# Patient Record
Sex: FEMALE | Race: WHITE
[De-identification: names, ages, dates, MRNs, and addresses within clinical notes are randomized per-mention and may not be internally consistent; named-entity substitution may affect disease eponyms.]

---

## 2017-02-17 ENCOUNTER — APPOINTMENT (OUTPATIENT)
Dept: RHEUMATOLOGY | Facility: CLINIC | Age: 62
End: 2017-02-17

## 2017-02-17 VITALS
HEIGHT: 63 IN | HEART RATE: 61 BPM | DIASTOLIC BLOOD PRESSURE: 98 MMHG | SYSTOLIC BLOOD PRESSURE: 138 MMHG | OXYGEN SATURATION: 98 %

## 2017-02-17 VITALS — WEIGHT: 254 LBS | BODY MASS INDEX: 44.99 KG/M2

## 2017-02-17 DIAGNOSIS — Z86.19 PERSONAL HISTORY OF OTHER INFECTIOUS AND PARASITIC DISEASES: ICD-10-CM

## 2017-02-17 DIAGNOSIS — F17.210 NICOTINE DEPENDENCE, CIGARETTES, UNCOMPLICATED: ICD-10-CM

## 2017-02-17 DIAGNOSIS — I10 ESSENTIAL (PRIMARY) HYPERTENSION: ICD-10-CM

## 2017-02-17 DIAGNOSIS — Z86.59 PERSONAL HISTORY OF OTHER MENTAL AND BEHAVIORAL DISORDERS: ICD-10-CM

## 2017-02-17 DIAGNOSIS — Z86.39 PERSONAL HISTORY OF OTHER ENDOCRINE, NUTRITIONAL AND METABOLIC DISEASE: ICD-10-CM

## 2017-02-17 RX ORDER — ATORVASTATIN CALCIUM 10 MG/1
10 TABLET, FILM COATED ORAL
Refills: 0 | Status: ACTIVE | COMMUNITY

## 2017-02-17 RX ORDER — METHYLPRED ACET/NACL,ISO-OS/PF 80 MG/ML
80 VIAL (ML) INJECTION
Qty: 1 | Refills: 0 | Status: COMPLETED | OUTPATIENT
Start: 2017-02-17

## 2017-02-17 RX ORDER — ETODOLAC 400 MG/1
400 TABLET, FILM COATED ORAL TWICE DAILY
Qty: 60 | Refills: 0 | Status: ACTIVE | COMMUNITY
Start: 2017-02-17 | End: 1900-01-01

## 2017-02-17 RX ORDER — TRAZODONE HYDROCHLORIDE 100 MG/1
100 TABLET ORAL
Refills: 0 | Status: ACTIVE | COMMUNITY

## 2017-02-17 RX ORDER — SERTRALINE HYDROCHLORIDE 50 MG/1
50 TABLET, FILM COATED ORAL
Refills: 0 | Status: ACTIVE | COMMUNITY

## 2017-02-17 RX ORDER — METOPROLOL TARTRATE 75 MG/1
TABLET, FILM COATED ORAL
Refills: 0 | Status: ACTIVE | COMMUNITY

## 2017-02-17 RX ORDER — ENALAPRIL MALEATE 5 MG/1
TABLET ORAL
Refills: 0 | Status: ACTIVE | COMMUNITY

## 2017-02-17 RX ORDER — METFORMIN HYDROCHLORIDE 500 MG/1
500 TABLET, COATED ORAL
Refills: 0 | Status: ACTIVE | COMMUNITY

## 2017-02-17 RX ORDER — GLIMEPIRIDE 1 MG/1
1 TABLET ORAL
Refills: 0 | Status: ACTIVE | COMMUNITY

## 2017-02-17 RX ADMIN — METHYLPREDNISOLONE ACETATE MG/ML: 80 INJECTION, SUSPENSION INTRA-ARTICULAR; INTRALESIONAL; INTRAMUSCULAR; SOFT TISSUE at 00:00

## 2017-03-03 ENCOUNTER — LABORATORY RESULT (OUTPATIENT)
Age: 62
End: 2017-03-03

## 2017-03-04 LAB
ALBUMIN SERPL ELPH-MCNC: 4 G/DL
ALP BLD-CCNC: 63 U/L
ALT SERPL-CCNC: 12 U/L
ANION GAP SERPL CALC-SCNC: 17 MMOL/L
AST SERPL-CCNC: 15 U/L
BILIRUB SERPL-MCNC: 0.2 MG/DL
BUN SERPL-MCNC: 13 MG/DL
CALCIUM SERPL-MCNC: 10.1 MG/DL
CHLORIDE SERPL-SCNC: 98 MMOL/L
CO2 SERPL-SCNC: 25 MMOL/L
CREAT SERPL-MCNC: 0.82 MG/DL
ENA SS-A AB SER IA-ACNC: <0.2 AL
ENA SS-B AB SER IA-ACNC: <0.2 AL
GLUCOSE SERPL-MCNC: 202 MG/DL
HAV IGM SER QL: NONREACTIVE
HBV CORE IGM SER QL: NONREACTIVE
HBV SURFACE AG SER QL: NONREACTIVE
HCV AB SER QL: REACTIVE
HCV S/CO RATIO: 12.72 S/CO
POTASSIUM SERPL-SCNC: 4.4 MMOL/L
PROT SERPL-MCNC: 7.8 G/DL
RHEUMATOID FACT SER QL: 19.4 IU/ML
SODIUM SERPL-SCNC: 140 MMOL/L
URATE SERPL-MCNC: 5.9 MG/DL

## 2017-03-05 LAB
B BURGDOR AB SER-IMP: NEGATIVE
B BURGDOR IGM PATRN SER IB-IMP: NEGATIVE
B BURGDOR18/20KD IGM SER QL IB: NORMAL
B BURGDOR18KD IGG SER QL IB: NORMAL
B BURGDOR23KD IGG SER QL IB: NORMAL
B BURGDOR23KD IGM SER QL IB: NORMAL
B BURGDOR28KD AB SER QL IB: NORMAL
B BURGDOR28KD IGG SER QL IB: NORMAL
B BURGDOR30KD AB SER QL IB: NORMAL
B BURGDOR30KD IGG SER QL IB: NORMAL
B BURGDOR31KD IGG SER QL IB: NORMAL
B BURGDOR31KD IGM SER QL IB: NORMAL
B BURGDOR39KD IGG SER QL IB: NORMAL
B BURGDOR39KD IGM SER QL IB: NORMAL
B BURGDOR41KD IGG SER QL IB: NORMAL
B BURGDOR41KD IGM SER QL IB: NORMAL
B BURGDOR45KD AB SER QL IB: NORMAL
B BURGDOR45KD IGG SER QL IB: NORMAL
B BURGDOR58KD AB SER QL IB: NORMAL
B BURGDOR58KD IGG SER QL IB: NORMAL
B BURGDOR66KD IGG SER QL IB: NORMAL
B BURGDOR66KD IGM SER QL IB: NORMAL
B BURGDOR93KD IGG SER QL IB: NORMAL
B BURGDOR93KD IGM SER QL IB: NORMAL
CCP AB SER IA-ACNC: <8 UNITS
DSDNA AB SER-ACNC: <12 IU/ML
RF+CCP IGG SER-IMP: NEGATIVE

## 2017-03-06 LAB
ANA SER IF-ACNC: NEGATIVE
CRYOGLOB SERPL-MCNC: NEGATIVE

## 2017-03-07 LAB
B19V IGG SER QL IA: 0.8 INDEX
B19V IGG+IGM SER-IMP: NEGATIVE
B19V IGG+IGM SER-IMP: NORMAL
B19V IGM FLD-ACNC: 0.1 INDEX
B19V IGM SER-ACNC: NEGATIVE

## 2017-03-10 ENCOUNTER — APPOINTMENT (OUTPATIENT)
Dept: RHEUMATOLOGY | Facility: CLINIC | Age: 62
End: 2017-03-10

## 2017-03-10 VITALS
HEIGHT: 63 IN | SYSTOLIC BLOOD PRESSURE: 132 MMHG | BODY MASS INDEX: 45 KG/M2 | DIASTOLIC BLOOD PRESSURE: 76 MMHG | WEIGHT: 254 LBS

## 2017-03-10 DIAGNOSIS — M79.641 PAIN IN RIGHT HAND: ICD-10-CM

## 2017-03-10 DIAGNOSIS — R76.8 OTHER SPECIFIED ABNORMAL IMMUNOLOGICAL FINDINGS IN SERUM: ICD-10-CM

## 2017-03-10 DIAGNOSIS — M79.642 PAIN IN RIGHT HAND: ICD-10-CM

## 2017-05-12 ENCOUNTER — APPOINTMENT (OUTPATIENT)
Dept: RHEUMATOLOGY | Facility: CLINIC | Age: 62
End: 2017-05-12

## 2017-05-12 VITALS
SYSTOLIC BLOOD PRESSURE: 140 MMHG | HEIGHT: 63 IN | WEIGHT: 254 LBS | BODY MASS INDEX: 45 KG/M2 | DIASTOLIC BLOOD PRESSURE: 90 MMHG

## 2017-05-12 RX ORDER — METHYLPRED ACET/NACL,ISO-OS/PF 80 MG/ML
80 VIAL (ML) INJECTION
Qty: 1 | Refills: 0 | Status: COMPLETED | OUTPATIENT
Start: 2017-05-12

## 2017-05-12 RX ADMIN — METHYLPREDNISOLONE ACETATE MG/ML: 80 INJECTION, SUSPENSION INTRA-ARTICULAR; INTRALESIONAL; INTRAMUSCULAR; SOFT TISSUE at 00:00

## 2017-05-15 ENCOUNTER — RX RENEWAL (OUTPATIENT)
Age: 62
End: 2017-05-15

## 2017-05-25 RX ORDER — HYALURONATE SODIUM 10 MG/ML
25 SYRINGE (ML) INTRAARTICULAR
Qty: 3 | Refills: 0 | Status: ACTIVE | COMMUNITY
Start: 2017-05-12

## 2017-08-31 ENCOUNTER — RX RENEWAL (OUTPATIENT)
Age: 62
End: 2017-08-31

## 2017-08-31 RX ORDER — IBUPROFEN 600 MG/1
600 TABLET, FILM COATED ORAL TWICE DAILY
Qty: 60 | Refills: 2 | Status: ACTIVE | COMMUNITY
Start: 2017-05-12 | End: 1900-01-01

## 2017-11-03 ENCOUNTER — EMERGENCY (EMERGENCY)
Facility: HOSPITAL | Age: 62
LOS: 0 days | Discharge: HOME | End: 2017-11-03
Attending: EMERGENCY MEDICINE | Admitting: EMERGENCY MEDICINE
Payer: MEDICAID

## 2017-11-03 VITALS
RESPIRATION RATE: 18 BRPM | OXYGEN SATURATION: 100 % | HEIGHT: 63 IN | HEART RATE: 68 BPM | WEIGHT: 240.08 LBS | TEMPERATURE: 98 F | SYSTOLIC BLOOD PRESSURE: 142 MMHG | DIASTOLIC BLOOD PRESSURE: 80 MMHG

## 2017-11-03 DIAGNOSIS — N13.30 UNSPECIFIED HYDRONEPHROSIS: ICD-10-CM

## 2017-11-03 DIAGNOSIS — N39.0 URINARY TRACT INFECTION, SITE NOT SPECIFIED: ICD-10-CM

## 2017-11-03 LAB
ALBUMIN SERPL ELPH-MCNC: 3.9 G/DL — SIGNIFICANT CHANGE UP (ref 3.3–5)
ALP SERPL-CCNC: 69 U/L — SIGNIFICANT CHANGE UP (ref 40–120)
ALT FLD-CCNC: 21 U/L — SIGNIFICANT CHANGE UP (ref 12–78)
ANION GAP SERPL CALC-SCNC: 7 MMOL/L — SIGNIFICANT CHANGE UP (ref 5–17)
APPEARANCE UR: (no result)
APTT BLD: 30.7 SEC — SIGNIFICANT CHANGE UP (ref 27.5–37.4)
AST SERPL-CCNC: 18 U/L — SIGNIFICANT CHANGE UP (ref 15–37)
BACTERIA # UR AUTO: (no result)
BASOPHILS # BLD AUTO: 0.1 K/UL — SIGNIFICANT CHANGE UP (ref 0–0.2)
BASOPHILS NFR BLD AUTO: 0.8 % — SIGNIFICANT CHANGE UP (ref 0–2)
BILIRUB SERPL-MCNC: 0.3 MG/DL — SIGNIFICANT CHANGE UP (ref 0.2–1.2)
BILIRUB UR-MCNC: (no result)
BUN SERPL-MCNC: 16 MG/DL — SIGNIFICANT CHANGE UP (ref 7–23)
CALCIUM SERPL-MCNC: 9.7 MG/DL — SIGNIFICANT CHANGE UP (ref 8.5–10.1)
CHLORIDE SERPL-SCNC: 104 MMOL/L — SIGNIFICANT CHANGE UP (ref 96–108)
CO2 SERPL-SCNC: 29 MMOL/L — SIGNIFICANT CHANGE UP (ref 22–31)
COLOR SPEC: (no result)
CREAT SERPL-MCNC: 1.11 MG/DL — SIGNIFICANT CHANGE UP (ref 0.5–1.3)
DIFF PNL FLD: (no result)
EOSINOPHIL # BLD AUTO: 0.2 K/UL — SIGNIFICANT CHANGE UP (ref 0–0.5)
EOSINOPHIL NFR BLD AUTO: 1.2 % — SIGNIFICANT CHANGE UP (ref 0–6)
EPI CELLS # UR: SIGNIFICANT CHANGE UP
GLUCOSE SERPL-MCNC: 194 MG/DL — HIGH (ref 70–99)
GLUCOSE UR QL: NEGATIVE MG/DL — SIGNIFICANT CHANGE UP
HCT VFR BLD CALC: 42.6 % — SIGNIFICANT CHANGE UP (ref 34.5–45)
HGB BLD-MCNC: 13.8 G/DL — SIGNIFICANT CHANGE UP (ref 11.5–15.5)
INR BLD: 0.93 RATIO — SIGNIFICANT CHANGE UP (ref 0.88–1.16)
KETONES UR-MCNC: NEGATIVE — SIGNIFICANT CHANGE UP
LEUKOCYTE ESTERASE UR-ACNC: NEGATIVE — SIGNIFICANT CHANGE UP
LIDOCAIN IGE QN: 187 U/L — SIGNIFICANT CHANGE UP (ref 73–393)
LYMPHOCYTES # BLD AUTO: 15.9 % — SIGNIFICANT CHANGE UP (ref 13–44)
LYMPHOCYTES # BLD AUTO: 2.4 K/UL — SIGNIFICANT CHANGE UP (ref 1–3.3)
MCHC RBC-ENTMCNC: 28 PG — SIGNIFICANT CHANGE UP (ref 27–34)
MCHC RBC-ENTMCNC: 32.4 GM/DL — SIGNIFICANT CHANGE UP (ref 32–36)
MCV RBC AUTO: 86.6 FL — SIGNIFICANT CHANGE UP (ref 80–100)
MONOCYTES # BLD AUTO: 0.6 K/UL — SIGNIFICANT CHANGE UP (ref 0–0.9)
MONOCYTES NFR BLD AUTO: 3.9 % — SIGNIFICANT CHANGE UP (ref 2–14)
NEUTROPHILS # BLD AUTO: 11.8 K/UL — HIGH (ref 1.8–7.4)
NEUTROPHILS NFR BLD AUTO: 78.2 % — HIGH (ref 43–77)
NITRITE UR-MCNC: POSITIVE
PH UR: 5 — SIGNIFICANT CHANGE UP (ref 5–8)
PLATELET # BLD AUTO: 245 K/UL — SIGNIFICANT CHANGE UP (ref 150–400)
POTASSIUM SERPL-MCNC: 4.5 MMOL/L — SIGNIFICANT CHANGE UP (ref 3.5–5.3)
POTASSIUM SERPL-SCNC: 4.5 MMOL/L — SIGNIFICANT CHANGE UP (ref 3.5–5.3)
PROT SERPL-MCNC: 8.5 GM/DL — HIGH (ref 6–8.3)
PROT UR-MCNC: 100 MG/DL
PROTHROM AB SERPL-ACNC: 10 SEC — SIGNIFICANT CHANGE UP (ref 9.8–12.7)
RBC # BLD: 4.92 M/UL — SIGNIFICANT CHANGE UP (ref 3.8–5.2)
RBC # FLD: 14.4 % — SIGNIFICANT CHANGE UP (ref 10.3–14.5)
RBC CASTS # UR COMP ASSIST: >50 /HPF (ref 0–4)
SODIUM SERPL-SCNC: 140 MMOL/L — SIGNIFICANT CHANGE UP (ref 135–145)
SP GR SPEC: 1.02 — SIGNIFICANT CHANGE UP (ref 1.01–1.02)
UROBILINOGEN FLD QL: 8 MG/DL
WBC # BLD: 15 K/UL — HIGH (ref 3.8–10.5)
WBC # FLD AUTO: 15 K/UL — HIGH (ref 3.8–10.5)
WBC UR QL: SIGNIFICANT CHANGE UP

## 2017-11-03 PROCEDURE — 99285 EMERGENCY DEPT VISIT HI MDM: CPT

## 2017-11-03 PROCEDURE — 74176 CT ABD & PELVIS W/O CONTRAST: CPT | Mod: 26

## 2017-11-03 RX ORDER — CEFTRIAXONE 500 MG/1
1 INJECTION, POWDER, FOR SOLUTION INTRAMUSCULAR; INTRAVENOUS EVERY 24 HOURS
Qty: 0 | Refills: 0 | Status: DISCONTINUED | OUTPATIENT
Start: 2017-11-03 | End: 2017-11-03

## 2017-11-03 RX ORDER — SODIUM CHLORIDE 9 MG/ML
1000 INJECTION INTRAMUSCULAR; INTRAVENOUS; SUBCUTANEOUS ONCE
Qty: 0 | Refills: 0 | Status: COMPLETED | OUTPATIENT
Start: 2017-11-03 | End: 2017-11-03

## 2017-11-03 RX ORDER — HYDROMORPHONE HYDROCHLORIDE 2 MG/ML
1 INJECTION INTRAMUSCULAR; INTRAVENOUS; SUBCUTANEOUS ONCE
Qty: 0 | Refills: 0 | Status: DISCONTINUED | OUTPATIENT
Start: 2017-11-03 | End: 2017-11-03

## 2017-11-03 RX ORDER — ONDANSETRON 8 MG/1
4 TABLET, FILM COATED ORAL ONCE
Qty: 0 | Refills: 0 | Status: COMPLETED | OUTPATIENT
Start: 2017-11-03 | End: 2017-11-03

## 2017-11-03 RX ORDER — CEPHALEXIN 500 MG
1 CAPSULE ORAL
Qty: 21 | Refills: 0
Start: 2017-11-03 | End: 2017-11-10

## 2017-11-03 RX ADMIN — HYDROMORPHONE HYDROCHLORIDE 1 MILLIGRAM(S): 2 INJECTION INTRAMUSCULAR; INTRAVENOUS; SUBCUTANEOUS at 19:10

## 2017-11-03 RX ADMIN — SODIUM CHLORIDE 1000 MILLILITER(S): 9 INJECTION INTRAMUSCULAR; INTRAVENOUS; SUBCUTANEOUS at 18:15

## 2017-11-03 RX ADMIN — CEFTRIAXONE 100 GRAM(S): 500 INJECTION, POWDER, FOR SOLUTION INTRAMUSCULAR; INTRAVENOUS at 18:58

## 2017-11-03 RX ADMIN — ONDANSETRON 4 MILLIGRAM(S): 8 TABLET, FILM COATED ORAL at 18:17

## 2017-11-03 RX ADMIN — HYDROMORPHONE HYDROCHLORIDE 1 MILLIGRAM(S): 2 INJECTION INTRAMUSCULAR; INTRAVENOUS; SUBCUTANEOUS at 18:17

## 2017-11-03 NOTE — ED ADULT NURSE NOTE - OBJECTIVE STATEMENT
Pt alert and oriented x3. Pt presents with Lt sided flank pain and pain upon urination. Pt states she has had a kidney stone in the past.

## 2017-11-03 NOTE — ED STATDOCS - PROGRESS NOTE DETAILS
PAPA Rivera:   Patient has been seen, evaluated and orders have been written by the attending in intake. Patient is stable.  I will follow up the results of orders written and I will continue to evaluate/observe the patient.  Lee Ann Rivera PA-C Reevaluated patient at bedside.  Patient feeling much improved.  Discussed the results of all diagnostic testing in ED and copies of all reports given.   An opportunity to ask questions was given.  Discussed the importance of prompt, close medical follow-up.  Patient will return with any changes, concerns or persistent / worsening symptoms.  Understanding of all instructions verbalized.  Lee Ann Rivera PA-C

## 2017-11-03 NOTE — ED STATDOCS - OBJECTIVE STATEMENT
61 y/o F w/ pmhx of renal calculi about 1 year ago. presents to ED c/o left flank pain with n/v. Pt c/o painful and burning urination. PMD dr. Vega. Pt has not seen a urologist. Also c/o abd pain and constipation. Describes pain as a sharp pain with sudden onset with associated sweats. Also states she has abd pain and is constipated. No other acute complaints.

## 2017-11-03 NOTE — ED STATDOCS - ATTENDING CONTRIBUTION TO CARE
I, Rafa Maria MD,  performed the initial face to face bedside interview with this patient regarding history of present illness, review of symptoms and relevant past medical, social and family history.  I completed an independent physical examination.  I was the initial provider who evaluated this patient. I have signed out the follow up of any pending tests (i.e. labs, radiological studies) to the ACP.  I have communicated the patient’s plan of care and disposition with the ACP.  The history, relevant review of systems, past medical and surgical history, medical decision making, and physical examination was documented by the scribe in my presence and I attest to the accuracy of the documentation.

## 2017-11-03 NOTE — ED STATDOCS - CARE PLAN
Principal Discharge DX:	Renal colic on left side  Secondary Diagnosis:	UTI (urinary tract infection)

## 2017-11-03 NOTE — ED STATDOCS - NS_ ATTENDINGSCRIBEDETAILS _ED_A_ED_FT
I, Rafa Maria MD,  performed the initial face to face bedside interview with this patient regarding history of present illness, review of symptoms and relevant past medical, social and family history.  I completed an independent physical examination.    The history, relevant review of systems, past medical and surgical history, medical decision making, and physical examination was documented by the scribe in my presence and I attest to the accuracy of the documentation.

## 2017-11-19 ENCOUNTER — LABORATORY RESULT (OUTPATIENT)
Age: 62
End: 2017-11-19

## 2017-11-20 ENCOUNTER — APPOINTMENT (OUTPATIENT)
Dept: RHEUMATOLOGY | Facility: CLINIC | Age: 62
End: 2017-11-20
Payer: MEDICAID

## 2017-11-20 VITALS
BODY MASS INDEX: 42.52 KG/M2 | HEIGHT: 63 IN | DIASTOLIC BLOOD PRESSURE: 82 MMHG | WEIGHT: 240 LBS | HEART RATE: 74 BPM | SYSTOLIC BLOOD PRESSURE: 118 MMHG | OXYGEN SATURATION: 97 %

## 2017-11-20 DIAGNOSIS — R70.0 ELEVATED ERYTHROCYTE SEDIMENTATION RATE: ICD-10-CM

## 2017-11-20 DIAGNOSIS — M15.9 POLYOSTEOARTHRITIS, UNSPECIFIED: ICD-10-CM

## 2017-11-20 DIAGNOSIS — Z79.1 LONG TERM (CURRENT) USE OF NON-STEROIDAL ANTI-INFLAMMATORIES (NSAID): ICD-10-CM

## 2017-11-20 PROCEDURE — 20610 DRAIN/INJ JOINT/BURSA W/O US: CPT

## 2017-11-20 PROCEDURE — 99213 OFFICE O/P EST LOW 20 MIN: CPT | Mod: 25

## 2017-11-20 RX ORDER — NITROGLYCERIN 0.4 MG/1
0.4 TABLET SUBLINGUAL
Qty: 100 | Refills: 0 | Status: ACTIVE | COMMUNITY
Start: 2017-06-12

## 2017-11-20 RX ORDER — METHYLPRED ACET/NACL,ISO-OS/PF 80 MG/ML
80 VIAL (ML) INJECTION
Qty: 1 | Refills: 0 | Status: COMPLETED | OUTPATIENT
Start: 2017-11-20

## 2017-11-20 RX ORDER — SERTRALINE HYDROCHLORIDE 100 MG/1
100 TABLET, FILM COATED ORAL
Qty: 60 | Refills: 0 | Status: ACTIVE | COMMUNITY
Start: 2017-06-12

## 2017-11-20 RX ORDER — METFORMIN HYDROCHLORIDE 1000 MG/1
1000 TABLET, COATED ORAL
Qty: 30 | Refills: 0 | Status: ACTIVE | COMMUNITY
Start: 2017-05-04

## 2017-11-20 RX ORDER — METOPROLOL SUCCINATE 100 MG/1
100 TABLET, EXTENDED RELEASE ORAL
Qty: 30 | Refills: 0 | Status: ACTIVE | COMMUNITY
Start: 2017-03-10

## 2017-11-20 RX ORDER — ZOLPIDEM TARTRATE 10 MG/1
10 TABLET ORAL
Qty: 30 | Refills: 0 | Status: ACTIVE | COMMUNITY
Start: 2017-06-12

## 2017-11-20 RX ORDER — OXYCODONE AND ACETAMINOPHEN 5; 325 MG/1; MG/1
5-325 TABLET ORAL
Qty: 4 | Refills: 0 | Status: ACTIVE | COMMUNITY
Start: 2017-11-03

## 2017-11-20 RX ORDER — OMEPRAZOLE 20 MG/1
20 CAPSULE, DELAYED RELEASE ORAL
Qty: 30 | Refills: 0 | Status: ACTIVE | COMMUNITY
Start: 2016-12-27

## 2017-11-20 RX ORDER — ALBUTEROL SULFATE 90 UG/1
108 (90 BASE) AEROSOL, METERED RESPIRATORY (INHALATION)
Qty: 18 | Refills: 0 | Status: ACTIVE | COMMUNITY
Start: 2017-05-30

## 2017-11-20 RX ORDER — GLIMEPIRIDE 2 MG/1
2 TABLET ORAL
Qty: 30 | Refills: 0 | Status: ACTIVE | COMMUNITY
Start: 2017-06-12

## 2017-11-20 RX ADMIN — METHYLPREDNISOLONE ACETATE MG/ML: 80 INJECTION, SUSPENSION INTRA-ARTICULAR; INTRALESIONAL; INTRAMUSCULAR; SOFT TISSUE at 00:00

## 2017-12-14 ENCOUNTER — MOBILE ON CALL (OUTPATIENT)
Age: 62
End: 2017-12-14

## 2017-12-15 ENCOUNTER — OTHER (OUTPATIENT)
Age: 62
End: 2017-12-15

## 2018-01-26 ENCOUNTER — APPOINTMENT (OUTPATIENT)
Dept: RHEUMATOLOGY | Facility: CLINIC | Age: 63
End: 2018-01-26
Payer: MEDICAID

## 2018-01-26 VITALS
BODY MASS INDEX: 44.3 KG/M2 | SYSTOLIC BLOOD PRESSURE: 122 MMHG | WEIGHT: 250 LBS | HEIGHT: 63 IN | OXYGEN SATURATION: 97 % | HEART RATE: 70 BPM | DIASTOLIC BLOOD PRESSURE: 76 MMHG

## 2018-01-26 PROCEDURE — J3490D: CUSTOM

## 2018-01-26 PROCEDURE — 20610 DRAIN/INJ JOINT/BURSA W/O US: CPT | Mod: LT

## 2018-01-26 RX ADMIN — TRIAMCINOLONE ACETONIDE EXTENDED-RELEASE INJECTABLE SUSPENSION 0 MG: KIT INTRA-ARTICULAR at 00:00

## 2018-04-03 NOTE — ED ADULT NURSE NOTE - CAS DISCH ACCOMP BY
1) LEFT FINGER FOREIGN BODY   START ANTIBIOTIC   WARM EPSOM SALT SOAKS TWICE DAILY   TYLENOL OR MOTRIN FOR PAIN     CALL THE ORTHO TOMORROW   
Self/Caregiver

## 2018-04-13 ENCOUNTER — APPOINTMENT (OUTPATIENT)
Dept: RHEUMATOLOGY | Facility: CLINIC | Age: 63
End: 2018-04-13

## 2018-11-13 PROBLEM — N20.0 CALCULUS OF KIDNEY: Chronic | Status: ACTIVE | Noted: 2017-11-04

## 2018-11-16 ENCOUNTER — APPOINTMENT (OUTPATIENT)
Dept: RHEUMATOLOGY | Facility: CLINIC | Age: 63
End: 2018-11-16
Payer: MEDICAID

## 2018-11-16 VITALS
HEIGHT: 63 IN | HEART RATE: 66 BPM | WEIGHT: 240 LBS | BODY MASS INDEX: 42.52 KG/M2 | SYSTOLIC BLOOD PRESSURE: 127 MMHG | DIASTOLIC BLOOD PRESSURE: 84 MMHG | OXYGEN SATURATION: 96 %

## 2018-11-16 PROCEDURE — J3490D: CUSTOM

## 2018-11-16 PROCEDURE — 20610 DRAIN/INJ JOINT/BURSA W/O US: CPT | Mod: LT

## 2018-11-16 RX ADMIN — TRIAMCINOLONE ACETONIDE EXTENDED-RELEASE INJECTABLE SUSPENSION 0 MG: KIT INTRA-ARTICULAR at 00:00

## 2018-12-01 ENCOUNTER — EMERGENCY (EMERGENCY)
Facility: HOSPITAL | Age: 63
LOS: 0 days | Discharge: HOME | End: 2018-12-01
Attending: EMERGENCY MEDICINE | Admitting: EMERGENCY MEDICINE
Payer: MEDICAID

## 2018-12-01 VITALS
DIASTOLIC BLOOD PRESSURE: 88 MMHG | RESPIRATION RATE: 18 BRPM | HEIGHT: 63 IN | SYSTOLIC BLOOD PRESSURE: 164 MMHG | TEMPERATURE: 100 F | HEART RATE: 80 BPM | WEIGHT: 293 LBS | OXYGEN SATURATION: 94 %

## 2018-12-01 DIAGNOSIS — Z91.018 ALLERGY TO OTHER FOODS: ICD-10-CM

## 2018-12-01 DIAGNOSIS — N39.0 URINARY TRACT INFECTION, SITE NOT SPECIFIED: ICD-10-CM

## 2018-12-01 DIAGNOSIS — R10.9 UNSPECIFIED ABDOMINAL PAIN: ICD-10-CM

## 2018-12-01 DIAGNOSIS — Z87.442 PERSONAL HISTORY OF URINARY CALCULI: ICD-10-CM

## 2018-12-01 LAB
ABO RH CONFIRMATION: SIGNIFICANT CHANGE UP
ALBUMIN SERPL ELPH-MCNC: 3.3 G/DL — SIGNIFICANT CHANGE UP (ref 3.3–5)
ALP SERPL-CCNC: 69 U/L — SIGNIFICANT CHANGE UP (ref 40–120)
ALT FLD-CCNC: 17 U/L — SIGNIFICANT CHANGE UP (ref 12–78)
ANION GAP SERPL CALC-SCNC: 7 MMOL/L — SIGNIFICANT CHANGE UP (ref 5–17)
APPEARANCE UR: CLEAR — SIGNIFICANT CHANGE UP
APTT BLD: 31.8 SEC — SIGNIFICANT CHANGE UP (ref 27.5–36.3)
AST SERPL-CCNC: 13 U/L — LOW (ref 15–37)
BACTERIA # UR AUTO: ABNORMAL
BASOPHILS # BLD AUTO: 0.04 K/UL — SIGNIFICANT CHANGE UP (ref 0–0.2)
BASOPHILS NFR BLD AUTO: 0.3 % — SIGNIFICANT CHANGE UP (ref 0–2)
BILIRUB SERPL-MCNC: 0.2 MG/DL — SIGNIFICANT CHANGE UP (ref 0.2–1.2)
BILIRUB UR-MCNC: NEGATIVE — SIGNIFICANT CHANGE UP
BLD GP AB SCN SERPL QL: SIGNIFICANT CHANGE UP
BUN SERPL-MCNC: 19 MG/DL — SIGNIFICANT CHANGE UP (ref 7–23)
CALCIUM SERPL-MCNC: 9.5 MG/DL — SIGNIFICANT CHANGE UP (ref 8.5–10.1)
CHLORIDE SERPL-SCNC: 102 MMOL/L — SIGNIFICANT CHANGE UP (ref 96–108)
CO2 SERPL-SCNC: 27 MMOL/L — SIGNIFICANT CHANGE UP (ref 22–31)
COLOR SPEC: YELLOW — SIGNIFICANT CHANGE UP
COMMENT - URINE: SIGNIFICANT CHANGE UP
CREAT SERPL-MCNC: 1.02 MG/DL — SIGNIFICANT CHANGE UP (ref 0.5–1.3)
DIFF PNL FLD: ABNORMAL
EOSINOPHIL # BLD AUTO: 0.12 K/UL — SIGNIFICANT CHANGE UP (ref 0–0.5)
EOSINOPHIL NFR BLD AUTO: 0.8 % — SIGNIFICANT CHANGE UP (ref 0–6)
EPI CELLS # UR: ABNORMAL
GLUCOSE SERPL-MCNC: 199 MG/DL — HIGH (ref 70–99)
GLUCOSE UR QL: NEGATIVE MG/DL — SIGNIFICANT CHANGE UP
HCT VFR BLD CALC: 40.9 % — SIGNIFICANT CHANGE UP (ref 34.5–45)
HGB BLD-MCNC: 13.2 G/DL — SIGNIFICANT CHANGE UP (ref 11.5–15.5)
IMM GRANULOCYTES NFR BLD AUTO: 0.3 % — SIGNIFICANT CHANGE UP (ref 0–1.5)
INR BLD: 1 RATIO — SIGNIFICANT CHANGE UP (ref 0.88–1.16)
KETONES UR-MCNC: NEGATIVE — SIGNIFICANT CHANGE UP
LEUKOCYTE ESTERASE UR-ACNC: ABNORMAL
LIDOCAIN IGE QN: 181 U/L — SIGNIFICANT CHANGE UP (ref 73–393)
LYMPHOCYTES # BLD AUTO: 19.7 % — SIGNIFICANT CHANGE UP (ref 13–44)
LYMPHOCYTES # BLD AUTO: 2.78 K/UL — SIGNIFICANT CHANGE UP (ref 1–3.3)
MAGNESIUM SERPL-MCNC: 1.7 MG/DL — SIGNIFICANT CHANGE UP (ref 1.6–2.6)
MCHC RBC-ENTMCNC: 28.1 PG — SIGNIFICANT CHANGE UP (ref 27–34)
MCHC RBC-ENTMCNC: 32.3 GM/DL — SIGNIFICANT CHANGE UP (ref 32–36)
MCV RBC AUTO: 87.2 FL — SIGNIFICANT CHANGE UP (ref 80–100)
MONOCYTES # BLD AUTO: 0.95 K/UL — HIGH (ref 0–0.9)
MONOCYTES NFR BLD AUTO: 6.7 % — SIGNIFICANT CHANGE UP (ref 2–14)
NEUTROPHILS # BLD AUTO: 10.21 K/UL — HIGH (ref 1.8–7.4)
NEUTROPHILS NFR BLD AUTO: 72.2 % — SIGNIFICANT CHANGE UP (ref 43–77)
NITRITE UR-MCNC: NEGATIVE — SIGNIFICANT CHANGE UP
NRBC # BLD: 0 /100 WBCS — SIGNIFICANT CHANGE UP (ref 0–0)
PH UR: 5 — SIGNIFICANT CHANGE UP (ref 5–8)
PLATELET # BLD AUTO: 234 K/UL — SIGNIFICANT CHANGE UP (ref 150–400)
POTASSIUM SERPL-MCNC: 4.5 MMOL/L — SIGNIFICANT CHANGE UP (ref 3.5–5.3)
POTASSIUM SERPL-SCNC: 4.5 MMOL/L — SIGNIFICANT CHANGE UP (ref 3.5–5.3)
PROT SERPL-MCNC: 8 GM/DL — SIGNIFICANT CHANGE UP (ref 6–8.3)
PROT UR-MCNC: 100 MG/DL
PROTHROM AB SERPL-ACNC: 11.1 SEC — SIGNIFICANT CHANGE UP (ref 10–12.9)
RBC # BLD: 4.69 M/UL — SIGNIFICANT CHANGE UP (ref 3.8–5.2)
RBC # FLD: 14.6 % — HIGH (ref 10.3–14.5)
RBC CASTS # UR COMP ASSIST: ABNORMAL /HPF (ref 0–4)
SODIUM SERPL-SCNC: 136 MMOL/L — SIGNIFICANT CHANGE UP (ref 135–145)
SP GR SPEC: 1.02 — SIGNIFICANT CHANGE UP (ref 1.01–1.02)
TROPONIN I SERPL-MCNC: <0.015 NG/ML — SIGNIFICANT CHANGE UP (ref 0.01–0.04)
TYPE + AB SCN PNL BLD: SIGNIFICANT CHANGE UP
UROBILINOGEN FLD QL: NEGATIVE MG/DL — SIGNIFICANT CHANGE UP
WBC # BLD: 14.14 K/UL — HIGH (ref 3.8–10.5)
WBC # FLD AUTO: 14.14 K/UL — HIGH (ref 3.8–10.5)
WBC UR QL: ABNORMAL

## 2018-12-01 PROCEDURE — 99284 EMERGENCY DEPT VISIT MOD MDM: CPT

## 2018-12-01 PROCEDURE — 93010 ELECTROCARDIOGRAM REPORT: CPT

## 2018-12-01 PROCEDURE — 71045 X-RAY EXAM CHEST 1 VIEW: CPT | Mod: 26

## 2018-12-01 PROCEDURE — 74177 CT ABD & PELVIS W/CONTRAST: CPT | Mod: 26

## 2018-12-01 RX ORDER — CEPHALEXIN 500 MG
500 CAPSULE ORAL ONCE
Qty: 0 | Refills: 0 | Status: DISCONTINUED | OUTPATIENT
Start: 2018-12-01 | End: 2018-12-01

## 2018-12-01 RX ORDER — CEPHALEXIN 500 MG
1 CAPSULE ORAL
Qty: 20 | Refills: 0
Start: 2018-12-01 | End: 2018-12-05

## 2018-12-01 NOTE — ED PROVIDER NOTE - CONSTITUTIONAL, MLM
normal... increased bmi, awake, alert, oriented to person, place, time/situation and in no apparent distress.

## 2018-12-01 NOTE — ED PROVIDER NOTE - OBJECTIVE STATEMENT
pt presents c/o genralzied achy non radiating abd pain x 1 days and high blood sugar. on metformin. denies fever. denies HA or neck pain. no chest pain or sob. + abd pain. no n/v/d. no urinary f/u/d. no back pain. no motor or sensory deficits. denies illicit drug use. no recent travel. no rash. no other acute issues symptoms or concerns . pt is passing gas notes decreased bowel movement

## 2018-12-01 NOTE — ED PROVIDER NOTE - PROGRESS NOTE DETAILS
painc ontrol ct rule out acute intrcranial pathology feeling better no urianry symtopms will trerat only if cx postive return to ed for intractable abd pain fever any overall worsening feeling better  return to ed for intractable abd pain fever any overall worsening

## 2018-12-01 NOTE — ED ADULT TRIAGE NOTE - CHIEF COMPLAINT QUOTE
Pt presents to ED complaining of elevated blood glucose level at home in the 300's. In triage BG is 246. Pt noticeably shaky and dizzy upon assessment. Alert and oriented, PO metformin taken as prescribed PTA.

## 2018-12-01 NOTE — ED ADULT NURSE NOTE - NSIMPLEMENTINTERV_GEN_ALL_ED
Implemented All Fall Risk Interventions:  [place] to call system. Call bell, personal items and telephone within reach. Instruct patient to call for assistance. Room bathroom lighting operational. Non-slip footwear when patient is off stretcher. Physically safe environment: no spills, clutter or unnecessary equipment. Stretcher in lowest position, wheels locked, appropriate side rails in place. Provide visual cue, wrist band, yellow gown, etc. Monitor gait and stability. Monitor for mental status changes and reorient to person, place, and time. Review medications for side effects contributing to fall risk. Reinforce activity limits and safety measures with patient and family.

## 2019-03-01 ENCOUNTER — OUTPATIENT (OUTPATIENT)
Dept: OUTPATIENT SERVICES | Facility: HOSPITAL | Age: 64
LOS: 1 days | End: 2019-03-01
Payer: MEDICARE

## 2019-03-01 PROCEDURE — G9001: CPT

## 2019-03-06 ENCOUNTER — APPOINTMENT (OUTPATIENT)
Dept: RHEUMATOLOGY | Facility: CLINIC | Age: 64
End: 2019-03-06
Payer: MEDICARE

## 2019-03-06 VITALS
OXYGEN SATURATION: 98 % | WEIGHT: 240 LBS | DIASTOLIC BLOOD PRESSURE: 78 MMHG | SYSTOLIC BLOOD PRESSURE: 138 MMHG | HEIGHT: 63 IN | BODY MASS INDEX: 42.52 KG/M2 | HEART RATE: 68 BPM

## 2019-03-06 PROCEDURE — J3490F: CUSTOM

## 2019-03-06 PROCEDURE — 99213 OFFICE O/P EST LOW 20 MIN: CPT | Mod: 25

## 2019-03-06 PROCEDURE — 20610 DRAIN/INJ JOINT/BURSA W/O US: CPT | Mod: LT

## 2019-03-06 RX ORDER — HYALURONATE SODIUM, STABILIZED 60 MG/3 ML
60 SYRINGE (ML) INTRAARTICULAR
Refills: 0 | Status: COMPLETED | OUTPATIENT
Start: 2019-03-06

## 2019-03-06 RX ADMIN — Medication 0 MG/3ML: at 00:00

## 2019-03-06 NOTE — HISTORY OF PRESENT ILLNESS
[FreeTextEntry1] : 61 year old female with a history of diabetes, HTN, hep C s/p harvoni with remission,  and depression with OA affecting her hands and knee. \par her knee pain is now returning, she wants to know what she should do next. She is here for gel injection. She denies any trauma or recent injuries. She rates her knee pain at a 7/10 \par Otherwise her other joints feel okay. Her sugars have been high and she states that she has been told that she has proteinuria. She has a good appetite and denies weight loss although she is trying and thinks his lost a few pounds since her last visit.

## 2019-03-06 NOTE — ASSESSMENT
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 she tolerated the procedure well. \par to ice the knee today. \par \par f/u 6 months. She is aware to call if her symptoms worsen.

## 2019-03-06 NOTE — REVIEW OF SYSTEMS
[Fever] : no fever [Chills] : no chills [Feeling Poorly] : feeling poorly [Feeling Tired] : feeling tired [Eye Pain] : no eye pain [Dry Eyes] : no dryness of the eyes [Loss Of Hearing] : no hearing loss [Chest Pain] : no chest pain [Palpitations] : no palpitations [Leg Claudication] : no intermittent leg claudication [Lower Ext Edema] : no lower extremity edema [Shortness Of Breath] : no shortness of breath [Cough] : no cough [SOB on Exertion] : no shortness of breath during exertion [Abdominal Pain] : no abdominal pain [Dysuria] : no dysuria [Arthralgias] : arthralgias [Joint Swelling] : joint swelling [Joint Stiffness] : joint stiffness [Skin Lesions] : no skin lesions [Dizziness] : no dizziness [Fainting] : no fainting [Anxiety] : no anxiety [Depression] : no depression [Muscle Weakness] : no muscle weakness [Easy Bruising] : no tendency for easy bruising

## 2019-03-06 NOTE — PHYSICAL EXAM
[General Appearance - Alert] : alert [General Appearance - Well Nourished] : well nourished [General Appearance - Well Developed] : well developed [Sclera] : the sclera and conjunctiva were normal [Outer Ear] : the ears and nose were normal in appearance [Oropharynx] : the oropharynx was normal [Neck Appearance] : the appearance of the neck was normal [Thyroid Diffuse Enlargement] : the thyroid was not enlarged [Respiration, Rhythm And Depth] : normal respiratory rhythm and effort [Auscultation Breath Sounds / Voice Sounds] : lungs were clear to auscultation bilaterally [Heart Sounds] : normal S1 and S2 [Murmurs] : no murmurs [Full Pulse] : the pedal pulses are present [Edema] : there was no peripheral edema [Abdomen Tenderness] : non-tender [Cervical Lymph Nodes Enlarged Anterior Bilaterally] : anterior cervical [Supraclavicular Lymph Nodes Enlarged Bilaterally] : supraclavicular [No CVA Tenderness] : no ~M costovertebral angle tenderness [No Spinal Tenderness] : no spinal tenderness [Abnormal Walk] : normal gait [Motor Tone] : muscle strength and tone were normal [FreeTextEntry1] : hands with fullness over MCP and PIP, left knee with effusion and POM [Skin Turgor] : normal skin turgor [] : no rash [Deep Tendon Reflexes (DTR)] : deep tendon reflexes were 2+ and symmetric [Sensation] : the sensory exam was normal to light touch and pinprick [Motor Exam] : the motor exam was normal [Oriented To Time, Place, And Person] : oriented to person, place, and time [Impaired Insight] : insight and judgment were intact [Affect] : the affect was normal

## 2019-03-06 NOTE — PROCEDURE
[Today's Date:] : Date: [unfilled] [Patient] : the patient [Risks] : risks [Benefits] : benefits [Alternatives] : alternatives [Therapeutic] : therapeutic [#1 Site: ______] : #1 site identified in the [unfilled] [Ethyl Chloride] : ethyl chloride [___ ml Inj] : [unfilled] ~Uml [Betadine] : betadine solution [Alcohol] : alcohol [Chlorhexidine] : chlorhexidine [25 gauge 1.5 inch] : A 25 gauge 1.5 inch needle was used [___ml of fluid] : [unfilled] ml of fluid was aspirated [Depomedrol ___ mg] : Depomedrol [unfilled] mg [Tolerated Well] : the patient tolerated the procedure well [No Complications] : there were no complications [Instructions Given] : handouts/patient instructions were given to patient [Patient Instructed to Call] : patient was instructed to call if redness at site, a decrease in range of motion or an increase in pain is noted after procedure. [de-identified] : sally

## 2019-03-08 DIAGNOSIS — Z71.89 OTHER SPECIFIED COUNSELING: ICD-10-CM

## 2019-09-11 ENCOUNTER — APPOINTMENT (OUTPATIENT)
Dept: RHEUMATOLOGY | Facility: CLINIC | Age: 64
End: 2019-09-11
Payer: MEDICARE

## 2019-09-11 VITALS
HEIGHT: 63 IN | SYSTOLIC BLOOD PRESSURE: 117 MMHG | BODY MASS INDEX: 42.88 KG/M2 | DIASTOLIC BLOOD PRESSURE: 74 MMHG | HEART RATE: 68 BPM | OXYGEN SATURATION: 98 % | WEIGHT: 242 LBS

## 2019-09-11 PROCEDURE — 20610 DRAIN/INJ JOINT/BURSA W/O US: CPT | Mod: LT

## 2019-09-11 RX ORDER — METHYLPRED ACET/NACL,ISO-OS/PF 80 MG/ML
80 VIAL (ML) INJECTION
Qty: 1 | Refills: 0 | Status: COMPLETED | OUTPATIENT
Start: 2019-09-11

## 2019-09-11 RX ADMIN — METHYLPREDNISOLONE ACETATE MG/ML: 80 INJECTION, SUSPENSION INTRA-ARTICULAR; INTRALESIONAL; INTRAMUSCULAR; SOFT TISSUE at 00:00

## 2019-09-11 NOTE — ASSESSMENT
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 she tolerated the procedure well. \par to ice the knee today. \par \par f/u 3 months. She is aware to call if her symptoms worsen.

## 2019-09-11 NOTE — PROCEDURE
[Today's Date:] : Date: [unfilled] [Patient] : the patient [Risks] : risks [Benefits] : benefits [Alternatives] : alternatives [Therapeutic] : therapeutic [#1 Site: ______] : #1 site identified in the [unfilled] [Ethyl Chloride] : ethyl chloride [___ ml Inj] : [unfilled] ~Uml [Betadine] : betadine solution [Alcohol] : alcohol [Chlorhexidine] : chlorhexidine [___ml of fluid] : [unfilled] ml of fluid was aspirated [25 gauge 1.5 inch] : A 25 gauge 1.5 inch needle was used [Depomedrol ___ mg] : Depomedrol [unfilled] mg [Tolerated Well] : the patient tolerated the procedure well [No Complications] : there were no complications [Instructions Given] : handouts/patient instructions were given to patient [Patient Instructed to Call] : patient was instructed to call if redness at site, a decrease in range of motion or an increase in pain is noted after procedure.

## 2019-12-13 ENCOUNTER — APPOINTMENT (OUTPATIENT)
Dept: RHEUMATOLOGY | Facility: CLINIC | Age: 64
End: 2019-12-13
Payer: MEDICARE

## 2019-12-13 VITALS
SYSTOLIC BLOOD PRESSURE: 152 MMHG | HEART RATE: 61 BPM | BODY MASS INDEX: 42.88 KG/M2 | WEIGHT: 242 LBS | DIASTOLIC BLOOD PRESSURE: 71 MMHG | OXYGEN SATURATION: 97 % | HEIGHT: 63 IN

## 2019-12-13 PROCEDURE — 99213 OFFICE O/P EST LOW 20 MIN: CPT | Mod: 25

## 2019-12-13 PROCEDURE — 20610 DRAIN/INJ JOINT/BURSA W/O US: CPT | Mod: LT

## 2019-12-13 RX ORDER — METHYLPRED ACET/NACL,ISO-OS/PF 80 MG/ML
80 VIAL (ML) INJECTION
Qty: 1 | Refills: 0 | Status: COMPLETED | OUTPATIENT
Start: 2019-12-13

## 2019-12-13 RX ORDER — AZITHROMYCIN 250 MG/1
250 TABLET, FILM COATED ORAL
Qty: 6 | Refills: 0 | Status: DISCONTINUED | COMMUNITY
Start: 2017-10-16 | End: 2019-12-13

## 2019-12-13 RX ORDER — ATORVASTATIN CALCIUM 40 MG/1
40 TABLET, FILM COATED ORAL
Qty: 30 | Refills: 0 | Status: DISCONTINUED | COMMUNITY
Start: 2017-05-04 | End: 2019-12-13

## 2019-12-13 RX ORDER — CEPHALEXIN 500 MG/1
500 CAPSULE ORAL
Qty: 21 | Refills: 0 | Status: DISCONTINUED | COMMUNITY
Start: 2017-11-03 | End: 2019-12-13

## 2019-12-13 RX ADMIN — METHYLPREDNISOLONE ACETATE MG/ML: 80 INJECTION, SUSPENSION INTRA-ARTICULAR; INTRALESIONAL; INTRAMUSCULAR; SOFT TISSUE at 00:00

## 2019-12-13 NOTE — REVIEW OF SYSTEMS
[Fever] : no fever [Chills] : no chills [Feeling Poorly] : feeling poorly [Feeling Tired] : feeling tired [Eye Pain] : no eye pain [Dry Eyes] : no dryness of the eyes [Loss Of Hearing] : no hearing loss [Palpitations] : no palpitations [Leg Claudication] : no intermittent leg claudication [Chest Pain] : no chest pain [Shortness Of Breath] : no shortness of breath [Lower Ext Edema] : no lower extremity edema [Abdominal Pain] : no abdominal pain [SOB on Exertion] : no shortness of breath during exertion [Cough] : no cough [Dysuria] : no dysuria [Arthralgias] : arthralgias [Joint Stiffness] : joint stiffness [Joint Swelling] : joint swelling [Fainting] : no fainting [Dizziness] : no dizziness [Skin Lesions] : no skin lesions [Anxiety] : no anxiety [Depression] : no depression [Easy Bruising] : no tendency for easy bruising [Muscle Weakness] : no muscle weakness

## 2019-12-13 NOTE — HISTORY OF PRESENT ILLNESS
[FreeTextEntry1] : 64 year old female with a history of diabetes, HTN, hep C s/p harvoni with remission,  and depression with OA affecting her hands and knee. \par She is here for steroid injection today\par  She denies any trauma or recent injuries. She rates her knee pain at a 7/10 \par Otherwise her other joints feel okay. She has a good appetite and denies weight loss

## 2019-12-13 NOTE — PHYSICAL EXAM
[General Appearance - Well Nourished] : well nourished [General Appearance - Alert] : alert [Sclera] : the sclera and conjunctiva were normal [General Appearance - Well Developed] : well developed [Oropharynx] : the oropharynx was normal [Outer Ear] : the ears and nose were normal in appearance [Thyroid Diffuse Enlargement] : the thyroid was not enlarged [Respiration, Rhythm And Depth] : normal respiratory rhythm and effort [Neck Appearance] : the appearance of the neck was normal [Murmurs] : no murmurs [Auscultation Breath Sounds / Voice Sounds] : lungs were clear to auscultation bilaterally [Heart Sounds] : normal S1 and S2 [Full Pulse] : the pedal pulses are present [Edema] : there was no peripheral edema [Cervical Lymph Nodes Enlarged Anterior Bilaterally] : anterior cervical [Supraclavicular Lymph Nodes Enlarged Bilaterally] : supraclavicular [Abdomen Tenderness] : non-tender [Abnormal Walk] : normal gait [No Spinal Tenderness] : no spinal tenderness [No CVA Tenderness] : no ~M costovertebral angle tenderness [Motor Tone] : muscle strength and tone were normal [Skin Turgor] : normal skin turgor [FreeTextEntry1] : hands with fullness over MCP and PIP, left knee with POM [Sensation] : the sensory exam was normal to light touch and pinprick [] : no rash [Deep Tendon Reflexes (DTR)] : deep tendon reflexes were 2+ and symmetric [Oriented To Time, Place, And Person] : oriented to person, place, and time [Motor Exam] : the motor exam was normal [Impaired Insight] : insight and judgment were intact [Affect] : the affect was normal

## 2019-12-13 NOTE — PROCEDURE
[Today's Date:] : Date: [unfilled] [Patient] : the patient [Risks] : risks [Benefits] : benefits [Alternatives] : alternatives [Therapeutic] : therapeutic [Ethyl Chloride] : ethyl chloride [#1 Site: ______] : #1 site identified in the [unfilled] [Betadine] : betadine solution [___ ml Inj] : [unfilled] ~Uml [Alcohol] : alcohol [Chlorhexidine] : chlorhexidine [25 gauge 1.5 inch] : A 25 gauge 1.5 inch needle was used [___ml of fluid] : [unfilled] ml of fluid was aspirated [Depomedrol ___ mg] : Depomedrol [unfilled] mg [No Complications] : there were no complications [Instructions Given] : handouts/patient instructions were given to patient [Tolerated Well] : the patient tolerated the procedure well [Patient Instructed to Call] : patient was instructed to call if redness at site, a decrease in range of motion or an increase in pain is noted after procedure.

## 2019-12-13 NOTE — ASSESSMENT
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 she tolerated the procedure well. \par to ice the knee today. \par \par f/u 6 months. She is aware to call if her symptoms worsen.

## 2020-03-13 ENCOUNTER — APPOINTMENT (OUTPATIENT)
Dept: RHEUMATOLOGY | Facility: CLINIC | Age: 65
End: 2020-03-13

## 2022-10-03 ENCOUNTER — OUTPATIENT (OUTPATIENT)
Dept: OUTPATIENT SERVICES | Facility: HOSPITAL | Age: 67
LOS: 1 days | End: 2022-10-03
Payer: MEDICARE

## 2022-10-03 VITALS
HEART RATE: 63 BPM | OXYGEN SATURATION: 100 % | WEIGHT: 225.97 LBS | DIASTOLIC BLOOD PRESSURE: 57 MMHG | TEMPERATURE: 98 F | SYSTOLIC BLOOD PRESSURE: 124 MMHG | RESPIRATION RATE: 16 BRPM | HEIGHT: 62 IN

## 2022-10-03 DIAGNOSIS — M53.2X6 SPINAL INSTABILITIES, LUMBAR REGION: ICD-10-CM

## 2022-10-03 DIAGNOSIS — E11.9 TYPE 2 DIABETES MELLITUS WITHOUT COMPLICATIONS: ICD-10-CM

## 2022-10-03 DIAGNOSIS — M54.50 LOW BACK PAIN, UNSPECIFIED: ICD-10-CM

## 2022-10-03 DIAGNOSIS — Z91.89 OTHER SPECIFIED PERSONAL RISK FACTORS, NOT ELSEWHERE CLASSIFIED: ICD-10-CM

## 2022-10-03 DIAGNOSIS — I10 ESSENTIAL (PRIMARY) HYPERTENSION: ICD-10-CM

## 2022-10-03 DIAGNOSIS — Z98.891 HISTORY OF UTERINE SCAR FROM PREVIOUS SURGERY: Chronic | ICD-10-CM

## 2022-10-03 DIAGNOSIS — Z01.818 ENCOUNTER FOR OTHER PREPROCEDURAL EXAMINATION: ICD-10-CM

## 2022-10-03 LAB
A1C WITH ESTIMATED AVERAGE GLUCOSE RESULT: 7.5 % — HIGH (ref 4–5.6)
ALBUMIN SERPL ELPH-MCNC: 4.2 G/DL — SIGNIFICANT CHANGE UP (ref 3.3–5)
ANION GAP SERPL CALC-SCNC: 8 MMOL/L — SIGNIFICANT CHANGE UP (ref 5–17)
APPEARANCE UR: CLEAR — SIGNIFICANT CHANGE UP
APTT BLD: 33.7 SEC — SIGNIFICANT CHANGE UP (ref 27.5–35.5)
BASOPHILS # BLD AUTO: 0.06 K/UL — SIGNIFICANT CHANGE UP (ref 0–0.2)
BASOPHILS NFR BLD AUTO: 0.5 % — SIGNIFICANT CHANGE UP (ref 0–2)
BILIRUB UR-MCNC: NEGATIVE — SIGNIFICANT CHANGE UP
BUN SERPL-MCNC: 30 MG/DL — HIGH (ref 7–23)
CALCIUM SERPL-MCNC: 10.2 MG/DL — HIGH (ref 8.5–10.1)
CHLORIDE SERPL-SCNC: 104 MMOL/L — SIGNIFICANT CHANGE UP (ref 96–108)
CO2 SERPL-SCNC: 27 MMOL/L — SIGNIFICANT CHANGE UP (ref 22–31)
COLOR SPEC: YELLOW — SIGNIFICANT CHANGE UP
CREAT SERPL-MCNC: 1.32 MG/DL — HIGH (ref 0.5–1.3)
DIFF PNL FLD: ABNORMAL
EGFR: 44 ML/MIN/1.73M2 — LOW
EOSINOPHIL # BLD AUTO: 0.26 K/UL — SIGNIFICANT CHANGE UP (ref 0–0.5)
EOSINOPHIL NFR BLD AUTO: 2.2 % — SIGNIFICANT CHANGE UP (ref 0–6)
ESTIMATED AVERAGE GLUCOSE: 169 MG/DL — HIGH (ref 68–114)
GLUCOSE SERPL-MCNC: 151 MG/DL — HIGH (ref 70–99)
GLUCOSE UR QL: 1000 MG/DL
HCT VFR BLD CALC: 46.2 % — HIGH (ref 34.5–45)
HGB BLD-MCNC: 14.6 G/DL — SIGNIFICANT CHANGE UP (ref 11.5–15.5)
IMM GRANULOCYTES NFR BLD AUTO: 0.3 % — SIGNIFICANT CHANGE UP (ref 0–0.9)
INR BLD: 0.97 RATIO — SIGNIFICANT CHANGE UP (ref 0.88–1.16)
KETONES UR-MCNC: NEGATIVE — SIGNIFICANT CHANGE UP
LEUKOCYTE ESTERASE UR-ACNC: ABNORMAL
LYMPHOCYTES # BLD AUTO: 2.93 K/UL — SIGNIFICANT CHANGE UP (ref 1–3.3)
LYMPHOCYTES # BLD AUTO: 25.3 % — SIGNIFICANT CHANGE UP (ref 13–44)
MCHC RBC-ENTMCNC: 28.2 PG — SIGNIFICANT CHANGE UP (ref 27–34)
MCHC RBC-ENTMCNC: 31.6 GM/DL — LOW (ref 32–36)
MCV RBC AUTO: 89.4 FL — SIGNIFICANT CHANGE UP (ref 80–100)
MONOCYTES # BLD AUTO: 0.8 K/UL — SIGNIFICANT CHANGE UP (ref 0–0.9)
MONOCYTES NFR BLD AUTO: 6.9 % — SIGNIFICANT CHANGE UP (ref 2–14)
MRSA PCR RESULT.: SIGNIFICANT CHANGE UP
NEUTROPHILS # BLD AUTO: 7.5 K/UL — HIGH (ref 1.8–7.4)
NEUTROPHILS NFR BLD AUTO: 64.8 % — SIGNIFICANT CHANGE UP (ref 43–77)
NITRITE UR-MCNC: NEGATIVE — SIGNIFICANT CHANGE UP
PH UR: 5 — SIGNIFICANT CHANGE UP (ref 5–8)
PLATELET # BLD AUTO: 321 K/UL — SIGNIFICANT CHANGE UP (ref 150–400)
POTASSIUM SERPL-MCNC: 4.1 MMOL/L — SIGNIFICANT CHANGE UP (ref 3.5–5.3)
POTASSIUM SERPL-SCNC: 4.1 MMOL/L — SIGNIFICANT CHANGE UP (ref 3.5–5.3)
PROT UR-MCNC: SIGNIFICANT CHANGE UP MG/DL
PROTHROM AB SERPL-ACNC: 11.3 SEC — SIGNIFICANT CHANGE UP (ref 10.5–13.4)
RBC # BLD: 5.17 M/UL — SIGNIFICANT CHANGE UP (ref 3.8–5.2)
RBC # FLD: 15.3 % — HIGH (ref 10.3–14.5)
S AUREUS DNA NOSE QL NAA+PROBE: SIGNIFICANT CHANGE UP
SODIUM SERPL-SCNC: 139 MMOL/L — SIGNIFICANT CHANGE UP (ref 135–145)
SP GR SPEC: 1.01 — SIGNIFICANT CHANGE UP (ref 1.01–1.02)
UROBILINOGEN FLD QL: NEGATIVE — SIGNIFICANT CHANGE UP
WBC # BLD: 11.59 K/UL — HIGH (ref 3.8–10.5)
WBC # FLD AUTO: 11.59 K/UL — HIGH (ref 3.8–10.5)

## 2022-10-03 PROCEDURE — 85730 THROMBOPLASTIN TIME PARTIAL: CPT

## 2022-10-03 PROCEDURE — 93005 ELECTROCARDIOGRAM TRACING: CPT

## 2022-10-03 PROCEDURE — 81001 URINALYSIS AUTO W/SCOPE: CPT

## 2022-10-03 PROCEDURE — 83036 HEMOGLOBIN GLYCOSYLATED A1C: CPT

## 2022-10-03 PROCEDURE — 87640 STAPH A DNA AMP PROBE: CPT

## 2022-10-03 PROCEDURE — 86850 RBC ANTIBODY SCREEN: CPT

## 2022-10-03 PROCEDURE — 87641 MR-STAPH DNA AMP PROBE: CPT

## 2022-10-03 PROCEDURE — 36415 COLL VENOUS BLD VENIPUNCTURE: CPT

## 2022-10-03 PROCEDURE — 82040 ASSAY OF SERUM ALBUMIN: CPT

## 2022-10-03 PROCEDURE — 71046 X-RAY EXAM CHEST 2 VIEWS: CPT

## 2022-10-03 PROCEDURE — 80048 BASIC METABOLIC PNL TOTAL CA: CPT

## 2022-10-03 PROCEDURE — 85610 PROTHROMBIN TIME: CPT

## 2022-10-03 PROCEDURE — 93010 ELECTROCARDIOGRAM REPORT: CPT

## 2022-10-03 PROCEDURE — 86901 BLOOD TYPING SEROLOGIC RH(D): CPT

## 2022-10-03 PROCEDURE — 85025 COMPLETE CBC W/AUTO DIFF WBC: CPT

## 2022-10-03 PROCEDURE — 86900 BLOOD TYPING SEROLOGIC ABO: CPT

## 2022-10-03 PROCEDURE — 99214 OFFICE O/P EST MOD 30 MIN: CPT | Mod: 25

## 2022-10-03 PROCEDURE — 71046 X-RAY EXAM CHEST 2 VIEWS: CPT | Mod: 26

## 2022-10-03 NOTE — H&P PST ADULT - PROBLEM SELECTOR PLAN 2
Cardiac consult scheduled pending reports  On  the DOS  with sip of water take cardiac meds - metoprolol   Patient verbalized understanding. Cardiac consult scheduled pending reports  On  the DOS  with sip of water take cardiac meds - metoprolol, enalapril   Patient verbalized understanding.

## 2022-10-03 NOTE — H&P PST ADULT - MUSCULOSKELETAL
normal/decreased ROM due to pain/strength 5/5 bilateral upper extremities/strength 5/5 bilateral lower extremities/abnormal gait details…

## 2022-10-03 NOTE — H&P PST ADULT - HISTORY OF PRESENT ILLNESS
66 y/o female smoker with hx of diabetes type 2, HTn , HLD presents to [facility] for scheduled laminectomy l4/5 lumbar fusion on 10/11/22. Patient reports lower back pain radiating to hips and and lower extremities over the last few months. She was seen by orthopedic and surgical intervention recommend.

## 2022-10-03 NOTE — H&P PST ADULT - ASSESSMENT
66 y/o female smoker with hx of diabetes type 2, HTn , HLD presents to [facility] for scheduled laminectomy l4/5 lumbar fusion on 10/11/22.     CAPRINI SCORE    AGE RELATED RISK FACTORS                                                       MOBILITY RELATED FACTORS  [ ] Age 41-60 years                                            (1 Point)                  [ ] Bed rest                                                        (1 Point)  [x ] Age: 61-74 years                                           (2 Points)                [ ] Plaster cast                                                   (2 Points)  [ ] Age= 75 years                                              (3 Points)                 [ ] Bed bound for more than 72 hours                   (2 Points)    DISEASE RELATED RISK FACTORS                                               GENDER SPECIFIC FACTORS  [ ] Edema in the lower extremities                       (1 Point)                  [ ] Pregnancy                                                     (1 Point)  [ ] Varicose veins                                               (1 Point)                  [ ] Post-partum < 6 weeks                                   (1 Point)             [x ] BMI > 25 Kg/m2                                            (1 Point)                  [ ] Hormonal therapy  or oral contraception            (1 Point)                 [ ] Sepsis (in the previous month)                        (1 Point)                  [ ] History of pregnancy complications  [ ] Pneumonia or serious lung disease                                               [ ] Unexplained or recurrent                       (1 Point)           (in the previous month)                               (1 Point)  [ ] Abnormal pulmonary function test                     (1 Point)                 SURGERY RELATED RISK FACTORS  [ ] Acute myocardial infarction                              (1 Point)                 [ ]  Section                                            (1 Point)  [ ] Congestive heart failure (in the previous month)  (1 Point)                 [ ] Minor surgery                                                 (1 Point)   [ ] Inflammatory bowel disease                             (1 Point)                 [ ] Arthroscopic surgery                                        (2 Points)  [ ] Central venous access                                    (2 Points)                [x ] General surgery lasting more than 45 minutes   (2 Points)       [ ] Stroke (in the previous month)                          (5 Points)               [ ] Elective arthroplasty                                        (5 Points)            [ ] malignancy                                                             (2 points)                                                                                                                                 HEMATOLOGY RELATED FACTORS                                                 TRAUMA RELATED RISK FACTORS  [ ] Prior episodes of VTE                                     (3 Points)                 [ ] Fracture of the hip, pelvis, or leg                       (5 Points)  [ ] Positive family history for VTE                         (3 Points)                 [ ] Acute spinal cord injury (in the previous month)  (5 Points)  [ ] Prothrombin 47855 A                                      (3 Points)                 [ ] Paralysis  (less than 1 month)                          (5 Points)  [ ] Factor V Leiden                                             (3 Points)                 [ ] Multiple Trauma within 1 month                         (5 Points)  [ ] Lupus anticoagulants                                     (3 Points)                                                           [ ] Anticardiolipin antibodies                                (3 Points)                                                       [ ] High homocysteine in the blood                      (3 Points)                                             [ ] Other congenital or acquired thrombophilia       (3 Points)                                                [ ] Heparin induced thrombocytopenia                  (3 Points)                                          Total Score [  4        ]  The Caprini score indicates this patient is at risk for a VTE event (score 3-5).  Most surgical patients in this group would benefit from pharmacologic prophylaxis.  The surgical team will determine the balance between VTE risk and bleeding risk

## 2022-10-03 NOTE — H&P PST ADULT - NSICDXPASTMEDICALHX_GEN_ALL_CORE_FT
PAST MEDICAL HISTORY:  Anxiety and depression     DM type 2 (diabetes mellitus, type 2)     HLD (hyperlipidemia)     HTN (hypertension)     Lower back pain     Renal calculi      PAST MEDICAL HISTORY:  Anxiety and depression     At risk for obstructive sleep apnea DHRUV precautions. Pt >3 criteria on STOP-Bang questionnaire.    DM type 2 (diabetes mellitus, type 2)     HLD (hyperlipidemia)     HTN (hypertension)     Lower back pain     Renal calculi

## 2022-10-03 NOTE — H&P PST ADULT - PROBLEM SELECTOR PLAN 1
Pre op and chlorhexidine instructions given and explained.  Avoid NSAIDs and OTC supplements.   Patient verbalized understanding  medical consult requested by surgeon 10/4/22  covid 19 swab scheduled  10/8/22, advised to quarantine after test

## 2022-10-03 NOTE — H&P PST ADULT - NSANTHOSAYNRD_GEN_A_CORE
No. DHRUV screening performed.  STOP BANG Legend: 0-2 = LOW Risk; 3-4 = INTERMEDIATE Risk; 5-8 = HIGH Risk

## 2022-10-04 DIAGNOSIS — M53.2X6 SPINAL INSTABILITIES, LUMBAR REGION: ICD-10-CM

## 2022-10-04 DIAGNOSIS — Z01.818 ENCOUNTER FOR OTHER PREPROCEDURAL EXAMINATION: ICD-10-CM

## 2022-10-10 RX ORDER — TRANEXAMIC ACID 100 MG/ML
1000 INJECTION, SOLUTION INTRAVENOUS ONCE
Refills: 0 | Status: DISCONTINUED | OUTPATIENT
Start: 2022-10-11 | End: 2022-10-13

## 2022-10-10 RX ORDER — TRANEXAMIC ACID 100 MG/ML
1 INJECTION, SOLUTION INTRAVENOUS
Qty: 1000 | Refills: 0 | Status: DISCONTINUED | OUTPATIENT
Start: 2022-10-11 | End: 2022-10-13

## 2022-10-11 ENCOUNTER — TRANSCRIPTION ENCOUNTER (OUTPATIENT)
Age: 67
End: 2022-10-11

## 2022-10-11 ENCOUNTER — INPATIENT (INPATIENT)
Facility: HOSPITAL | Age: 67
LOS: 5 days | Discharge: HOME HEALTH | DRG: 453 | End: 2022-10-17
Attending: ORTHOPAEDIC SURGERY | Admitting: ORTHOPAEDIC SURGERY
Payer: MEDICARE

## 2022-10-11 ENCOUNTER — RESULT REVIEW (OUTPATIENT)
Age: 67
End: 2022-10-11

## 2022-10-11 VITALS
TEMPERATURE: 98 F | SYSTOLIC BLOOD PRESSURE: 122 MMHG | RESPIRATION RATE: 16 BRPM | WEIGHT: 220.02 LBS | DIASTOLIC BLOOD PRESSURE: 74 MMHG | OXYGEN SATURATION: 97 % | HEIGHT: 62 IN | HEART RATE: 62 BPM

## 2022-10-11 DIAGNOSIS — M53.2X6 SPINAL INSTABILITIES, LUMBAR REGION: ICD-10-CM

## 2022-10-11 DIAGNOSIS — Z98.1 ARTHRODESIS STATUS: Chronic | ICD-10-CM

## 2022-10-11 DIAGNOSIS — Z98.891 HISTORY OF UTERINE SCAR FROM PREVIOUS SURGERY: Chronic | ICD-10-CM

## 2022-10-11 LAB
ANION GAP SERPL CALC-SCNC: 5 MMOL/L — SIGNIFICANT CHANGE UP (ref 5–17)
BASE EXCESS BLDA CALC-SCNC: -6.5 MMOL/L — LOW (ref -2–3)
BASE EXCESS BLDA CALC-SCNC: -8.2 MMOL/L — LOW (ref -2–3)
BASOPHILS # BLD AUTO: 0.06 K/UL — SIGNIFICANT CHANGE UP (ref 0–0.2)
BASOPHILS NFR BLD AUTO: 0.3 % — SIGNIFICANT CHANGE UP (ref 0–2)
BLOOD GAS COMMENTS ARTERIAL: SIGNIFICANT CHANGE UP
BLOOD GAS COMMENTS ARTERIAL: SIGNIFICANT CHANGE UP
BUN SERPL-MCNC: 26 MG/DL — HIGH (ref 7–23)
CALCIUM SERPL-MCNC: 8.9 MG/DL — SIGNIFICANT CHANGE UP (ref 8.5–10.1)
CHLORIDE SERPL-SCNC: 110 MMOL/L — HIGH (ref 96–108)
CO2 BLDA-SCNC: 22 MMOL/L — SIGNIFICANT CHANGE UP (ref 19–24)
CO2 BLDA-SCNC: 30 MMOL/L — HIGH (ref 19–24)
CO2 SERPL-SCNC: 23 MMOL/L — SIGNIFICANT CHANGE UP (ref 22–31)
CREAT SERPL-MCNC: 1.26 MG/DL — SIGNIFICANT CHANGE UP (ref 0.5–1.3)
EGFR: 46 ML/MIN/1.73M2 — LOW
EOSINOPHIL # BLD AUTO: 0.04 K/UL — SIGNIFICANT CHANGE UP (ref 0–0.5)
EOSINOPHIL NFR BLD AUTO: 0.2 % — SIGNIFICANT CHANGE UP (ref 0–6)
GAS PNL BLDA: SIGNIFICANT CHANGE UP
GAS PNL BLDA: SIGNIFICANT CHANGE UP
GLUCOSE SERPL-MCNC: 227 MG/DL — HIGH (ref 70–99)
HCO3 BLDA-SCNC: 21 MMOL/L — SIGNIFICANT CHANGE UP (ref 21–28)
HCO3 BLDA-SCNC: 26 MMOL/L — SIGNIFICANT CHANGE UP (ref 21–28)
HCT VFR BLD CALC: 41.3 % — SIGNIFICANT CHANGE UP (ref 34.5–45)
HCT VFR BLD CALC: 44.6 % — SIGNIFICANT CHANGE UP (ref 34.5–45)
HGB BLD-MCNC: 13 G/DL — SIGNIFICANT CHANGE UP (ref 11.5–15.5)
HGB BLD-MCNC: 13.7 G/DL — SIGNIFICANT CHANGE UP (ref 11.5–15.5)
IMM GRANULOCYTES NFR BLD AUTO: 1 % — HIGH (ref 0–0.9)
LYMPHOCYTES # BLD AUTO: 1.72 K/UL — SIGNIFICANT CHANGE UP (ref 1–3.3)
LYMPHOCYTES # BLD AUTO: 9.6 % — LOW (ref 13–44)
MCHC RBC-ENTMCNC: 28.2 PG — SIGNIFICANT CHANGE UP (ref 27–34)
MCHC RBC-ENTMCNC: 31.5 GM/DL — LOW (ref 32–36)
MCV RBC AUTO: 89.6 FL — SIGNIFICANT CHANGE UP (ref 80–100)
MONOCYTES # BLD AUTO: 0.31 K/UL — SIGNIFICANT CHANGE UP (ref 0–0.9)
MONOCYTES NFR BLD AUTO: 1.7 % — LOW (ref 2–14)
NEUTROPHILS # BLD AUTO: 15.63 K/UL — HIGH (ref 1.8–7.4)
NEUTROPHILS NFR BLD AUTO: 87.2 % — HIGH (ref 43–77)
PCO2 BLDA: 113 MMHG — CRITICAL HIGH (ref 32–35)
PCO2 BLDA: 46 MMHG — HIGH (ref 32–35)
PH BLDA: 6.97 — CRITICAL LOW (ref 7.35–7.45)
PH BLDA: 7.26 — LOW (ref 7.35–7.45)
PLATELET # BLD AUTO: 287 K/UL — SIGNIFICANT CHANGE UP (ref 150–400)
PO2 BLDA: 100 MMHG — SIGNIFICANT CHANGE UP (ref 83–108)
PO2 BLDA: 134 MMHG — HIGH (ref 83–108)
POTASSIUM SERPL-MCNC: 5 MMOL/L — SIGNIFICANT CHANGE UP (ref 3.5–5.3)
POTASSIUM SERPL-SCNC: 5 MMOL/L — SIGNIFICANT CHANGE UP (ref 3.5–5.3)
RBC # BLD: 4.61 M/UL — SIGNIFICANT CHANGE UP (ref 3.8–5.2)
RBC # FLD: 15.1 % — HIGH (ref 10.3–14.5)
SAO2 % BLDA: 98 % — SIGNIFICANT CHANGE UP
SAO2 % BLDA: 99 % — SIGNIFICANT CHANGE UP
SODIUM SERPL-SCNC: 138 MMOL/L — SIGNIFICANT CHANGE UP (ref 135–145)
WBC # BLD: 17.94 K/UL — HIGH (ref 3.8–10.5)
WBC # FLD AUTO: 17.94 K/UL — HIGH (ref 3.8–10.5)

## 2022-10-11 PROCEDURE — 87521 HEPATITIS C PROBE&RVRS TRNSC: CPT

## 2022-10-11 PROCEDURE — 85018 HEMOGLOBIN: CPT

## 2022-10-11 PROCEDURE — 72100 X-RAY EXAM L-S SPINE 2/3 VWS: CPT

## 2022-10-11 PROCEDURE — C9113: CPT

## 2022-10-11 PROCEDURE — C1713: CPT

## 2022-10-11 PROCEDURE — 82803 BLOOD GASES ANY COMBINATION: CPT

## 2022-10-11 PROCEDURE — 86891 AUTOLOGOUS BLOOD OP SALVAGE: CPT

## 2022-10-11 PROCEDURE — 71045 X-RAY EXAM CHEST 1 VIEW: CPT

## 2022-10-11 PROCEDURE — 36415 COLL VENOUS BLD VENIPUNCTURE: CPT

## 2022-10-11 PROCEDURE — 88304 TISSUE EXAM BY PATHOLOGIST: CPT | Mod: 26

## 2022-10-11 PROCEDURE — 85025 COMPLETE CBC W/AUTO DIFF WBC: CPT

## 2022-10-11 PROCEDURE — 82962 GLUCOSE BLOOD TEST: CPT

## 2022-10-11 PROCEDURE — 76000 FLUOROSCOPY <1 HR PHYS/QHP: CPT

## 2022-10-11 PROCEDURE — 94002 VENT MGMT INPAT INIT DAY: CPT

## 2022-10-11 PROCEDURE — 88304 TISSUE EXAM BY PATHOLOGIST: CPT

## 2022-10-11 PROCEDURE — C1889: CPT

## 2022-10-11 PROCEDURE — 97530 THERAPEUTIC ACTIVITIES: CPT | Mod: GP

## 2022-10-11 PROCEDURE — 85027 COMPLETE CBC AUTOMATED: CPT

## 2022-10-11 PROCEDURE — 72100 X-RAY EXAM L-S SPINE 2/3 VWS: CPT | Mod: 26

## 2022-10-11 PROCEDURE — 71045 X-RAY EXAM CHEST 1 VIEW: CPT | Mod: 26

## 2022-10-11 PROCEDURE — 36600 WITHDRAWAL OF ARTERIAL BLOOD: CPT

## 2022-10-11 PROCEDURE — 80048 BASIC METABOLIC PNL TOTAL CA: CPT

## 2022-10-11 PROCEDURE — 86803 HEPATITIS C AB TEST: CPT

## 2022-10-11 PROCEDURE — 97116 GAIT TRAINING THERAPY: CPT | Mod: GP

## 2022-10-11 PROCEDURE — 84100 ASSAY OF PHOSPHORUS: CPT

## 2022-10-11 PROCEDURE — 85014 HEMATOCRIT: CPT

## 2022-10-11 PROCEDURE — 83735 ASSAY OF MAGNESIUM: CPT

## 2022-10-11 PROCEDURE — 80053 COMPREHEN METABOLIC PANEL: CPT

## 2022-10-11 RX ORDER — INSULIN LISPRO 100/ML
5 VIAL (ML) SUBCUTANEOUS ONCE
Refills: 0 | Status: COMPLETED | OUTPATIENT
Start: 2022-10-11 | End: 2022-10-11

## 2022-10-11 RX ORDER — OXYCODONE HYDROCHLORIDE 5 MG/1
10 TABLET ORAL
Refills: 0 | Status: DISCONTINUED | OUTPATIENT
Start: 2022-10-11 | End: 2022-10-17

## 2022-10-11 RX ORDER — CEFAZOLIN SODIUM 1 G
2000 VIAL (EA) INJECTION EVERY 8 HOURS
Refills: 0 | Status: COMPLETED | OUTPATIENT
Start: 2022-10-11 | End: 2022-10-12

## 2022-10-11 RX ORDER — CELECOXIB 200 MG/1
200 CAPSULE ORAL DAILY
Refills: 0 | Status: DISCONTINUED | OUTPATIENT
Start: 2022-10-12 | End: 2022-10-13

## 2022-10-11 RX ORDER — LABETALOL HCL 100 MG
10 TABLET ORAL ONCE
Refills: 0 | Status: COMPLETED | OUTPATIENT
Start: 2022-10-11 | End: 2022-10-11

## 2022-10-11 RX ORDER — OXYCODONE HYDROCHLORIDE 5 MG/1
5 TABLET ORAL
Refills: 0 | Status: DISCONTINUED | OUTPATIENT
Start: 2022-10-11 | End: 2022-10-17

## 2022-10-11 RX ORDER — HYDROMORPHONE HYDROCHLORIDE 2 MG/ML
0.5 INJECTION INTRAMUSCULAR; INTRAVENOUS; SUBCUTANEOUS
Refills: 0 | Status: DISCONTINUED | OUTPATIENT
Start: 2022-10-11 | End: 2022-10-11

## 2022-10-11 RX ORDER — ATORVASTATIN CALCIUM 80 MG/1
40 TABLET, FILM COATED ORAL DAILY
Refills: 0 | Status: DISCONTINUED | OUTPATIENT
Start: 2022-10-11 | End: 2022-10-11

## 2022-10-11 RX ORDER — FENOFIBRATE,MICRONIZED 130 MG
145 CAPSULE ORAL DAILY
Refills: 0 | Status: DISCONTINUED | OUTPATIENT
Start: 2022-10-11 | End: 2022-10-17

## 2022-10-11 RX ORDER — HYDROMORPHONE HYDROCHLORIDE 2 MG/ML
30 INJECTION INTRAMUSCULAR; INTRAVENOUS; SUBCUTANEOUS
Refills: 0 | Status: DISCONTINUED | OUTPATIENT
Start: 2022-10-11 | End: 2022-10-11

## 2022-10-11 RX ORDER — OXYCODONE HYDROCHLORIDE 5 MG/1
5 TABLET ORAL ONCE
Refills: 0 | Status: DISCONTINUED | OUTPATIENT
Start: 2022-10-11 | End: 2022-10-11

## 2022-10-11 RX ORDER — FENTANYL CITRATE 50 UG/ML
50 INJECTION INTRAVENOUS
Refills: 0 | Status: DISCONTINUED | OUTPATIENT
Start: 2022-10-11 | End: 2022-10-11

## 2022-10-11 RX ORDER — CHLORHEXIDINE GLUCONATE 213 G/1000ML
1 SOLUTION TOPICAL
Refills: 0 | Status: DISCONTINUED | OUTPATIENT
Start: 2022-10-11 | End: 2022-10-13

## 2022-10-11 RX ORDER — CELECOXIB 200 MG/1
400 CAPSULE ORAL ONCE
Refills: 0 | Status: COMPLETED | OUTPATIENT
Start: 2022-10-11 | End: 2022-10-11

## 2022-10-11 RX ORDER — SODIUM CHLORIDE 9 MG/ML
500 INJECTION, SOLUTION INTRAVENOUS ONCE
Refills: 0 | Status: COMPLETED | OUTPATIENT
Start: 2022-10-11 | End: 2022-10-11

## 2022-10-11 RX ORDER — ONDANSETRON 8 MG/1
4 TABLET, FILM COATED ORAL EVERY 6 HOURS
Refills: 0 | Status: DISCONTINUED | OUTPATIENT
Start: 2022-10-11 | End: 2022-10-11

## 2022-10-11 RX ORDER — SERTRALINE 25 MG/1
250 TABLET, FILM COATED ORAL DAILY
Refills: 0 | Status: DISCONTINUED | OUTPATIENT
Start: 2022-10-11 | End: 2022-10-17

## 2022-10-11 RX ORDER — NALOXONE HYDROCHLORIDE 4 MG/.1ML
0.1 SPRAY NASAL
Refills: 0 | Status: DISCONTINUED | OUTPATIENT
Start: 2022-10-11 | End: 2022-10-11

## 2022-10-11 RX ORDER — ONDANSETRON 8 MG/1
4 TABLET, FILM COATED ORAL ONCE
Refills: 0 | Status: DISCONTINUED | OUTPATIENT
Start: 2022-10-11 | End: 2022-10-11

## 2022-10-11 RX ORDER — ATORVASTATIN CALCIUM 80 MG/1
40 TABLET, FILM COATED ORAL AT BEDTIME
Refills: 0 | Status: DISCONTINUED | OUTPATIENT
Start: 2022-10-11 | End: 2022-10-17

## 2022-10-11 RX ORDER — PROPOFOL 10 MG/ML
20 INJECTION, EMULSION INTRAVENOUS
Qty: 1000 | Refills: 0 | Status: DISCONTINUED | OUTPATIENT
Start: 2022-10-11 | End: 2022-10-12

## 2022-10-11 RX ORDER — GABAPENTIN 400 MG/1
300 CAPSULE ORAL
Refills: 0 | Status: DISCONTINUED | OUTPATIENT
Start: 2022-10-11 | End: 2022-10-17

## 2022-10-11 RX ORDER — ACETAMINOPHEN 500 MG
650 TABLET ORAL EVERY 6 HOURS
Refills: 0 | Status: DISCONTINUED | OUTPATIENT
Start: 2022-10-11 | End: 2022-10-17

## 2022-10-11 RX ORDER — HYDROMORPHONE HYDROCHLORIDE 2 MG/ML
1 INJECTION INTRAMUSCULAR; INTRAVENOUS; SUBCUTANEOUS
Refills: 0 | Status: DISCONTINUED | OUTPATIENT
Start: 2022-10-11 | End: 2022-10-17

## 2022-10-11 RX ORDER — METOPROLOL TARTRATE 50 MG
100 TABLET ORAL DAILY
Refills: 0 | Status: DISCONTINUED | OUTPATIENT
Start: 2022-10-11 | End: 2022-10-17

## 2022-10-11 RX ORDER — METFORMIN HYDROCHLORIDE 850 MG/1
1000 TABLET ORAL DAILY
Refills: 0 | Status: DISCONTINUED | OUTPATIENT
Start: 2022-10-11 | End: 2022-10-12

## 2022-10-11 RX ORDER — CYCLOBENZAPRINE HYDROCHLORIDE 10 MG/1
10 TABLET, FILM COATED ORAL EVERY 8 HOURS
Refills: 0 | Status: DISCONTINUED | OUTPATIENT
Start: 2022-10-11 | End: 2022-10-17

## 2022-10-11 RX ORDER — LABETALOL HCL 100 MG
10 TABLET ORAL ONCE
Refills: 0 | Status: COMPLETED | OUTPATIENT
Start: 2022-10-11 | End: 2023-09-09

## 2022-10-11 RX ORDER — MIDAZOLAM HYDROCHLORIDE 1 MG/ML
2 INJECTION, SOLUTION INTRAMUSCULAR; INTRAVENOUS
Refills: 0 | Status: DISCONTINUED | OUTPATIENT
Start: 2022-10-11 | End: 2022-10-12

## 2022-10-11 RX ORDER — SODIUM CHLORIDE 9 MG/ML
1000 INJECTION INTRAMUSCULAR; INTRAVENOUS; SUBCUTANEOUS
Refills: 0 | Status: DISCONTINUED | OUTPATIENT
Start: 2022-10-11 | End: 2022-10-13

## 2022-10-11 RX ADMIN — HYDROMORPHONE HYDROCHLORIDE 0.5 MILLIGRAM(S): 2 INJECTION INTRAMUSCULAR; INTRAVENOUS; SUBCUTANEOUS at 17:44

## 2022-10-11 RX ADMIN — PROPOFOL 12 MICROGRAM(S)/KG/MIN: 10 INJECTION, EMULSION INTRAVENOUS at 22:20

## 2022-10-11 RX ADMIN — NALOXONE HYDROCHLORIDE 0.1 MILLIGRAM(S): 4 SPRAY NASAL at 18:56

## 2022-10-11 RX ADMIN — SODIUM CHLORIDE 2000 MILLILITER(S): 9 INJECTION, SOLUTION INTRAVENOUS at 21:15

## 2022-10-11 RX ADMIN — Medication 100 MILLIGRAM(S): at 22:43

## 2022-10-11 RX ADMIN — Medication 5 UNIT(S): at 20:47

## 2022-10-11 RX ADMIN — SODIUM CHLORIDE 125 MILLILITER(S): 9 INJECTION INTRAMUSCULAR; INTRAVENOUS; SUBCUTANEOUS at 17:27

## 2022-10-11 RX ADMIN — Medication 10 MILLIGRAM(S): at 20:15

## 2022-10-11 RX ADMIN — HYDROMORPHONE HYDROCHLORIDE 30 MILLILITER(S): 2 INJECTION INTRAMUSCULAR; INTRAVENOUS; SUBCUTANEOUS at 17:25

## 2022-10-11 RX ADMIN — FENTANYL CITRATE 50 MICROGRAM(S): 50 INJECTION INTRAVENOUS at 23:45

## 2022-10-11 RX ADMIN — SODIUM CHLORIDE 1000 MILLILITER(S): 9 INJECTION, SOLUTION INTRAVENOUS at 21:30

## 2022-10-11 NOTE — PATIENT PROFILE ADULT - FALL HARM RISK - UNIVERSAL INTERVENTIONS
Bed in lowest position, wheels locked, appropriate side rails in place/Call bell, personal items and telephone in reach/Instruct patient to call for assistance before getting out of bed or chair/Non-slip footwear when patient is out of bed/[place] to call system/Physically safe environment - no spills, clutter or unnecessary equipment/Purposeful Proactive Rounding/Room/bathroom lighting operational, light cord in reach

## 2022-10-11 NOTE — H&P ADULT - ASSESSMENT
66 y/o F with PMHx type 2 DM, HTN, and HLD admitted with:    1. Acute hypercapnic respiratory failure  2.     PLAN: 66 y/o F with PMHx type 2 DM, HTN, and HLD admitted with:    1. Acute hypercapnic respiratory failure, drug induced  2. Distributive shock  3. Spinal stenosis s/p L4/L5 laminectomy - POD 0    PLAN:  - Sedated with propofol infusion to facilitate ventilator synchrony.  - Transition to precedex in preparation for extubation.  - Versed 2mg q2 hrs PRN for agitation.  - Actively titrating levophed to maintain MAP > 65. Hypotension 2/2 sedatives.  - Hold antihypertensive regimen.  - Post intubation ABG improved (7.26/46/134/21). Appropriate ventilator adjustments were made.  - NPO. If unable to be extubated, start tube feeds.  - GI prophylaxis.  - Post op abx.  - Diabetes management.  - Mechanical DVT prophylaxis with SCDs.  - Monitor JAMSHID drain output.    Case discussed with eICU attending, Dr. Shelton.    CRITICAL CARE TIME SPENT: 43 minutes

## 2022-10-11 NOTE — H&P ADULT - HISTORY OF PRESENT ILLNESS
68 y/o F with PMHx type 2 DM, HTN, and HLD who presented to  for elective laminectomy L4/L5 lumbar fusion. Post procedure, pt developed worsening lethargy. An ABG was obtained which revealed severe respiratory acidosis (6.97/113/100/26). Pt was urgently intubated by anesthesia. Admitted to ICU for continuation of care.

## 2022-10-11 NOTE — BRIEF OPERATIVE NOTE - NSICDXBRIEFPREOP_GEN_ALL_CORE_FT
PRE-OP DIAGNOSIS:  Herniated intervertebral disc of lumbar spine 11-Oct-2022 16:23:44  Francis Yang  Spinal stenosis of lumbar region with neurogenic claudication 11-Oct-2022 16:23:58  Francis Yang

## 2022-10-11 NOTE — H&P ADULT - NSHPPHYSICALEXAM_GEN_ALL_CORE
General: Intubated.  Eyes: PERRL.  Neck: Supple.  Heart: Regular rate and rhythm, +s1/s2.  Lungs: Clear to auscultation b/l.  Abdomen: Soft, nontender, nondistended.  Skin: Warm, dry, intact.  Extremities: No edema.  Neuro: Sedated.

## 2022-10-11 NOTE — BRIEF OPERATIVE NOTE - NSICDXBRIEFPOSTOP_GEN_ALL_CORE_FT
POST-OP DIAGNOSIS:  Spinal stenosis of lumbar region with neurogenic claudication 11-Oct-2022 16:24:52  Francis Yang   POST-OP DIAGNOSIS:  Spinal stenosis of lumbar region with neurogenic claudication 11-Oct-2022 16:24:52  Francis Yang  Obesity, morbid 11-Oct-2022 17:21:06  Francis Yang

## 2022-10-11 NOTE — H&P ADULT - NSICDXPASTMEDICALHX_GEN_ALL_CORE_FT
PAST MEDICAL HISTORY:  Anxiety and depression     At risk for obstructive sleep apnea DHRUV precautions. Pt >3 criteria on STOP-Bang questionnaire.    DM type 2 (diabetes mellitus, type 2)     HLD (hyperlipidemia)     HTN (hypertension)     Lower back pain     Renal calculi

## 2022-10-11 NOTE — BRIEF OPERATIVE NOTE - NSICDXBRIEFPROCEDURE_GEN_ALL_CORE_FT
PROCEDURES:  Laminectomy with osteotomy and fusion of lumbar spine with instrumentation 11-Oct-2022 16:22:44  Francis Yang

## 2022-10-11 NOTE — PATIENT PROFILE ADULT - CENTRAL VENOUS CATHETER/PICC LINE
no

## 2022-10-12 DIAGNOSIS — M96.1 POSTLAMINECTOMY SYNDROME, NOT ELSEWHERE CLASSIFIED: ICD-10-CM

## 2022-10-12 LAB
ALBUMIN SERPL ELPH-MCNC: 2.9 G/DL — LOW (ref 3.3–5)
ALP SERPL-CCNC: 32 U/L — LOW (ref 40–120)
ALT FLD-CCNC: 90 U/L — HIGH (ref 12–78)
ANION GAP SERPL CALC-SCNC: 8 MMOL/L — SIGNIFICANT CHANGE UP (ref 5–17)
AST SERPL-CCNC: 141 U/L — HIGH (ref 15–37)
BASE EXCESS BLDA CALC-SCNC: -5.7 MMOL/L — LOW (ref -2–3)
BASOPHILS # BLD AUTO: 0.06 K/UL — SIGNIFICANT CHANGE UP (ref 0–0.2)
BASOPHILS NFR BLD AUTO: 0.3 % — SIGNIFICANT CHANGE UP (ref 0–2)
BILIRUB SERPL-MCNC: 0.3 MG/DL — SIGNIFICANT CHANGE UP (ref 0.2–1.2)
BLOOD GAS COMMENTS ARTERIAL: SIGNIFICANT CHANGE UP
BUN SERPL-MCNC: 23 MG/DL — SIGNIFICANT CHANGE UP (ref 7–23)
CALCIUM SERPL-MCNC: 8.5 MG/DL — SIGNIFICANT CHANGE UP (ref 8.5–10.1)
CHLORIDE SERPL-SCNC: 113 MMOL/L — HIGH (ref 96–108)
CO2 BLDA-SCNC: 20 MMOL/L — SIGNIFICANT CHANGE UP (ref 19–24)
CO2 SERPL-SCNC: 21 MMOL/L — LOW (ref 22–31)
CREAT SERPL-MCNC: 1.04 MG/DL — SIGNIFICANT CHANGE UP (ref 0.5–1.3)
EGFR: 59 ML/MIN/1.73M2 — LOW
EOSINOPHIL # BLD AUTO: 0.01 K/UL — SIGNIFICANT CHANGE UP (ref 0–0.5)
EOSINOPHIL NFR BLD AUTO: 0 % — SIGNIFICANT CHANGE UP (ref 0–6)
GAS PNL BLDA: SIGNIFICANT CHANGE UP
GLUCOSE SERPL-MCNC: 149 MG/DL — HIGH (ref 70–99)
HCO3 BLDA-SCNC: 19 MMOL/L — LOW (ref 21–28)
HCT VFR BLD CALC: 34.8 % — SIGNIFICANT CHANGE UP (ref 34.5–45)
HCV AB S/CO SERPL IA: 14.33 S/CO — HIGH (ref 0–0.99)
HCV AB SERPL-IMP: REACTIVE
HGB BLD-MCNC: 11.3 G/DL — LOW (ref 11.5–15.5)
IMM GRANULOCYTES NFR BLD AUTO: 0.6 % — SIGNIFICANT CHANGE UP (ref 0–0.9)
LYMPHOCYTES # BLD AUTO: 13.5 % — SIGNIFICANT CHANGE UP (ref 13–44)
LYMPHOCYTES # BLD AUTO: 3.1 K/UL — SIGNIFICANT CHANGE UP (ref 1–3.3)
MAGNESIUM SERPL-MCNC: 1.6 MG/DL — SIGNIFICANT CHANGE UP (ref 1.6–2.6)
MCHC RBC-ENTMCNC: 28.6 PG — SIGNIFICANT CHANGE UP (ref 27–34)
MCHC RBC-ENTMCNC: 32.5 GM/DL — SIGNIFICANT CHANGE UP (ref 32–36)
MCV RBC AUTO: 88.1 FL — SIGNIFICANT CHANGE UP (ref 80–100)
MONOCYTES # BLD AUTO: 1.46 K/UL — HIGH (ref 0–0.9)
MONOCYTES NFR BLD AUTO: 6.4 % — SIGNIFICANT CHANGE UP (ref 2–14)
NEUTROPHILS # BLD AUTO: 18.19 K/UL — HIGH (ref 1.8–7.4)
NEUTROPHILS NFR BLD AUTO: 79.2 % — HIGH (ref 43–77)
PCO2 BLDA: 36 MMHG — HIGH (ref 32–35)
PH BLDA: 7.34 — LOW (ref 7.35–7.45)
PHOSPHATE SERPL-MCNC: 2.9 MG/DL — SIGNIFICANT CHANGE UP (ref 2.5–4.5)
PLATELET # BLD AUTO: 283 K/UL — SIGNIFICANT CHANGE UP (ref 150–400)
PO2 BLDA: 140 MMHG — HIGH (ref 83–108)
POTASSIUM SERPL-MCNC: 4.5 MMOL/L — SIGNIFICANT CHANGE UP (ref 3.5–5.3)
POTASSIUM SERPL-SCNC: 4.5 MMOL/L — SIGNIFICANT CHANGE UP (ref 3.5–5.3)
PROT SERPL-MCNC: 6.2 GM/DL — SIGNIFICANT CHANGE UP (ref 6–8.3)
RBC # BLD: 3.95 M/UL — SIGNIFICANT CHANGE UP (ref 3.8–5.2)
RBC # FLD: 15 % — HIGH (ref 10.3–14.5)
SAO2 % BLDA: 100 % — SIGNIFICANT CHANGE UP
SODIUM SERPL-SCNC: 142 MMOL/L — SIGNIFICANT CHANGE UP (ref 135–145)
WBC # BLD: 22.96 K/UL — HIGH (ref 3.8–10.5)
WBC # FLD AUTO: 22.96 K/UL — HIGH (ref 3.8–10.5)

## 2022-10-12 PROCEDURE — 99291 CRITICAL CARE FIRST HOUR: CPT

## 2022-10-12 PROCEDURE — G0508: CPT | Mod: GC,95

## 2022-10-12 RX ORDER — GLUCAGON INJECTION, SOLUTION 0.5 MG/.1ML
1 INJECTION, SOLUTION SUBCUTANEOUS ONCE
Refills: 0 | Status: DISCONTINUED | OUTPATIENT
Start: 2022-10-12 | End: 2022-10-12

## 2022-10-12 RX ORDER — SODIUM CHLORIDE 9 MG/ML
1000 INJECTION, SOLUTION INTRAVENOUS ONCE
Refills: 0 | Status: COMPLETED | OUTPATIENT
Start: 2022-10-12 | End: 2022-10-12

## 2022-10-12 RX ORDER — FENTANYL CITRATE 50 UG/ML
0.5 INJECTION INTRAVENOUS
Qty: 2500 | Refills: 0 | Status: DISCONTINUED | OUTPATIENT
Start: 2022-10-12 | End: 2022-10-12

## 2022-10-12 RX ORDER — NOREPINEPHRINE BITARTRATE/D5W 8 MG/250ML
0.05 PLASTIC BAG, INJECTION (ML) INTRAVENOUS
Qty: 8 | Refills: 0 | Status: DISCONTINUED | OUTPATIENT
Start: 2022-10-12 | End: 2022-10-12

## 2022-10-12 RX ORDER — GLUCAGON INJECTION, SOLUTION 0.5 MG/.1ML
1 INJECTION, SOLUTION SUBCUTANEOUS ONCE
Refills: 0 | Status: DISCONTINUED | OUTPATIENT
Start: 2022-10-12 | End: 2022-10-17

## 2022-10-12 RX ORDER — INSULIN LISPRO 100/ML
VIAL (ML) SUBCUTANEOUS EVERY 6 HOURS
Refills: 0 | Status: DISCONTINUED | OUTPATIENT
Start: 2022-10-12 | End: 2022-10-12

## 2022-10-12 RX ORDER — INSULIN LISPRO 100/ML
VIAL (ML) SUBCUTANEOUS
Refills: 0 | Status: DISCONTINUED | OUTPATIENT
Start: 2022-10-12 | End: 2022-10-17

## 2022-10-12 RX ORDER — SODIUM CHLORIDE 9 MG/ML
1000 INJECTION, SOLUTION INTRAVENOUS
Refills: 0 | Status: DISCONTINUED | OUTPATIENT
Start: 2022-10-12 | End: 2022-10-17

## 2022-10-12 RX ORDER — DEXTROSE 50 % IN WATER 50 %
12.5 SYRINGE (ML) INTRAVENOUS ONCE
Refills: 0 | Status: DISCONTINUED | OUTPATIENT
Start: 2022-10-12 | End: 2022-10-12

## 2022-10-12 RX ORDER — DEXTROSE 50 % IN WATER 50 %
25 SYRINGE (ML) INTRAVENOUS ONCE
Refills: 0 | Status: DISCONTINUED | OUTPATIENT
Start: 2022-10-12 | End: 2022-10-17

## 2022-10-12 RX ORDER — SODIUM CHLORIDE 9 MG/ML
1000 INJECTION, SOLUTION INTRAVENOUS
Refills: 0 | Status: DISCONTINUED | OUTPATIENT
Start: 2022-10-12 | End: 2022-10-12

## 2022-10-12 RX ORDER — NYSTATIN CREAM 100000 [USP'U]/G
1 CREAM TOPICAL
Refills: 0 | Status: DISCONTINUED | OUTPATIENT
Start: 2022-10-12 | End: 2022-10-17

## 2022-10-12 RX ORDER — MIRTAZAPINE 45 MG/1
15 TABLET, ORALLY DISINTEGRATING ORAL AT BEDTIME
Refills: 0 | Status: DISCONTINUED | OUTPATIENT
Start: 2022-10-12 | End: 2022-10-17

## 2022-10-12 RX ORDER — DIPHENHYDRAMINE HCL 50 MG
25 CAPSULE ORAL ONCE
Refills: 0 | Status: COMPLETED | OUTPATIENT
Start: 2022-10-12 | End: 2022-10-12

## 2022-10-12 RX ORDER — DEXTROSE 50 % IN WATER 50 %
25 SYRINGE (ML) INTRAVENOUS ONCE
Refills: 0 | Status: DISCONTINUED | OUTPATIENT
Start: 2022-10-12 | End: 2022-10-12

## 2022-10-12 RX ORDER — DEXTROSE 50 % IN WATER 50 %
15 SYRINGE (ML) INTRAVENOUS ONCE
Refills: 0 | Status: DISCONTINUED | OUTPATIENT
Start: 2022-10-12 | End: 2022-10-17

## 2022-10-12 RX ORDER — ALPRAZOLAM 0.25 MG
0.5 TABLET ORAL EVERY 8 HOURS
Refills: 0 | Status: DISCONTINUED | OUTPATIENT
Start: 2022-10-12 | End: 2022-10-13

## 2022-10-12 RX ORDER — DEXTROSE 50 % IN WATER 50 %
15 SYRINGE (ML) INTRAVENOUS ONCE
Refills: 0 | Status: DISCONTINUED | OUTPATIENT
Start: 2022-10-12 | End: 2022-10-12

## 2022-10-12 RX ORDER — PANTOPRAZOLE SODIUM 20 MG/1
40 TABLET, DELAYED RELEASE ORAL DAILY
Refills: 0 | Status: DISCONTINUED | OUTPATIENT
Start: 2022-10-12 | End: 2022-10-14

## 2022-10-12 RX ORDER — CHLORHEXIDINE GLUCONATE 213 G/1000ML
15 SOLUTION TOPICAL EVERY 12 HOURS
Refills: 0 | Status: DISCONTINUED | OUTPATIENT
Start: 2022-10-12 | End: 2022-10-12

## 2022-10-12 RX ORDER — DEXAMETHASONE 0.5 MG/5ML
4 ELIXIR ORAL ONCE
Refills: 0 | Status: COMPLETED | OUTPATIENT
Start: 2022-10-12 | End: 2022-10-12

## 2022-10-12 RX ORDER — DEXTROSE 50 % IN WATER 50 %
12.5 SYRINGE (ML) INTRAVENOUS ONCE
Refills: 0 | Status: DISCONTINUED | OUTPATIENT
Start: 2022-10-12 | End: 2022-10-17

## 2022-10-12 RX ORDER — DIPHENHYDRAMINE HCL 50 MG
25 CAPSULE ORAL EVERY 4 HOURS
Refills: 0 | Status: DISCONTINUED | OUTPATIENT
Start: 2022-10-12 | End: 2022-10-13

## 2022-10-12 RX ADMIN — CYCLOBENZAPRINE HYDROCHLORIDE 10 MILLIGRAM(S): 10 TABLET, FILM COATED ORAL at 13:06

## 2022-10-12 RX ADMIN — Medication 650 MILLIGRAM(S): at 18:11

## 2022-10-12 RX ADMIN — HYDROMORPHONE HYDROCHLORIDE 1 MILLIGRAM(S): 2 INJECTION INTRAMUSCULAR; INTRAVENOUS; SUBCUTANEOUS at 10:55

## 2022-10-12 RX ADMIN — HYDROMORPHONE HYDROCHLORIDE 1 MILLIGRAM(S): 2 INJECTION INTRAMUSCULAR; INTRAVENOUS; SUBCUTANEOUS at 04:33

## 2022-10-12 RX ADMIN — SODIUM CHLORIDE 1000 MILLILITER(S): 9 INJECTION, SOLUTION INTRAVENOUS at 00:18

## 2022-10-12 RX ADMIN — GABAPENTIN 300 MILLIGRAM(S): 400 CAPSULE ORAL at 10:55

## 2022-10-12 RX ADMIN — SODIUM CHLORIDE 125 MILLILITER(S): 9 INJECTION INTRAMUSCULAR; INTRAVENOUS; SUBCUTANEOUS at 05:53

## 2022-10-12 RX ADMIN — Medication 650 MILLIGRAM(S): at 13:06

## 2022-10-12 RX ADMIN — HYDROMORPHONE HYDROCHLORIDE 1 MILLIGRAM(S): 2 INJECTION INTRAMUSCULAR; INTRAVENOUS; SUBCUTANEOUS at 01:51

## 2022-10-12 RX ADMIN — HYDROMORPHONE HYDROCHLORIDE 1 MILLIGRAM(S): 2 INJECTION INTRAMUSCULAR; INTRAVENOUS; SUBCUTANEOUS at 03:59

## 2022-10-12 RX ADMIN — MIDAZOLAM HYDROCHLORIDE 2 MILLIGRAM(S): 1 INJECTION, SOLUTION INTRAMUSCULAR; INTRAVENOUS at 02:37

## 2022-10-12 RX ADMIN — Medication 25 MILLIGRAM(S): at 18:11

## 2022-10-12 RX ADMIN — Medication 25 MILLIGRAM(S): at 11:43

## 2022-10-12 RX ADMIN — Medication 25 MILLIGRAM(S): at 12:26

## 2022-10-12 RX ADMIN — PROPOFOL 12 MICROGRAM(S)/KG/MIN: 10 INJECTION, EMULSION INTRAVENOUS at 00:19

## 2022-10-12 RX ADMIN — Medication 4 MILLIGRAM(S): at 12:27

## 2022-10-12 RX ADMIN — Medication 145 MILLIGRAM(S): at 13:06

## 2022-10-12 RX ADMIN — SERTRALINE 50 MILLIGRAM(S): 25 TABLET, FILM COATED ORAL at 11:43

## 2022-10-12 RX ADMIN — ATORVASTATIN CALCIUM 40 MILLIGRAM(S): 80 TABLET, FILM COATED ORAL at 22:49

## 2022-10-12 RX ADMIN — CYCLOBENZAPRINE HYDROCHLORIDE 10 MILLIGRAM(S): 10 TABLET, FILM COATED ORAL at 22:49

## 2022-10-12 RX ADMIN — Medication 650 MILLIGRAM(S): at 23:16

## 2022-10-12 RX ADMIN — Medication 650 MILLIGRAM(S): at 18:42

## 2022-10-12 RX ADMIN — PANTOPRAZOLE SODIUM 40 MILLIGRAM(S): 20 TABLET, DELAYED RELEASE ORAL at 10:55

## 2022-10-12 RX ADMIN — MIRTAZAPINE 15 MILLIGRAM(S): 45 TABLET, ORALLY DISINTEGRATING ORAL at 23:16

## 2022-10-12 RX ADMIN — PROPOFOL 12 MICROGRAM(S)/KG/MIN: 10 INJECTION, EMULSION INTRAVENOUS at 08:08

## 2022-10-12 RX ADMIN — Medication 2: at 18:39

## 2022-10-12 RX ADMIN — HYDROMORPHONE HYDROCHLORIDE 1 MILLIGRAM(S): 2 INJECTION INTRAMUSCULAR; INTRAVENOUS; SUBCUTANEOUS at 15:17

## 2022-10-12 RX ADMIN — FENTANYL CITRATE 50 MICROGRAM(S): 50 INJECTION INTRAVENOUS at 00:16

## 2022-10-12 RX ADMIN — Medication 2: at 22:50

## 2022-10-12 RX ADMIN — CELECOXIB 200 MILLIGRAM(S): 200 CAPSULE ORAL at 12:30

## 2022-10-12 RX ADMIN — Medication 100 MILLIGRAM(S): at 13:06

## 2022-10-12 RX ADMIN — Medication 9.36 MICROGRAM(S)/KG/MIN: at 01:00

## 2022-10-12 RX ADMIN — PROPOFOL 12 MICROGRAM(S)/KG/MIN: 10 INJECTION, EMULSION INTRAVENOUS at 04:17

## 2022-10-12 RX ADMIN — GABAPENTIN 300 MILLIGRAM(S): 400 CAPSULE ORAL at 22:49

## 2022-10-12 RX ADMIN — CHLORHEXIDINE GLUCONATE 1 APPLICATION(S): 213 SOLUTION TOPICAL at 03:56

## 2022-10-12 RX ADMIN — Medication 0.5 MILLIGRAM(S): at 19:23

## 2022-10-12 RX ADMIN — FENTANYL CITRATE 4.99 MICROGRAM(S)/KG/HR: 50 INJECTION INTRAVENOUS at 03:32

## 2022-10-12 RX ADMIN — NYSTATIN CREAM 1 APPLICATION(S): 100000 CREAM TOPICAL at 22:50

## 2022-10-12 RX ADMIN — Medication 1 TABLET(S): at 10:55

## 2022-10-12 RX ADMIN — HYDROMORPHONE HYDROCHLORIDE 1 MILLIGRAM(S): 2 INJECTION INTRAMUSCULAR; INTRAVENOUS; SUBCUTANEOUS at 00:58

## 2022-10-12 RX ADMIN — HYDROMORPHONE HYDROCHLORIDE 1 MILLIGRAM(S): 2 INJECTION INTRAMUSCULAR; INTRAVENOUS; SUBCUTANEOUS at 11:55

## 2022-10-12 RX ADMIN — Medication 650 MILLIGRAM(S): at 14:00

## 2022-10-12 RX ADMIN — Medication 100 MILLIGRAM(S): at 06:09

## 2022-10-12 RX ADMIN — HYDROMORPHONE HYDROCHLORIDE 1 MILLIGRAM(S): 2 INJECTION INTRAMUSCULAR; INTRAVENOUS; SUBCUTANEOUS at 16:00

## 2022-10-12 RX ADMIN — CELECOXIB 200 MILLIGRAM(S): 200 CAPSULE ORAL at 11:43

## 2022-10-12 RX ADMIN — SODIUM CHLORIDE 75 MILLILITER(S): 9 INJECTION INTRAMUSCULAR; INTRAVENOUS; SUBCUTANEOUS at 18:38

## 2022-10-12 NOTE — PHYSICAL THERAPY INITIAL EVALUATION ADULT - MODALITIES TREATMENT COMMENTS
performed therex: QS, GS, APs, issued and instr in IS. nsg calling ASU/PACU re: brace, dtr calling pt's SO

## 2022-10-12 NOTE — PROGRESS NOTE ADULT - SUBJECTIVE AND OBJECTIVE BOX
POD#!1  intubated   arousable  moves all 4 ext well  JAMSHID  deep 80 last shift  superficial 60 last shift    22.96 wbc  11.3/34.8

## 2022-10-12 NOTE — AIRWAY REMOVAL NOTE  ADULT & PEDS - O2 DELIVERY METHOD
Pt states that she fell down the steps outside and hit her head on concrete. Pt is complaining of head pain. Pt denies LOC. Pt has obvious large hematoma on the front of forehead.    nasal cannula

## 2022-10-12 NOTE — PHYSICAL THERAPY INITIAL EVALUATION ADULT - PERTINENT HX OF CURRENT PROBLEM, REHAB EVAL
Underwent elective L4-5 lami with fusion 10/11  Post-op acute developed hypercapnic resp failure likely in the setting of anesthesia, obesity-requiring intubation/vent support, Hypotension-requiring pressor support. extubated this a.m, off pressor support.

## 2022-10-12 NOTE — PROGRESS NOTE ADULT - ASSESSMENT
67F PMH HTN, HLD, DM2, obesity, L4-5 disc herniation with claudication    Underwent elective L4-5 lami with fusion 10/11  Post-op acute hypercapnic resp failure likely in the setting of anesthesia, obesity    Now on vent, sedation, low dose pressor       Plan:     SBT, will extubate today   Will benefit from nocturnal NIV  Pain mx   IVF   Ancef 67F PMH HTN, HLD, DM2, obesity, L4-5 disc herniation with claudication    Underwent elective L4-5 lami with fusion 10/11  Post-op acute hypercapnic resp failure likely in the setting of anesthesia, obesity  Hypotension     Now on vent, sedation, low dose pressor       Plan:     SAT   SBT, will extubate today   Will benefit from nocturnal NIV  Pain mx   IVF   Ancef, 2 JAMSHID drains   Wean Levo   Vogt, restraints - will d/c later  DVT ppx with SCDs  67F PMH HTN, HLD, DM2, obesity, L4-5 disc herniation with claudication    Underwent elective L4-5 lami with fusion 10/11  Post-op acute hypercapnic resp failure likely in the setting of anesthesia, obesity  Hypotension     Now on vent, sedation, low dose pressor       Plan:     SAT   SBT, will extubate today   Will benefit from nocturnal NIV  Pain mx   IVF   Ancef, 2 JAMSHID drains   Wean Levo   Vogt, restraints - will d/c later  DVT ppx with SCDs   PT, OOB   Leucocytosis reactive - monitor   Insulin sliding scale     Patient critically ill  67F PMH HTN, HLD, DM2, obesity, L4-5 disc herniation with claudication    Underwent elective L4-5 lami with fusion 10/11  Post-op acute hypercapnic resp failure likely in the setting of anesthesia, obesity  Hypotension     Now on vent, sedation, low dose pressor       Plan:     SAT   SBT, will extubate today   Will benefit from nocturnal NIV  Pain mx   IVF   Ancef, 2 JAMSHID drains   Wean Levo   Vogt, restraints - will d/c later  DVT ppx with SCDs   PT, OOB   Leucocytosis reactive - monitor   Insulin sliding scale     Patient critically ill     Daughter updated at bedside

## 2022-10-12 NOTE — PROGRESS NOTE ADULT - SUBJECTIVE AND OBJECTIVE BOX
Patient is a 67y old  Female who presents with a chief complaint of Acute hypercapnic respiratory failure (12 Oct 2022 08:08)    24 hour events: intubated post lami for hypercapnic resp failure     PAST MEDICAL & SURGICAL HISTORY:  Renal calculi      DM type 2 (diabetes mellitus, type 2)      HTN (hypertension)      Lower back pain      HLD (hyperlipidemia)      Anxiety and depression      At risk for obstructive sleep apnea  DHRUV precautions. Pt &gt;3 criteria on STOP-Bang questionnaire.      S/P           Allergies    No Known Drug Allergies  strawberry (Other (Moderate))    Intolerances      REVIEW OF SYSTEMS: SEE BELOW       ICU Vital Signs Last 24 Hrs  T(C): 37.1 (12 Oct 2022 04:00), Max: 37.4 (11 Oct 2022 16:40)  T(F): 98.7 (12 Oct 2022 04:00), Max: 99.3 (11 Oct 2022 16:40)  HR: 57 (12 Oct 2022 06:00) (54 - 82)  BP: 121/50 (12 Oct 2022 06:00) (71/31 - 192/77)  BP(mean): 73 (12 Oct 2022 06:00) (43 - 93)  ABP: --  ABP(mean): --  RR: 21 (12 Oct 2022 06:00) (10 - 24)  SpO2: 100% (12 Oct 2022 06:00) (80% - 100%)    O2 Parameters below as of 12 Oct 2022 06:00  Patient On (Oxygen Delivery Method): ventilator    O2 Concentration (%): 50        CAPILLARY BLOOD GLUCOSE      POCT Blood Glucose.: 138 mg/dL (12 Oct 2022 06:03)  POCT Blood Glucose.: 202 mg/dL (12 Oct 2022 00:18)  POCT Blood Glucose.: 204 mg/dL (11 Oct 2022 20:06)  POCT Blood Glucose.: 212 mg/dL (11 Oct 2022 16:46)  POCT Blood Glucose.: 183 mg/dL (11 Oct 2022 15:54)  POCT Blood Glucose.: 130 mg/dL (11 Oct 2022 08:38)      I&O's Summary    11 Oct 2022 07:01  -  12 Oct 2022 07:00  --------------------------------------------------------  IN: 5921 mL / OUT: 2950 mL / NET: 2971 mL        Mode: AC/ CMV (Assist Control/ Continuous Mandatory Ventilation)  RR (machine): 24  TV (machine): 500  FiO2: 30  PEEP: 5  ITime: 1  PIP: 26      MEDICATIONS  (STANDING):  acetaminophen     Tablet .. 650 milliGRAM(s) Oral every 6 hours  atorvastatin 40 milliGRAM(s) Oral at bedtime  ceFAZolin   IVPB 2000 milliGRAM(s) IV Intermittent every 8 hours  celecoxib 200 milliGRAM(s) Oral daily  chlorhexidine 0.12% Liquid 15 milliLiter(s) Oral Mucosa every 12 hours  chlorhexidine 2% Cloths 1 Application(s) Topical <User Schedule>  cyclobenzaprine 10 milliGRAM(s) Oral every 8 hours  dextrose 5%. 1000 milliLiter(s) (50 mL/Hr) IV Continuous <Continuous>  dextrose 5%. 1000 milliLiter(s) (100 mL/Hr) IV Continuous <Continuous>  dextrose 50% Injectable 25 Gram(s) IV Push once  dextrose 50% Injectable 12.5 Gram(s) IV Push once  dextrose 50% Injectable 25 Gram(s) IV Push once  enalapril 5 milliGRAM(s) Oral daily  fenofibrate Tablet 145 milliGRAM(s) Oral daily  fentaNYL   Infusion. 0.5 MICROgram(s)/kG/Hr (4.99 mL/Hr) IV Continuous <Continuous>  gabapentin 300 milliGRAM(s) Oral two times a day  glucagon  Injectable 1 milliGRAM(s) IntraMuscular once  insulin lispro (ADMELOG) corrective regimen sliding scale   SubCutaneous every 6 hours  metoprolol succinate  milliGRAM(s) Oral daily  multivitamin 1 Tablet(s) Oral daily  norepinephrine Infusion 0.05 MICROgram(s)/kG/Min (9.36 mL/Hr) IV Continuous <Continuous>  pantoprazole  Injectable 40 milliGRAM(s) IV Push daily  propofol Infusion 20 MICROgram(s)/kG/Min (12 mL/Hr) IV Continuous <Continuous>  sertraline 50 milliGRAM(s) Oral daily  sodium chloride 0.9%. 1000 milliLiter(s) (125 mL/Hr) IV Continuous <Continuous>  tranexamic acid Infusion 1 mG/kG/Hr (51 mL/Hr) IV Continuous <Continuous>  tranexamic acid IVPB 1000 milliGRAM(s) IV Intermittent once      MEDICATIONS  (PRN):  dextrose Oral Gel 15 Gram(s) Oral once PRN Blood Glucose LESS THAN 70 milliGRAM(s)/deciliter  HYDROmorphone  Injectable 1 milliGRAM(s) IV Push every 3 hours PRN Severe Pain (7 - 10)  midazolam Injectable 2 milliGRAM(s) IV Push every 2 hours PRN Agitation  oxyCODONE    IR 10 milliGRAM(s) Oral every 3 hours PRN Moderate Pain (4 - 6)  oxyCODONE    IR 5 milliGRAM(s) Oral every 3 hours PRN Mild Pain (1 - 3)      PHYSICAL EXAM: SEE BELOW                          11.3   22.96 )-----------( 283      ( 12 Oct 2022 05:22 )             34.8       1012    142  |  113<H>  |  23  ----------------------------<  149<H>  4.5   |  21<L>  |  1.04    Ca    8.5      12 Oct 2022 05:22  Phos  2.9     1012  Mg     1.6     10-12    TPro  6.2  /  Alb  2.9<L>  /  TBili  0.3  /  DBili  x   /  AST  141<H>  /  ALT  90<H>  /  AlkPhos  32<L>  10-12

## 2022-10-12 NOTE — PROVIDER CONTACT NOTE (EICU) - SITUATION
66 y/o F with PMHx type 2 DM, HTN, and HLD who presented to  for elective laminectomy L4/L5 lumbar fusion. Post procedure, pt developed worsening lethargy. An ABG was obtained which revealed severe respiratory acidosis (6.97/113/100/26). Pt was urgently intubated by anesthesia. Admitted to ICU for continuation of care.  Case discussed with the ICU PA.  Clinically improved post intubation.

## 2022-10-12 NOTE — PHYSICAL THERAPY INITIAL EVALUATION ADULT - GENERAL OBSERVATIONS, REHAB EVAL
pt rec'd supine in bed in ICY, lines, monitors, SCds, louie, O2. +hives-nsg aware, pt reports she "gets this". no LSO in room (pt reports brought to hosp pre-op) BP 93/47.

## 2022-10-13 LAB
ANION GAP SERPL CALC-SCNC: 5 MMOL/L — SIGNIFICANT CHANGE UP (ref 5–17)
BUN SERPL-MCNC: 13 MG/DL — SIGNIFICANT CHANGE UP (ref 7–23)
CALCIUM SERPL-MCNC: 8.5 MG/DL — SIGNIFICANT CHANGE UP (ref 8.5–10.1)
CHLORIDE SERPL-SCNC: 111 MMOL/L — HIGH (ref 96–108)
CO2 SERPL-SCNC: 26 MMOL/L — SIGNIFICANT CHANGE UP (ref 22–31)
CREAT SERPL-MCNC: 0.85 MG/DL — SIGNIFICANT CHANGE UP (ref 0.5–1.3)
EGFR: 75 ML/MIN/1.73M2 — SIGNIFICANT CHANGE UP
GLUCOSE SERPL-MCNC: 116 MG/DL — HIGH (ref 70–99)
HCT VFR BLD CALC: 30.3 % — LOW (ref 34.5–45)
HGB BLD-MCNC: 9.6 G/DL — LOW (ref 11.5–15.5)
MAGNESIUM SERPL-MCNC: 1.9 MG/DL — SIGNIFICANT CHANGE UP (ref 1.6–2.6)
MCHC RBC-ENTMCNC: 28.6 PG — SIGNIFICANT CHANGE UP (ref 27–34)
MCHC RBC-ENTMCNC: 31.7 GM/DL — LOW (ref 32–36)
MCV RBC AUTO: 90.2 FL — SIGNIFICANT CHANGE UP (ref 80–100)
PHOSPHATE SERPL-MCNC: 1.8 MG/DL — LOW (ref 2.5–4.5)
PLATELET # BLD AUTO: 174 K/UL — SIGNIFICANT CHANGE UP (ref 150–400)
POTASSIUM SERPL-MCNC: 4.1 MMOL/L — SIGNIFICANT CHANGE UP (ref 3.5–5.3)
POTASSIUM SERPL-SCNC: 4.1 MMOL/L — SIGNIFICANT CHANGE UP (ref 3.5–5.3)
RBC # BLD: 3.36 M/UL — LOW (ref 3.8–5.2)
RBC # FLD: 15.5 % — HIGH (ref 10.3–14.5)
SODIUM SERPL-SCNC: 142 MMOL/L — SIGNIFICANT CHANGE UP (ref 135–145)
WBC # BLD: 10.79 K/UL — HIGH (ref 3.8–10.5)
WBC # FLD AUTO: 10.79 K/UL — HIGH (ref 3.8–10.5)

## 2022-10-13 PROCEDURE — 99233 SBSQ HOSP IP/OBS HIGH 50: CPT

## 2022-10-13 RX ORDER — DIPHENHYDRAMINE HCL 50 MG
50 CAPSULE ORAL EVERY 4 HOURS
Refills: 0 | Status: DISCONTINUED | OUTPATIENT
Start: 2022-10-13 | End: 2022-10-17

## 2022-10-13 RX ORDER — SODIUM,POTASSIUM PHOSPHATES 278-250MG
1 POWDER IN PACKET (EA) ORAL EVERY 4 HOURS
Refills: 0 | Status: COMPLETED | OUTPATIENT
Start: 2022-10-13 | End: 2022-10-14

## 2022-10-13 RX ADMIN — Medication 650 MILLIGRAM(S): at 00:16

## 2022-10-13 RX ADMIN — Medication 650 MILLIGRAM(S): at 18:05

## 2022-10-13 RX ADMIN — NYSTATIN CREAM 1 APPLICATION(S): 100000 CREAM TOPICAL at 11:00

## 2022-10-13 RX ADMIN — OXYCODONE HYDROCHLORIDE 5 MILLIGRAM(S): 5 TABLET ORAL at 05:39

## 2022-10-13 RX ADMIN — Medication 2: at 17:23

## 2022-10-13 RX ADMIN — SODIUM CHLORIDE 75 MILLILITER(S): 9 INJECTION INTRAMUSCULAR; INTRAVENOUS; SUBCUTANEOUS at 04:52

## 2022-10-13 RX ADMIN — Medication 1 PACKET(S): at 22:10

## 2022-10-13 RX ADMIN — Medication 1 PACKET(S): at 13:10

## 2022-10-13 RX ADMIN — Medication 650 MILLIGRAM(S): at 06:26

## 2022-10-13 RX ADMIN — Medication 25 MILLIGRAM(S): at 03:18

## 2022-10-13 RX ADMIN — Medication 650 MILLIGRAM(S): at 17:23

## 2022-10-13 RX ADMIN — HYDROMORPHONE HYDROCHLORIDE 1 MILLIGRAM(S): 2 INJECTION INTRAMUSCULAR; INTRAVENOUS; SUBCUTANEOUS at 10:00

## 2022-10-13 RX ADMIN — CYCLOBENZAPRINE HYDROCHLORIDE 10 MILLIGRAM(S): 10 TABLET, FILM COATED ORAL at 13:10

## 2022-10-13 RX ADMIN — GABAPENTIN 300 MILLIGRAM(S): 400 CAPSULE ORAL at 10:59

## 2022-10-13 RX ADMIN — Medication 100 MILLIGRAM(S): at 10:59

## 2022-10-13 RX ADMIN — GABAPENTIN 300 MILLIGRAM(S): 400 CAPSULE ORAL at 22:11

## 2022-10-13 RX ADMIN — OXYCODONE HYDROCHLORIDE 5 MILLIGRAM(S): 5 TABLET ORAL at 04:39

## 2022-10-13 RX ADMIN — Medication 145 MILLIGRAM(S): at 10:59

## 2022-10-13 RX ADMIN — Medication 2: at 22:09

## 2022-10-13 RX ADMIN — HYDROMORPHONE HYDROCHLORIDE 1 MILLIGRAM(S): 2 INJECTION INTRAMUSCULAR; INTRAVENOUS; SUBCUTANEOUS at 09:47

## 2022-10-13 RX ADMIN — CYCLOBENZAPRINE HYDROCHLORIDE 10 MILLIGRAM(S): 10 TABLET, FILM COATED ORAL at 22:11

## 2022-10-13 RX ADMIN — PANTOPRAZOLE SODIUM 40 MILLIGRAM(S): 20 TABLET, DELAYED RELEASE ORAL at 10:58

## 2022-10-13 RX ADMIN — CYCLOBENZAPRINE HYDROCHLORIDE 10 MILLIGRAM(S): 10 TABLET, FILM COATED ORAL at 05:26

## 2022-10-13 RX ADMIN — HYDROMORPHONE HYDROCHLORIDE 1 MILLIGRAM(S): 2 INJECTION INTRAMUSCULAR; INTRAVENOUS; SUBCUTANEOUS at 18:05

## 2022-10-13 RX ADMIN — Medication 650 MILLIGRAM(S): at 12:13

## 2022-10-13 RX ADMIN — Medication 1 APPLICATION(S): at 13:10

## 2022-10-13 RX ADMIN — NYSTATIN CREAM 1 APPLICATION(S): 100000 CREAM TOPICAL at 22:09

## 2022-10-13 RX ADMIN — ATORVASTATIN CALCIUM 40 MILLIGRAM(S): 80 TABLET, FILM COATED ORAL at 22:10

## 2022-10-13 RX ADMIN — HYDROMORPHONE HYDROCHLORIDE 1 MILLIGRAM(S): 2 INJECTION INTRAMUSCULAR; INTRAVENOUS; SUBCUTANEOUS at 17:30

## 2022-10-13 RX ADMIN — Medication 650 MILLIGRAM(S): at 11:56

## 2022-10-13 RX ADMIN — Medication 1 APPLICATION(S): at 22:10

## 2022-10-13 RX ADMIN — Medication 1 PACKET(S): at 17:23

## 2022-10-13 RX ADMIN — Medication 650 MILLIGRAM(S): at 05:26

## 2022-10-13 RX ADMIN — MIRTAZAPINE 15 MILLIGRAM(S): 45 TABLET, ORALLY DISINTEGRATING ORAL at 22:11

## 2022-10-13 RX ADMIN — SERTRALINE 50 MILLIGRAM(S): 25 TABLET, FILM COATED ORAL at 10:59

## 2022-10-13 RX ADMIN — Medication 5 MILLIGRAM(S): at 10:59

## 2022-10-13 NOTE — PROGRESS NOTE ADULT - ASSESSMENT
67F PMH HTN, HLD, DM2, obesity, L4-5 disc herniation with claudication    Underwent elective L4-5 lami with fusion 10/11  Post-op acute hypercapnic resp failure likely in the setting of anesthesia, obesity  Hypotension     Extubated 10/12  Off pressor 10/12      Plan:     Clinically stable   D/c IVF   Pain mx with Dilaudid, oxy  Restarted on home Remeron, Zoloft, Neurontin   PT, OOB  Has chronic itching, redness mostly involving hands, palms - recommend outpatient allergist rylie   D/c Vogt   Spine sx f/u     Likely transfer out of ICU today after d/w spine sx 67F PMH HTN, HLD, DM2, obesity, L4-5 disc herniation with claudication    Underwent elective L4-5 lami with fusion 10/11  Post-op acute hypercapnic resp failure likely in the setting of anesthesia, obesity  Hypotension     Extubated 10/12  Off pressor 10/12      Plan:     Clinically stable   D/c IVF   Pain mx with Dilaudid, oxy  Restarted on home Remeron, Zoloft, Neurontin   PT, OOB  Has chronic itching, redness mostly involving hands, palms - recommend outpatient allergist eval   Steroid cream (uses triamcinolone at home)  D/c Vogt   Spine sx f/u   Resume Ancef while drains in     Likely transfer out of ICU today after d/w spine sx 67F PMH HTN, HLD, DM2, obesity, L4-5 disc herniation with claudication    Underwent elective L4-5 lami with fusion 10/11  Post-op acute hypercapnic resp failure likely in the setting of anesthesia, obesity  Hypotension     Extubated 10/12  Off pressor 10/12      Plan:     Clinically stable   D/c IVF   Pain mx with Dilaudid, oxy  Restarted on home Remeron, Zoloft, Neurontin   PT, OOB  Has chronic itching, redness mostly involving hands, palms - recommend outpatient allergist eval   Steroid cream (uses triamcinolone at home), PRN Benadryl   D/c Vogt   Spine sx f/u     Transfer to SICU per ortho

## 2022-10-13 NOTE — PROGRESS NOTE ADULT - SUBJECTIVE AND OBJECTIVE BOX
Patient presents for definitive treatment of back and L leg pain. Patient several month history of back pain with left leg pain. Documented spondylolisthesis and spinal stenosis L4/5 but also L extraforaminal HNP L4/5 & L5/S1. Patient failed nonoperative modalities and underwent lumbar lami/fusion L4-S1, TLIF L4/5 & L5/S1 with Marilyn osteotomies, cages, LBG, BMP on 10/11/22.     10/13/22 Patient complaining of pruritic rash and incisional pain.    PAST MEDICAL & SURGICAL HISTORY:  Renal calculi      DM type 2 (diabetes mellitus, type 2)      HTN (hypertension)      Lower back pain      HLD (hyperlipidemia)      Anxiety and depression      At risk for obstructive sleep apnea  DHRUV precautions. Pt &gt;3 criteria on STOP-Bang questionnaire.      S/P       MEDICATIONS  (STANDING):  acetaminophen     Tablet .. 650 milliGRAM(s) Oral every 6 hours  atorvastatin 40 milliGRAM(s) Oral at bedtime  celecoxib 200 milliGRAM(s) Oral daily  cyclobenzaprine 10 milliGRAM(s) Oral every 8 hours  dextrose 5%. 1000 milliLiter(s) (100 mL/Hr) IV Continuous <Continuous>  dextrose 5%. 1000 milliLiter(s) (50 mL/Hr) IV Continuous <Continuous>  dextrose 50% Injectable 25 Gram(s) IV Push once  dextrose 50% Injectable 12.5 Gram(s) IV Push once  dextrose 50% Injectable 25 Gram(s) IV Push once  enalapril 5 milliGRAM(s) Oral daily  fenofibrate Tablet 145 milliGRAM(s) Oral daily  gabapentin 300 milliGRAM(s) Oral two times a day  glucagon  Injectable 1 milliGRAM(s) IntraMuscular once  insulin lispro (ADMELOG) corrective regimen sliding scale   SubCutaneous Before meals and at bedtime  metoprolol succinate  milliGRAM(s) Oral daily  mirtazapine 15 milliGRAM(s) Oral at bedtime  multivitamin 1 Tablet(s) Oral daily  nystatin Powder 1 Application(s) Topical two times a day  pantoprazole  Injectable 40 milliGRAM(s) IV Push daily  sertraline 50 milliGRAM(s) Oral daily  triamcinolone 0.1% Ointment 1 Application(s) Topical three times a day    MEDICATIONS  (PRN):  dextrose Oral Gel 15 Gram(s) Oral once PRN Blood Glucose LESS THAN 70 milliGRAM(s)/deciliter  diphenhydrAMINE Injectable 25 milliGRAM(s) IV Push every 4 hours PRN Rash and/or Itching  HYDROmorphone  Injectable 1 milliGRAM(s) IV Push every 3 hours PRN Severe Pain (7 - 10)  oxyCODONE    IR 10 milliGRAM(s) Oral every 3 hours PRN Moderate Pain (4 - 6)  oxyCODONE    IR 5 milliGRAM(s) Oral every 3 hours PRN Mild Pain (1 - 3)    Allergies    copper (Hives)  morphine (Other)  strawberry (Other (Moderate))    Intolerances    PE:    Vital Signs Last 24 Hrs  T(C): 36.9 (13 Oct 2022 08:00), Max: 37.4 (13 Oct 2022 04:00)  T(F): 98.4 (13 Oct 2022 08:00), Max: 99.3 (13 Oct 2022 04:00)  HR: 89 (13 Oct 2022 09:00) (74 - 97)  BP: 99/85 (13 Oct 2022 09:00) (85/48 - 144/51)  BP(mean): 91 (13 Oct 2022 09:00) (60 - 92)  RR: 15 (13 Oct 2022 09:00) (10 - 23)  SpO2: 92% (13 Oct 2022 09:00) (92% - 99%)    Parameters below as of 13 Oct 2022 09:00  Patient On (Oxygen Delivery Method): room air  O2 Flow (L/min): 2    Patient sitting upright in bed with c/o pruritic rash and incisional pain  Tolerating diet  Vogt with excellent U/O  (+) pruritis macular rash  Dressing dry and intact  Sup JAMSHID with persistent drainage  Deep JAMSHID drainage with moderate drainage  Neuro intact LEs    LABS                        9.6    10.79 )-----------( 174      ( 13 Oct 2022 05:35 )             30.3     10-13    142  |  111<H>  |  13  ----------------------------<  116<H>  4.1   |  26  |  0.85    Ca    8.5      13 Oct 2022 05:35  Phos  1.8     10-13  Mg     1.9     10-13    TPro  6.2  /  Alb  2.9<L>  /  TBili  0.3  /  DBili  x   /  AST  141<H>  /  ALT  90<H>  /  AlkPhos  32<L>  10-12   Patient presents for definitive treatment of back and L leg pain. Patient several month history of back pain with left leg pain. Documented spondylolisthesis and spinal stenosis L4/5 but also L extraforaminal HNP L4/5 & L5/S1. Patient failed nonoperative modalities and underwent lumbar lami/fusion L4-S1, TLIF L4/5 & L5/S1 with Marilyn osteotomies, cages, LBG, BMP on 10/11/22.     10/13/22 Patient complaining of pruritic rash and incisional pain.    PAST MEDICAL & SURGICAL HISTORY:  Renal calculi      DM type 2 (diabetes mellitus, type 2)      HTN (hypertension)      Lower back pain      HLD (hyperlipidemia)      Anxiety and depression      At risk for obstructive sleep apnea  DHRUV precautions. Pt &gt;3 criteria on STOP-Bang questionnaire.      S/P       MEDICATIONS  (STANDING):  acetaminophen     Tablet .. 650 milliGRAM(s) Oral every 6 hours  atorvastatin 40 milliGRAM(s) Oral at bedtime  celecoxib 200 milliGRAM(s) Oral daily  cyclobenzaprine 10 milliGRAM(s) Oral every 8 hours  dextrose 5%. 1000 milliLiter(s) (100 mL/Hr) IV Continuous <Continuous>  dextrose 5%. 1000 milliLiter(s) (50 mL/Hr) IV Continuous <Continuous>  dextrose 50% Injectable 25 Gram(s) IV Push once  dextrose 50% Injectable 12.5 Gram(s) IV Push once  dextrose 50% Injectable 25 Gram(s) IV Push once  enalapril 5 milliGRAM(s) Oral daily  fenofibrate Tablet 145 milliGRAM(s) Oral daily  gabapentin 300 milliGRAM(s) Oral two times a day  glucagon  Injectable 1 milliGRAM(s) IntraMuscular once  insulin lispro (ADMELOG) corrective regimen sliding scale   SubCutaneous Before meals and at bedtime  metoprolol succinate  milliGRAM(s) Oral daily  mirtazapine 15 milliGRAM(s) Oral at bedtime  multivitamin 1 Tablet(s) Oral daily  nystatin Powder 1 Application(s) Topical two times a day  pantoprazole  Injectable 40 milliGRAM(s) IV Push daily  sertraline 50 milliGRAM(s) Oral daily  triamcinolone 0.1% Ointment 1 Application(s) Topical three times a day    MEDICATIONS  (PRN):  dextrose Oral Gel 15 Gram(s) Oral once PRN Blood Glucose LESS THAN 70 milliGRAM(s)/deciliter  diphenhydrAMINE Injectable 25 milliGRAM(s) IV Push every 4 hours PRN Rash and/or Itching  HYDROmorphone  Injectable 1 milliGRAM(s) IV Push every 3 hours PRN Severe Pain (7 - 10)  oxyCODONE    IR 10 milliGRAM(s) Oral every 3 hours PRN Moderate Pain (4 - 6)  oxyCODONE    IR 5 milliGRAM(s) Oral every 3 hours PRN Mild Pain (1 - 3)    Allergies    copper (Hives)  morphine (Other)  strawberry (Other (Moderate))    Intolerances    PE:    Vital Signs Last 24 Hrs  T(C): 36.9 (13 Oct 2022 08:00), Max: 37.4 (13 Oct 2022 04:00)  T(F): 98.4 (13 Oct 2022 08:00), Max: 99.3 (13 Oct 2022 04:00)  HR: 89 (13 Oct 2022 09:00) (74 - 97)  BP: 99/85 (13 Oct 2022 09:00) (85/48 - 144/51)  BP(mean): 91 (13 Oct 2022 09:00) (60 - 92)  RR: 15 (13 Oct 2022 09:00) (10 - 23)  SpO2: 92% (13 Oct 2022 09:00) (92% - 99%)    Parameters below as of 13 Oct 2022 09:00  Patient On (Oxygen Delivery Method): room air  O2 Flow (L/min): 2    Patient sitting upright in bed with c/o pruritic rash and incisional pain  Tolerating diet  Vogt with excellent U/O  (+) pruritis macular rash  Sup JAMSHID with persistent drainage  Deep JAMSHID drainage with moderate drainage  Neuro intact LEs    LABS                        9.6    10.79 )-----------( 174      ( 13 Oct 2022 05:35 )             30.3     10-13    142  |  111<H>  |  13  ----------------------------<  116<H>  4.1   |  26  |  0.85    Ca    8.5      13 Oct 2022 05:35  Phos  1.8     10-13  Mg     1.9     10-13    TPro  6.2  /  Alb  2.9<L>  /  TBili  0.3  /  DBili  x   /  AST  141<H>  /  ALT  90<H>  /  AlkPhos  32<L>  10-12

## 2022-10-13 NOTE — PROGRESS NOTE ADULT - RESPIRATORY
clear to auscultation bilaterally/no wheezes/no respiratory distress/no use of accessory muscles/breath sounds equal
no wheezes/no rales/no respiratory distress/breath sounds equal

## 2022-10-13 NOTE — PROGRESS NOTE ADULT - ASSESSMENT
Patient presents for definitive treatment of back and L leg pain. Patient several month history of back pain with left leg pain. Documented spondylolisthesis and spinal stenosis L4/5 but also L extraforaminal HNP L4/5 & L5/S1. Patient failed nonoperative modalities and underwent lumbar lami/fusion L4-S1, TLIF L4/5 & L5/S1 with Marilyn osteotomies, cages, LBG, BMP on 10/11/22.     POD#2  D/C PCA  Start oral analgesia  D/C Celebrex in case unknown sulfa allergy  Start OOB/PT with brace  Monitor JPs  Monitor labs  Continue po Benadryl prn  Transfer to SICU when cleared by Intensivist.

## 2022-10-13 NOTE — PROGRESS NOTE ADULT - SUBJECTIVE AND OBJECTIVE BOX
Patient is a 67y old  Female who presents with a chief complaint of Acute hypercapnic respiratory failure (12 Oct 2022 08:32)    24 hour events:     PAST MEDICAL & SURGICAL HISTORY:  Renal calculi      DM type 2 (diabetes mellitus, type 2)      HTN (hypertension)      Lower back pain      HLD (hyperlipidemia)      Anxiety and depression      At risk for obstructive sleep apnea  DHRUV precautions. Pt &gt;3 criteria on STOP-Bang questionnaire.      S/P           Allergies    copper (Hives)  morphine (Other)  strawberry (Other (Moderate))    Intolerances      REVIEW OF SYSTEMS: SEE BELOW       ICU Vital Signs Last 24 Hrs  T(C): 37.4 (13 Oct 2022 04:00), Max: 37.4 (13 Oct 2022 04:00)  T(F): 99.3 (13 Oct 2022 04:00), Max: 99.3 (13 Oct 2022 04:00)  HR: 93 (13 Oct 2022 07:00) (60 - 97)  BP: 144/51 (13 Oct 2022 07:00) (85/48 - 144/51)  BP(mean): 75 (13 Oct 2022 07:00) (60 - 118)  ABP: --  ABP(mean): --  RR: 21 (13 Oct 2022 07:00) (10 - 23)  SpO2: 96% (13 Oct 2022 07:00) (92% - 99%)    O2 Parameters below as of 13 Oct 2022 07:00  Patient On (Oxygen Delivery Method): nasal cannula  O2 Flow (L/min): 2          CAPILLARY BLOOD GLUCOSE      POCT Blood Glucose.: 124 mg/dL (13 Oct 2022 08:00)  POCT Blood Glucose.: 156 mg/dL (12 Oct 2022 22:46)  POCT Blood Glucose.: 169 mg/dL (12 Oct 2022 18:25)  POCT Blood Glucose.: 144 mg/dL (12 Oct 2022 12:32)      I&O's Summary    12 Oct 2022 07:01  -  13 Oct 2022 07:00  --------------------------------------------------------  IN: 2298 mL / OUT: 2955 mL / NET: -657 mL        Mode: CPAP with PS  FiO2: 30  PEEP: 5  PS: 5  PIP: 12      MEDICATIONS  (STANDING):  acetaminophen     Tablet .. 650 milliGRAM(s) Oral every 6 hours  atorvastatin 40 milliGRAM(s) Oral at bedtime  celecoxib 200 milliGRAM(s) Oral daily  cyclobenzaprine 10 milliGRAM(s) Oral every 8 hours  dextrose 5%. 1000 milliLiter(s) (100 mL/Hr) IV Continuous <Continuous>  dextrose 5%. 1000 milliLiter(s) (50 mL/Hr) IV Continuous <Continuous>  dextrose 50% Injectable 25 Gram(s) IV Push once  dextrose 50% Injectable 12.5 Gram(s) IV Push once  dextrose 50% Injectable 25 Gram(s) IV Push once  enalapril 5 milliGRAM(s) Oral daily  fenofibrate Tablet 145 milliGRAM(s) Oral daily  gabapentin 300 milliGRAM(s) Oral two times a day  glucagon  Injectable 1 milliGRAM(s) IntraMuscular once  insulin lispro (ADMELOG) corrective regimen sliding scale   SubCutaneous Before meals and at bedtime  metoprolol succinate  milliGRAM(s) Oral daily  mirtazapine 15 milliGRAM(s) Oral at bedtime  multivitamin 1 Tablet(s) Oral daily  nystatin Powder 1 Application(s) Topical two times a day  pantoprazole  Injectable 40 milliGRAM(s) IV Push daily  sertraline 50 milliGRAM(s) Oral daily  sodium chloride 0.9%. 1000 milliLiter(s) (75 mL/Hr) IV Continuous <Continuous>  tranexamic acid Infusion 1 mG/kG/Hr (51 mL/Hr) IV Continuous <Continuous>  tranexamic acid IVPB 1000 milliGRAM(s) IV Intermittent once      MEDICATIONS  (PRN):  ALPRAZolam 0.5 milliGRAM(s) Oral every 8 hours PRN anxiety  dextrose Oral Gel 15 Gram(s) Oral once PRN Blood Glucose LESS THAN 70 milliGRAM(s)/deciliter  diphenhydrAMINE Injectable 25 milliGRAM(s) IV Push every 4 hours PRN Rash and/or Itching  HYDROmorphone  Injectable 1 milliGRAM(s) IV Push every 3 hours PRN Severe Pain (7 - 10)  oxyCODONE    IR 10 milliGRAM(s) Oral every 3 hours PRN Moderate Pain (4 - 6)  oxyCODONE    IR 5 milliGRAM(s) Oral every 3 hours PRN Mild Pain (1 - 3)      PHYSICAL EXAM: SEE BELOW                          9.6    10.79 )-----------( 174      ( 13 Oct 2022 05:35 )             30.3       10-13    142  |  111<H>  |  13  ----------------------------<  116<H>  4.1   |  26  |  0.85    Ca    8.5      13 Oct 2022 05:35  Phos  1.8     10-13  Mg     1.9     10-13    TPro  6.2  /  Alb  2.9<L>  /  TBili  0.3  /  DBili  x   /  AST  141<H>  /  ALT  90<H>  /  AlkPhos  32<L>  10-12                       Patient is a 67y old  Female who presents with a chief complaint of Acute hypercapnic respiratory failure (12 Oct 2022 08:32)    24 hour events: extubated 10/12   c/o intermittent itching on hands    PAST MEDICAL & SURGICAL HISTORY:  Renal calculi      DM type 2 (diabetes mellitus, type 2)      HTN (hypertension)      Lower back pain      HLD (hyperlipidemia)      Anxiety and depression      At risk for obstructive sleep apnea  DHRUV precautions. Pt &gt;3 criteria on STOP-Bang questionnaire.      S/P           Allergies    copper (Hives)  morphine (Other)  strawberry (Other (Moderate))    Intolerances      REVIEW OF SYSTEMS: SEE BELOW       ICU Vital Signs Last 24 Hrs  T(C): 37.4 (13 Oct 2022 04:00), Max: 37.4 (13 Oct 2022 04:00)  T(F): 99.3 (13 Oct 2022 04:00), Max: 99.3 (13 Oct 2022 04:00)  HR: 93 (13 Oct 2022 07:00) (60 - 97)  BP: 144/51 (13 Oct 2022 07:00) (85/48 - 144/51)  BP(mean): 75 (13 Oct 2022 07:00) (60 - 118)  ABP: --  ABP(mean): --  RR: 21 (13 Oct 2022 07:00) (10 - 23)  SpO2: 96% (13 Oct 2022 07:00) (92% - 99%)    O2 Parameters below as of 13 Oct 2022 07:00  Patient On (Oxygen Delivery Method): nasal cannula  O2 Flow (L/min): 2          CAPILLARY BLOOD GLUCOSE      POCT Blood Glucose.: 124 mg/dL (13 Oct 2022 08:00)  POCT Blood Glucose.: 156 mg/dL (12 Oct 2022 22:46)  POCT Blood Glucose.: 169 mg/dL (12 Oct 2022 18:25)  POCT Blood Glucose.: 144 mg/dL (12 Oct 2022 12:32)      I&O's Summary    12 Oct 2022 07:01  -  13 Oct 2022 07:00  --------------------------------------------------------  IN: 2298 mL / OUT: 2955 mL / NET: -657 mL        Mode: CPAP with PS  FiO2: 30  PEEP: 5  PS: 5  PIP: 12      MEDICATIONS  (STANDING):  acetaminophen     Tablet .. 650 milliGRAM(s) Oral every 6 hours  atorvastatin 40 milliGRAM(s) Oral at bedtime  celecoxib 200 milliGRAM(s) Oral daily  cyclobenzaprine 10 milliGRAM(s) Oral every 8 hours  dextrose 5%. 1000 milliLiter(s) (100 mL/Hr) IV Continuous <Continuous>  dextrose 5%. 1000 milliLiter(s) (50 mL/Hr) IV Continuous <Continuous>  dextrose 50% Injectable 25 Gram(s) IV Push once  dextrose 50% Injectable 12.5 Gram(s) IV Push once  dextrose 50% Injectable 25 Gram(s) IV Push once  enalapril 5 milliGRAM(s) Oral daily  fenofibrate Tablet 145 milliGRAM(s) Oral daily  gabapentin 300 milliGRAM(s) Oral two times a day  glucagon  Injectable 1 milliGRAM(s) IntraMuscular once  insulin lispro (ADMELOG) corrective regimen sliding scale   SubCutaneous Before meals and at bedtime  metoprolol succinate  milliGRAM(s) Oral daily  mirtazapine 15 milliGRAM(s) Oral at bedtime  multivitamin 1 Tablet(s) Oral daily  nystatin Powder 1 Application(s) Topical two times a day  pantoprazole  Injectable 40 milliGRAM(s) IV Push daily  sertraline 50 milliGRAM(s) Oral daily  sodium chloride 0.9%. 1000 milliLiter(s) (75 mL/Hr) IV Continuous <Continuous>  tranexamic acid Infusion 1 mG/kG/Hr (51 mL/Hr) IV Continuous <Continuous>  tranexamic acid IVPB 1000 milliGRAM(s) IV Intermittent once      MEDICATIONS  (PRN):  ALPRAZolam 0.5 milliGRAM(s) Oral every 8 hours PRN anxiety  dextrose Oral Gel 15 Gram(s) Oral once PRN Blood Glucose LESS THAN 70 milliGRAM(s)/deciliter  diphenhydrAMINE Injectable 25 milliGRAM(s) IV Push every 4 hours PRN Rash and/or Itching  HYDROmorphone  Injectable 1 milliGRAM(s) IV Push every 3 hours PRN Severe Pain (7 - 10)  oxyCODONE    IR 10 milliGRAM(s) Oral every 3 hours PRN Moderate Pain (4 - 6)  oxyCODONE    IR 5 milliGRAM(s) Oral every 3 hours PRN Mild Pain (1 - 3)      PHYSICAL EXAM: SEE BELOW                          9.6    10.79 )-----------( 174      ( 13 Oct 2022 05:35 )             30.3       10-13    142  |  111<H>  |  13  ----------------------------<  116<H>  4.1   |  26  |  0.85    Ca    8.5      13 Oct 2022 05:35  Phos  1.8     10-13  Mg     1.9     10-13    TPro  6.2  /  Alb  2.9<L>  /  TBili  0.3  /  DBili  x   /  AST  141<H>  /  ALT  90<H>  /  AlkPhos  32<L>  10-12

## 2022-10-14 LAB
ANION GAP SERPL CALC-SCNC: 6 MMOL/L — SIGNIFICANT CHANGE UP (ref 5–17)
BUN SERPL-MCNC: 10 MG/DL — SIGNIFICANT CHANGE UP (ref 7–23)
CALCIUM SERPL-MCNC: 9 MG/DL — SIGNIFICANT CHANGE UP (ref 8.5–10.1)
CHLORIDE SERPL-SCNC: 107 MMOL/L — SIGNIFICANT CHANGE UP (ref 96–108)
CO2 SERPL-SCNC: 29 MMOL/L — SIGNIFICANT CHANGE UP (ref 22–31)
CREAT SERPL-MCNC: 0.76 MG/DL — SIGNIFICANT CHANGE UP (ref 0.5–1.3)
EGFR: 85 ML/MIN/1.73M2 — SIGNIFICANT CHANGE UP
GLUCOSE SERPL-MCNC: 124 MG/DL — HIGH (ref 70–99)
HCT VFR BLD CALC: 30.5 % — LOW (ref 34.5–45)
HGB BLD-MCNC: 9.6 G/DL — LOW (ref 11.5–15.5)
MAGNESIUM SERPL-MCNC: 1.9 MG/DL — SIGNIFICANT CHANGE UP (ref 1.6–2.6)
MCHC RBC-ENTMCNC: 28.3 PG — SIGNIFICANT CHANGE UP (ref 27–34)
MCHC RBC-ENTMCNC: 31.5 GM/DL — LOW (ref 32–36)
MCV RBC AUTO: 90 FL — SIGNIFICANT CHANGE UP (ref 80–100)
PHOSPHATE SERPL-MCNC: 2.2 MG/DL — LOW (ref 2.5–4.5)
PLATELET # BLD AUTO: 179 K/UL — SIGNIFICANT CHANGE UP (ref 150–400)
POTASSIUM SERPL-MCNC: 4.1 MMOL/L — SIGNIFICANT CHANGE UP (ref 3.5–5.3)
POTASSIUM SERPL-SCNC: 4.1 MMOL/L — SIGNIFICANT CHANGE UP (ref 3.5–5.3)
RBC # BLD: 3.39 M/UL — LOW (ref 3.8–5.2)
RBC # FLD: 15.2 % — HIGH (ref 10.3–14.5)
SODIUM SERPL-SCNC: 142 MMOL/L — SIGNIFICANT CHANGE UP (ref 135–145)
WBC # BLD: 11.26 K/UL — HIGH (ref 3.8–10.5)
WBC # FLD AUTO: 11.26 K/UL — HIGH (ref 3.8–10.5)

## 2022-10-14 RX ORDER — SERTRALINE 25 MG/1
200 TABLET, FILM COATED ORAL ONCE
Refills: 0 | Status: COMPLETED | OUTPATIENT
Start: 2022-10-14 | End: 2022-10-14

## 2022-10-14 RX ADMIN — HYDROMORPHONE HYDROCHLORIDE 1 MILLIGRAM(S): 2 INJECTION INTRAMUSCULAR; INTRAVENOUS; SUBCUTANEOUS at 04:22

## 2022-10-14 RX ADMIN — Medication 5 MILLIGRAM(S): at 13:24

## 2022-10-14 RX ADMIN — Medication 1 APPLICATION(S): at 05:21

## 2022-10-14 RX ADMIN — GABAPENTIN 300 MILLIGRAM(S): 400 CAPSULE ORAL at 10:50

## 2022-10-14 RX ADMIN — CYCLOBENZAPRINE HYDROCHLORIDE 10 MILLIGRAM(S): 10 TABLET, FILM COATED ORAL at 13:30

## 2022-10-14 RX ADMIN — Medication 1 TABLET(S): at 10:49

## 2022-10-14 RX ADMIN — OXYCODONE HYDROCHLORIDE 10 MILLIGRAM(S): 5 TABLET ORAL at 19:04

## 2022-10-14 RX ADMIN — Medication 650 MILLIGRAM(S): at 13:30

## 2022-10-14 RX ADMIN — NYSTATIN CREAM 1 APPLICATION(S): 100000 CREAM TOPICAL at 21:17

## 2022-10-14 RX ADMIN — HYDROMORPHONE HYDROCHLORIDE 1 MILLIGRAM(S): 2 INJECTION INTRAMUSCULAR; INTRAVENOUS; SUBCUTANEOUS at 13:15

## 2022-10-14 RX ADMIN — ATORVASTATIN CALCIUM 40 MILLIGRAM(S): 80 TABLET, FILM COATED ORAL at 21:17

## 2022-10-14 RX ADMIN — Medication 1 APPLICATION(S): at 21:17

## 2022-10-14 RX ADMIN — Medication 650 MILLIGRAM(S): at 20:01

## 2022-10-14 RX ADMIN — SERTRALINE 200 MILLIGRAM(S): 25 TABLET, FILM COATED ORAL at 12:56

## 2022-10-14 RX ADMIN — HYDROMORPHONE HYDROCHLORIDE 1 MILLIGRAM(S): 2 INJECTION INTRAMUSCULAR; INTRAVENOUS; SUBCUTANEOUS at 12:56

## 2022-10-14 RX ADMIN — OXYCODONE HYDROCHLORIDE 10 MILLIGRAM(S): 5 TABLET ORAL at 20:02

## 2022-10-14 RX ADMIN — Medication 85 MILLIMOLE(S): at 10:49

## 2022-10-14 RX ADMIN — Medication 4: at 21:16

## 2022-10-14 RX ADMIN — Medication 650 MILLIGRAM(S): at 19:05

## 2022-10-14 RX ADMIN — Medication 650 MILLIGRAM(S): at 00:19

## 2022-10-14 RX ADMIN — Medication 100 MILLIGRAM(S): at 10:50

## 2022-10-14 RX ADMIN — Medication 145 MILLIGRAM(S): at 10:51

## 2022-10-14 RX ADMIN — HYDROMORPHONE HYDROCHLORIDE 1 MILLIGRAM(S): 2 INJECTION INTRAMUSCULAR; INTRAVENOUS; SUBCUTANEOUS at 04:52

## 2022-10-14 RX ADMIN — CYCLOBENZAPRINE HYDROCHLORIDE 10 MILLIGRAM(S): 10 TABLET, FILM COATED ORAL at 05:21

## 2022-10-14 RX ADMIN — Medication 650 MILLIGRAM(S): at 01:19

## 2022-10-14 RX ADMIN — Medication 1 APPLICATION(S): at 13:31

## 2022-10-14 RX ADMIN — Medication 650 MILLIGRAM(S): at 05:21

## 2022-10-14 RX ADMIN — MIRTAZAPINE 15 MILLIGRAM(S): 45 TABLET, ORALLY DISINTEGRATING ORAL at 21:17

## 2022-10-14 RX ADMIN — CYCLOBENZAPRINE HYDROCHLORIDE 10 MILLIGRAM(S): 10 TABLET, FILM COATED ORAL at 21:16

## 2022-10-14 RX ADMIN — Medication 2: at 13:32

## 2022-10-14 RX ADMIN — Medication 1 PACKET(S): at 02:16

## 2022-10-14 RX ADMIN — NYSTATIN CREAM 1 APPLICATION(S): 100000 CREAM TOPICAL at 13:31

## 2022-10-14 RX ADMIN — SERTRALINE 50 MILLIGRAM(S): 25 TABLET, FILM COATED ORAL at 10:50

## 2022-10-14 RX ADMIN — Medication 650 MILLIGRAM(S): at 15:00

## 2022-10-14 RX ADMIN — Medication 650 MILLIGRAM(S): at 05:51

## 2022-10-14 RX ADMIN — GABAPENTIN 300 MILLIGRAM(S): 400 CAPSULE ORAL at 21:14

## 2022-10-14 NOTE — PROGRESS NOTE ADULT - SUBJECTIVE AND OBJECTIVE BOX
Patient presents for definitive treatment of back and L leg pain. Patient several month history of back pain with left leg pain. Documented spondylolisthesis and spinal stenosis L4/5 but also L extraforaminal HNP L4/5 & L5/S1. Patient failed nonoperative modalities and underwent lumbar lami/fusion L4-S1, TLIF L4/5 & L5/S1 with Marilyn osteotomies, cages, LBG, BMP on 10/11/22.     10/13/22 Patient complaining of pruritic rash and incisional pain.  10/14/22 Patient with incisional pain    PAST MEDICAL & SURGICAL HISTORY:  Renal calculi      DM type 2 (diabetes mellitus, type 2)      HTN (hypertension)      Lower back pain      HLD (hyperlipidemia)      Anxiety and depression      At risk for obstructive sleep apnea  DHRUV precautions. Pt &gt;3 criteria on STOP-Bang questionnaire.      S/P       MEDICATIONS  (STANDING):  acetaminophen     Tablet .. 650 milliGRAM(s) Oral every 6 hours  atorvastatin 40 milliGRAM(s) Oral at bedtime  celecoxib 200 milliGRAM(s) Oral daily  cyclobenzaprine 10 milliGRAM(s) Oral every 8 hours  dextrose 5%. 1000 milliLiter(s) (100 mL/Hr) IV Continuous <Continuous>  dextrose 5%. 1000 milliLiter(s) (50 mL/Hr) IV Continuous <Continuous>  dextrose 50% Injectable 25 Gram(s) IV Push once  dextrose 50% Injectable 12.5 Gram(s) IV Push once  dextrose 50% Injectable 25 Gram(s) IV Push once  enalapril 5 milliGRAM(s) Oral daily  fenofibrate Tablet 145 milliGRAM(s) Oral daily  gabapentin 300 milliGRAM(s) Oral two times a day  glucagon  Injectable 1 milliGRAM(s) IntraMuscular once  insulin lispro (ADMELOG) corrective regimen sliding scale   SubCutaneous Before meals and at bedtime  metoprolol succinate  milliGRAM(s) Oral daily  mirtazapine 15 milliGRAM(s) Oral at bedtime  multivitamin 1 Tablet(s) Oral daily  nystatin Powder 1 Application(s) Topical two times a day  pantoprazole  Injectable 40 milliGRAM(s) IV Push daily  sertraline 50 milliGRAM(s) Oral daily  triamcinolone 0.1% Ointment 1 Application(s) Topical three times a day    MEDICATIONS  (PRN):  dextrose Oral Gel 15 Gram(s) Oral once PRN Blood Glucose LESS THAN 70 milliGRAM(s)/deciliter  diphenhydrAMINE Injectable 25 milliGRAM(s) IV Push every 4 hours PRN Rash and/or Itching  HYDROmorphone  Injectable 1 milliGRAM(s) IV Push every 3 hours PRN Severe Pain (7 - 10)  oxyCODONE    IR 10 milliGRAM(s) Oral every 3 hours PRN Moderate Pain (4 - 6)  oxyCODONE    IR 5 milliGRAM(s) Oral every 3 hours PRN Mild Pain (1 - 3)    Allergies    copper (Hives)  morphine (Other)  strawberry (Other (Moderate))    Intolerances    PE:    Vital Signs Last 24 Hrs  T(C): 36.1 (14 Oct 2022 08:00), Max: 37.5 (14 Oct 2022 00:00)  T(F): 97 (14 Oct 2022 08:00), Max: 99.5 (14 Oct 2022 00:00)  HR: 74 (14 Oct 2022 12:00) (70 - 92)  BP: 133/61 (14 Oct 2022 12:00) (114/78 - 150/57)  BP(mean): 82 (14 Oct 2022 12:00) (70 - 98)  RR: 14 (14 Oct 2022 12:00) (11 - 20)  SpO2: 96% (14 Oct 2022 12:00) (90% - 99%)    Parameters below as of 14 Oct 2022 12:00  Patient On (Oxygen Delivery Method): room air    Patient sitting in bed with c/o incisional pain, rash better  Tolerating diet  Excellent U/ORersolving) pruritis macular rash  Dressing with old bloody drainage-changed  Incision clean and dry  Sup JAMSHID with minimal drainage-removed  Deep JAMSHID drainage with moderate drainage  Neuro intact LEs    LABS                                   9.6    11.26 )-----------( 179      ( 14 Oct 2022 05:18 )             30.5     10-14    142  |  107  |  10  ----------------------------<  124<H>  4.1   |  29  |  0.76    Ca    9.0      14 Oct 2022 05:18  Phos  2.2     10-14  Mg     1.9     10-14

## 2022-10-14 NOTE — PROGRESS NOTE ADULT - ASSESSMENT
Patient presents for definitive treatment of back and L leg pain. Patient several month history of back pain with left leg pain. Documented spondylolisthesis and spinal stenosis L4/5 but also L extraforaminal HNP L4/5 & L5/S1. Patient failed nonoperative modalities and underwent lumbar lami/fusion L4-S1, TLIF L4/5 & L5/S1 with Marilyn osteotomies, cages, LBG, BMP on 10/11/22.     POD#3  Continue analgesia  D/C'd Celebrex in case unknown sulfa allergy  Cont OOB/PT with brace  Monitor JPs  Monitor labs  Continue po Benadryl prn  Transfer to 2N

## 2022-10-15 LAB
ANION GAP SERPL CALC-SCNC: 5 MMOL/L — SIGNIFICANT CHANGE UP (ref 5–17)
BUN SERPL-MCNC: 12 MG/DL — SIGNIFICANT CHANGE UP (ref 7–23)
CALCIUM SERPL-MCNC: 9 MG/DL — SIGNIFICANT CHANGE UP (ref 8.5–10.1)
CHLORIDE SERPL-SCNC: 104 MMOL/L — SIGNIFICANT CHANGE UP (ref 96–108)
CO2 SERPL-SCNC: 31 MMOL/L — SIGNIFICANT CHANGE UP (ref 22–31)
CREAT SERPL-MCNC: 0.67 MG/DL — SIGNIFICANT CHANGE UP (ref 0.5–1.3)
EGFR: 95 ML/MIN/1.73M2 — SIGNIFICANT CHANGE UP
GLUCOSE SERPL-MCNC: 158 MG/DL — HIGH (ref 70–99)
HCT VFR BLD CALC: 29.4 % — LOW (ref 34.5–45)
HGB BLD-MCNC: 9.4 G/DL — LOW (ref 11.5–15.5)
MAGNESIUM SERPL-MCNC: 1.8 MG/DL — SIGNIFICANT CHANGE UP (ref 1.6–2.6)
MCHC RBC-ENTMCNC: 28.7 PG — SIGNIFICANT CHANGE UP (ref 27–34)
MCHC RBC-ENTMCNC: 32 GM/DL — SIGNIFICANT CHANGE UP (ref 32–36)
MCV RBC AUTO: 89.6 FL — SIGNIFICANT CHANGE UP (ref 80–100)
PHOSPHATE SERPL-MCNC: 2.2 MG/DL — LOW (ref 2.5–4.5)
PLATELET # BLD AUTO: 192 K/UL — SIGNIFICANT CHANGE UP (ref 150–400)
POTASSIUM SERPL-MCNC: 3.6 MMOL/L — SIGNIFICANT CHANGE UP (ref 3.5–5.3)
POTASSIUM SERPL-SCNC: 3.6 MMOL/L — SIGNIFICANT CHANGE UP (ref 3.5–5.3)
RBC # BLD: 3.28 M/UL — LOW (ref 3.8–5.2)
RBC # FLD: 14.8 % — HIGH (ref 10.3–14.5)
SODIUM SERPL-SCNC: 140 MMOL/L — SIGNIFICANT CHANGE UP (ref 135–145)
WBC # BLD: 10.06 K/UL — SIGNIFICANT CHANGE UP (ref 3.8–10.5)
WBC # FLD AUTO: 10.06 K/UL — SIGNIFICANT CHANGE UP (ref 3.8–10.5)

## 2022-10-15 RX ADMIN — CYCLOBENZAPRINE HYDROCHLORIDE 10 MILLIGRAM(S): 10 TABLET, FILM COATED ORAL at 05:44

## 2022-10-15 RX ADMIN — NYSTATIN CREAM 1 APPLICATION(S): 100000 CREAM TOPICAL at 10:50

## 2022-10-15 RX ADMIN — Medication 1 APPLICATION(S): at 05:46

## 2022-10-15 RX ADMIN — Medication 8: at 21:25

## 2022-10-15 RX ADMIN — Medication 5 MILLIGRAM(S): at 10:47

## 2022-10-15 RX ADMIN — OXYCODONE HYDROCHLORIDE 5 MILLIGRAM(S): 5 TABLET ORAL at 04:00

## 2022-10-15 RX ADMIN — Medication 145 MILLIGRAM(S): at 10:47

## 2022-10-15 RX ADMIN — Medication 2: at 11:41

## 2022-10-15 RX ADMIN — Medication 100 MILLIGRAM(S): at 10:47

## 2022-10-15 RX ADMIN — Medication 650 MILLIGRAM(S): at 06:15

## 2022-10-15 RX ADMIN — HYDROMORPHONE HYDROCHLORIDE 1 MILLIGRAM(S): 2 INJECTION INTRAMUSCULAR; INTRAVENOUS; SUBCUTANEOUS at 20:19

## 2022-10-15 RX ADMIN — Medication 650 MILLIGRAM(S): at 05:43

## 2022-10-15 RX ADMIN — GABAPENTIN 300 MILLIGRAM(S): 400 CAPSULE ORAL at 10:47

## 2022-10-15 RX ADMIN — HYDROMORPHONE HYDROCHLORIDE 1 MILLIGRAM(S): 2 INJECTION INTRAMUSCULAR; INTRAVENOUS; SUBCUTANEOUS at 23:30

## 2022-10-15 RX ADMIN — OXYCODONE HYDROCHLORIDE 5 MILLIGRAM(S): 5 TABLET ORAL at 15:11

## 2022-10-15 RX ADMIN — SERTRALINE 250 MILLIGRAM(S): 25 TABLET, FILM COATED ORAL at 10:47

## 2022-10-15 RX ADMIN — CYCLOBENZAPRINE HYDROCHLORIDE 10 MILLIGRAM(S): 10 TABLET, FILM COATED ORAL at 14:47

## 2022-10-15 RX ADMIN — HYDROMORPHONE HYDROCHLORIDE 1 MILLIGRAM(S): 2 INJECTION INTRAMUSCULAR; INTRAVENOUS; SUBCUTANEOUS at 19:49

## 2022-10-15 RX ADMIN — OXYCODONE HYDROCHLORIDE 5 MILLIGRAM(S): 5 TABLET ORAL at 08:07

## 2022-10-15 RX ADMIN — Medication 50 MILLIGRAM(S): at 21:44

## 2022-10-15 RX ADMIN — CYCLOBENZAPRINE HYDROCHLORIDE 10 MILLIGRAM(S): 10 TABLET, FILM COATED ORAL at 21:45

## 2022-10-15 RX ADMIN — OXYCODONE HYDROCHLORIDE 5 MILLIGRAM(S): 5 TABLET ORAL at 03:38

## 2022-10-15 RX ADMIN — OXYCODONE HYDROCHLORIDE 5 MILLIGRAM(S): 5 TABLET ORAL at 08:37

## 2022-10-15 RX ADMIN — Medication 1 TABLET(S): at 10:47

## 2022-10-15 RX ADMIN — MIRTAZAPINE 15 MILLIGRAM(S): 45 TABLET, ORALLY DISINTEGRATING ORAL at 22:58

## 2022-10-15 RX ADMIN — ATORVASTATIN CALCIUM 40 MILLIGRAM(S): 80 TABLET, FILM COATED ORAL at 21:45

## 2022-10-15 RX ADMIN — Medication 1 APPLICATION(S): at 21:46

## 2022-10-15 RX ADMIN — NYSTATIN CREAM 1 APPLICATION(S): 100000 CREAM TOPICAL at 21:46

## 2022-10-15 RX ADMIN — GABAPENTIN 300 MILLIGRAM(S): 400 CAPSULE ORAL at 21:45

## 2022-10-15 RX ADMIN — Medication 2: at 08:46

## 2022-10-15 RX ADMIN — OXYCODONE HYDROCHLORIDE 5 MILLIGRAM(S): 5 TABLET ORAL at 14:49

## 2022-10-15 NOTE — PROGRESS NOTE ADULT - SUBJECTIVE AND OBJECTIVE BOX
· Subjective and Objective:   Patient presents for definitive treatment of back and L leg pain. Patient several month history of back pain with left leg pain. Documented spondylolisthesis and spinal stenosis L4/5 but also L extraforaminal HNP L4/5 & L5/S1. Patient failed nonoperative modalities and underwent lumbar lami/fusion L4-S1, TLIF L4/5 & L5/S1 with Marilyn osteotomies, cages, LBG, BMP on 10/11/22.     10/13/22 Patient complaining of pruritic rash and incisional pain.  10/14/22 Patient with incisional pain  10/15/22 Still with incisional back pain.  Was able to get OOB    PAST MEDICAL & SURGICAL HISTORY:  Renal calculi      DM type 2 (diabetes mellitus, type 2)      HTN (hypertension)      Lower back pain      HLD (hyperlipidemia)      Anxiety and depression      At risk for obstructive sleep apnea  DHRUV precautions. Pt &gt;3 criteria on STOP-Bang questionnaire.      S/P       MEDICATIONS  (STANDING):  acetaminophen     Tablet .. 650 milliGRAM(s) Oral every 6 hours  atorvastatin 40 milliGRAM(s) Oral at bedtime  celecoxib 200 milliGRAM(s) Oral daily  cyclobenzaprine 10 milliGRAM(s) Oral every 8 hours  dextrose 5%. 1000 milliLiter(s) (100 mL/Hr) IV Continuous <Continuous>  dextrose 5%. 1000 milliLiter(s) (50 mL/Hr) IV Continuous <Continuous>  dextrose 50% Injectable 25 Gram(s) IV Push once  dextrose 50% Injectable 12.5 Gram(s) IV Push once  dextrose 50% Injectable 25 Gram(s) IV Push once  enalapril 5 milliGRAM(s) Oral daily  fenofibrate Tablet 145 milliGRAM(s) Oral daily  gabapentin 300 milliGRAM(s) Oral two times a day  glucagon  Injectable 1 milliGRAM(s) IntraMuscular once  insulin lispro (ADMELOG) corrective regimen sliding scale   SubCutaneous Before meals and at bedtime  metoprolol succinate  milliGRAM(s) Oral daily  mirtazapine 15 milliGRAM(s) Oral at bedtime  multivitamin 1 Tablet(s) Oral daily  nystatin Powder 1 Application(s) Topical two times a day  pantoprazole  Injectable 40 milliGRAM(s) IV Push daily  sertraline 50 milliGRAM(s) Oral daily  triamcinolone 0.1% Ointment 1 Application(s) Topical three times a day    MEDICATIONS  (PRN):  dextrose Oral Gel 15 Gram(s) Oral once PRN Blood Glucose LESS THAN 70 milliGRAM(s)/deciliter  diphenhydrAMINE Injectable 25 milliGRAM(s) IV Push every 4 hours PRN Rash and/or Itching  HYDROmorphone  Injectable 1 milliGRAM(s) IV Push every 3 hours PRN Severe Pain (7 - 10)  oxyCODONE    IR 10 milliGRAM(s) Oral every 3 hours PRN Moderate Pain (4 - 6)  oxyCODONE    IR 5 milliGRAM(s) Oral every 3 hours PRN Mild Pain (1 - 3)    Allergies    copper (Hives)  morphine (Other)  strawberry (Other (Moderate))    Intolerances    PE:    ICU Vital Signs Last 24 Hrs  T(C): 37.1 (15 Oct 2022 16:12), Max: 37.1 (15 Oct 2022 16:12)  T(F): 98.7 (15 Oct 2022 16:12), Max: 98.7 (15 Oct 2022 16:12)  HR: 69 (15 Oct 2022 16:12) (66 - 84)  BP: 132/65 (15 Oct 2022 16:12) (109/91 - 155/76)  BP(mean): 63 (15 Oct 2022 15:00) (63 - 110)  ABP: --  ABP(mean): --  RR: 18 (15 Oct 2022 16:12) (14 - 28)  SpO2: 95% (15 Oct 2022 16:12) (90% - 99%)    O2 Parameters below as of 15 Oct 2022 16:12  Patient On (Oxygen Delivery Method): room air    Patient sitting up in bed with c/o incisional pain, rash better  Tolerating diet  Incision clean and dry  Deep JAMSHID drainage with moderate drainage - 30mL/shift  Neuro intact LEs; no calf tenderness no c/c/e    LABS                                   9.4    10.06 )-----------( 192      ( 15 Oct 2022 06:31 )             29.4   10-15    140  |  104  |  12  ----------------------------<  158<H>  3.6   |  31  |  0.67    Ca    9.0      15 Oct 2022 06:31  Phos  2.2     10-15  Mg     1.8     10-15      Assessment and Plan:   · Assessment	  Patient presents for definitive treatment of back and L leg pain. Patient several month history of back pain with left leg pain. Documented spondylolisthesis and spinal stenosis L4/5 but also L extraforaminal HNP L4/5 & L5/S1. Patient failed nonoperative modalities and underwent lumbar lami/fusion L4-S1, TLIF L4/5 & L5/S1 with Marilyn osteotomies, cages, LBG, BMP on 10/11/22.     POD#4  Continue analgesia  Cont OOB/PT with brace  H/H stable  Continue JAMSHID today  DVT PPX - SCD    Plan d/w RN, patient, and Dr. Yang.

## 2022-10-16 PROCEDURE — 99233 SBSQ HOSP IP/OBS HIGH 50: CPT

## 2022-10-16 RX ADMIN — GABAPENTIN 300 MILLIGRAM(S): 400 CAPSULE ORAL at 21:07

## 2022-10-16 RX ADMIN — Medication 100 MILLIGRAM(S): at 11:18

## 2022-10-16 RX ADMIN — CYCLOBENZAPRINE HYDROCHLORIDE 10 MILLIGRAM(S): 10 TABLET, FILM COATED ORAL at 14:02

## 2022-10-16 RX ADMIN — OXYCODONE HYDROCHLORIDE 10 MILLIGRAM(S): 5 TABLET ORAL at 18:10

## 2022-10-16 RX ADMIN — ATORVASTATIN CALCIUM 40 MILLIGRAM(S): 80 TABLET, FILM COATED ORAL at 21:08

## 2022-10-16 RX ADMIN — OXYCODONE HYDROCHLORIDE 10 MILLIGRAM(S): 5 TABLET ORAL at 05:59

## 2022-10-16 RX ADMIN — Medication 2: at 22:43

## 2022-10-16 RX ADMIN — Medication 5 MILLIGRAM(S): at 11:18

## 2022-10-16 RX ADMIN — CYCLOBENZAPRINE HYDROCHLORIDE 10 MILLIGRAM(S): 10 TABLET, FILM COATED ORAL at 21:07

## 2022-10-16 RX ADMIN — GABAPENTIN 300 MILLIGRAM(S): 400 CAPSULE ORAL at 11:18

## 2022-10-16 RX ADMIN — Medication 650 MILLIGRAM(S): at 06:29

## 2022-10-16 RX ADMIN — Medication 650 MILLIGRAM(S): at 05:59

## 2022-10-16 RX ADMIN — Medication 145 MILLIGRAM(S): at 11:18

## 2022-10-16 RX ADMIN — MIRTAZAPINE 15 MILLIGRAM(S): 45 TABLET, ORALLY DISINTEGRATING ORAL at 21:07

## 2022-10-16 RX ADMIN — Medication 4: at 12:36

## 2022-10-16 RX ADMIN — OXYCODONE HYDROCHLORIDE 10 MILLIGRAM(S): 5 TABLET ORAL at 06:29

## 2022-10-16 RX ADMIN — CYCLOBENZAPRINE HYDROCHLORIDE 10 MILLIGRAM(S): 10 TABLET, FILM COATED ORAL at 06:00

## 2022-10-16 RX ADMIN — SERTRALINE 250 MILLIGRAM(S): 25 TABLET, FILM COATED ORAL at 21:08

## 2022-10-16 RX ADMIN — OXYCODONE HYDROCHLORIDE 10 MILLIGRAM(S): 5 TABLET ORAL at 19:10

## 2022-10-16 RX ADMIN — HYDROMORPHONE HYDROCHLORIDE 1 MILLIGRAM(S): 2 INJECTION INTRAMUSCULAR; INTRAVENOUS; SUBCUTANEOUS at 00:00

## 2022-10-16 RX ADMIN — Medication 2: at 08:54

## 2022-10-16 NOTE — PROGRESS NOTE ADULT - SUBJECTIVE AND OBJECTIVE BOX
Progress Note:   · Provider Specialty	Orthopedics    Reason for Admission:    Reason for Admission:  · Reason for Admission	Acute hypercapnic respiratory failure      · Subjective and Objective:   · Subjective and Objective:   Patient presents for definitive treatment of back and L leg pain. Patient several month history of back pain with left leg pain. Documented spondylolisthesis and spinal stenosis L4/5 but also L extraforaminal HNP L4/5 & L5/S1. Patient failed nonoperative modalities and underwent lumbar lami/fusion L4-S1, TLIF L4/5 & L5/S1 with Marilyn osteotomies, cages, LBG, BMP on 10/11/22.     10/13/22 Patient complaining of pruritic rash and incisional pain.  10/14/22 Patient with incisional pain  10/15/22 Still with incisional back pain.  Was able to get OOB  10/26/22 Feeling better, was able to OOB with less pain.      PAST MEDICAL & SURGICAL HISTORY:  Renal calculi      DM type 2 (diabetes mellitus, type 2)      HTN (hypertension)      Lower back pain      HLD (hyperlipidemia)      Anxiety and depression      At risk for obstructive sleep apnea  DHRUV precautions. Pt &gt;3 criteria on STOP-Bang questionnaire.      S/P       MEDICATIONS  (STANDING):  acetaminophen     Tablet .. 650 milliGRAM(s) Oral every 6 hours  atorvastatin 40 milliGRAM(s) Oral at bedtime  celecoxib 200 milliGRAM(s) Oral daily  cyclobenzaprine 10 milliGRAM(s) Oral every 8 hours  dextrose 5%. 1000 milliLiter(s) (100 mL/Hr) IV Continuous <Continuous>  dextrose 5%. 1000 milliLiter(s) (50 mL/Hr) IV Continuous <Continuous>  dextrose 50% Injectable 25 Gram(s) IV Push once  dextrose 50% Injectable 12.5 Gram(s) IV Push once  dextrose 50% Injectable 25 Gram(s) IV Push once  enalapril 5 milliGRAM(s) Oral daily  fenofibrate Tablet 145 milliGRAM(s) Oral daily  gabapentin 300 milliGRAM(s) Oral two times a day  glucagon  Injectable 1 milliGRAM(s) IntraMuscular once  insulin lispro (ADMELOG) corrective regimen sliding scale   SubCutaneous Before meals and at bedtime  metoprolol succinate  milliGRAM(s) Oral daily  mirtazapine 15 milliGRAM(s) Oral at bedtime  multivitamin 1 Tablet(s) Oral daily  nystatin Powder 1 Application(s) Topical two times a day  pantoprazole  Injectable 40 milliGRAM(s) IV Push daily  sertraline 50 milliGRAM(s) Oral daily  triamcinolone 0.1% Ointment 1 Application(s) Topical three times a day    MEDICATIONS  (PRN):  dextrose Oral Gel 15 Gram(s) Oral once PRN Blood Glucose LESS THAN 70 milliGRAM(s)/deciliter  diphenhydrAMINE Injectable 25 milliGRAM(s) IV Push every 4 hours PRN Rash and/or Itching  HYDROmorphone  Injectable 1 milliGRAM(s) IV Push every 3 hours PRN Severe Pain (7 - 10)  oxyCODONE    IR 10 milliGRAM(s) Oral every 3 hours PRN Moderate Pain (4 - 6)  oxyCODONE    IR 5 milliGRAM(s) Oral every 3 hours PRN Mild Pain (1 - 3)    Allergies    copper (Hives)  morphine (Other)  strawberry (Other (Moderate))    Intolerances    PE:  ICU Vital Signs Last 24 Hrs  T(C): 36.8 (16 Oct 2022 09:00), Max: 37.6 (15 Oct 2022 20:28)  T(F): 98.2 (16 Oct 2022 09:00), Max: 99.6 (15 Oct 2022 20:28)  HR: 70 (16 Oct 2022 09:00) (70 - 79)  BP: 121/55 (16 Oct 2022 09:00) (108/48 - 129/50)  BP(mean): --  ABP: --  ABP(mean): --  RR: 18 (16 Oct 2022 09:00) (18 - 18)  SpO2: 94% (16 Oct 2022 09:00) (94% - 96%)      Patient sitting up in bed with c/o incisional pain, rash better  Tolerating diet  Incision clean and dry  Deep JAMSHID drainage with moderate drainage - 30mL/shift  Neuro intact LEs; no calf tenderness no c/c/e    LABS                                   9.4    10.06 )-----------( 192      ( 15 Oct 2022 06:31 )             29.4   10-15    140  |  104  |  12  ----------------------------<  158<H>  3.6   |  31  |  0.67    Ca    9.0      15 Oct 2022 06:31  Phos  2.2     10-15  Mg     1.8     10-15      Assessment and Plan:   · Assessment	  Patient presents for definitive treatment of back and L leg pain. Patient several month history of back pain with left leg pain. Documented spondylolisthesis and spinal stenosis L4/5 but also L extraforaminal HNP L4/5 & L5/S1. Patient failed nonoperative modalities and underwent lumbar lami/fusion L4-S1, TLIF L4/5 & L5/S1 with Marilyn osteotomies, cages, LBG, BMP on 10/11/22.     POD#5  Continue analgesia  Cont OOB/PT with brace  H/H stable  Continue JAMSHID today  DVT PPX - SCD    Plan d/w RN, patient, and Dr. Yang.  dispo planning

## 2022-10-16 NOTE — PROGRESS NOTE ADULT - SUBJECTIVE AND OBJECTIVE BOX
CC- back pain s/p lumbar lami/ fusion on 10/11    HPI:  66 y/o F with PMHx type 2 DM, HTN, and HLD who presented to  for elective laminectomy L4/L5 lumbar fusion. Post procedure, pt developed worsening lethargy. An ABG was obtained which revealed severe respiratory acidosis (6.97/113/100/26). Pt was urgently intubated by anesthesia and admitted to ICU. Placed on pressor support for postop hypotension. Patient stabilized in ICU, extubated and taken off pressors on 10/12. Transferred to . Hospitalist called to resume medical part of her care    10/16/22- wearing back brace. Up and ambulated with PT. JAMSHID drain- 95    Review of system- All 10 systems reviewed and is as per HPI otherwise negative.     T(C): 36.8 (10-16-22 @ 09:00), Max: 37.6 (10-15-22 @ 20:28)  HR: 70 (10-16-22 @ 09:00) (69 - 83)  BP: 121/55 (10-16-22 @ 09:00) (108/48 - 140/60)  RR: 18 (10-16-22 @ 09:00) (18 - 18)  SpO2: 94% (10-16-22 @ 09:00) (94% - 99%)  Wt(kg): --    LABS:                        9.4    10.06 )-----------( 192      ( 15 Oct 2022 06:31 )             29.4     10-15    140  |  104  |  12  ----------------------------<  158<H>  3.6   |  31  |  0.67    Ca    9.0      15 Oct 2022 06:31  Phos  2.2     10-15  Mg     1.8     10-15    CAPILLARY BLOOD GLUCOSE  POCT Blood Glucose.: 213 mg/dL (16 Oct 2022 11:41)  POCT Blood Glucose.: 157 mg/dL (16 Oct 2022 07:59)  POCT Blood Glucose.: 319 mg/dL (15 Oct 2022 21:24)  POCT Blood Glucose.: 143 mg/dL (15 Oct 2022 18:02)    RADIOLOGY & ADDITIONAL TESTS:      PHYSICAL EXAM:  GENERAL: NAD, well-groomed, well-developed  HEAD:  Atraumatic, Normocephalic  EYES: EOMI, PERRLA, conjunctiva and sclera clear  HEENT: Moist mucous membranes  NECK: Supple, No JVD  NERVOUS SYSTEM:  Alert & Oriented X3, Motor Strength 5/5 B/L upper and lower extremities; DTRs 2+ intact and symmetric  CHEST/LUNG: Clear to auscultation bilaterally; No rales, rhonchi, wheezing, or rubs  HEART: Regular rate and rhythm; No murmurs, rubs, or gallops  ABDOMEN: Soft, Nontender, Nondistended; Bowel sounds present  GENITOURINARY- Voiding, no palpable bladder  EXTREMITIES:  2+ Peripheral Pulses, No clubbing, cyanosis, or edema  MUSCULOSKELTAL- TLSO brace on, JAMSHID+  SKIN-no rash, no lesion  CNS- alert, oriented X3, non focal     MEDICATIONS  (STANDING):  acetaminophen     Tablet .. 650 milliGRAM(s) Oral every 6 hours  atorvastatin 40 milliGRAM(s) Oral at bedtime  cyclobenzaprine 10 milliGRAM(s) Oral every 8 hours  dextrose 5%. 1000 milliLiter(s) (100 mL/Hr) IV Continuous <Continuous>  dextrose 5%. 1000 milliLiter(s) (50 mL/Hr) IV Continuous <Continuous>  dextrose 50% Injectable 25 Gram(s) IV Push once  dextrose 50% Injectable 12.5 Gram(s) IV Push once  dextrose 50% Injectable 25 Gram(s) IV Push once  enalapril 5 milliGRAM(s) Oral daily  fenofibrate Tablet 145 milliGRAM(s) Oral daily  gabapentin 300 milliGRAM(s) Oral two times a day  glucagon  Injectable 1 milliGRAM(s) IntraMuscular once  insulin lispro (ADMELOG) corrective regimen sliding scale   SubCutaneous Before meals and at bedtime  metoprolol succinate  milliGRAM(s) Oral daily  mirtazapine 15 milliGRAM(s) Oral at bedtime  multivitamin 1 Tablet(s) Oral daily  nystatin Powder 1 Application(s) Topical two times a day  sertraline 250 milliGRAM(s) Oral daily  triamcinolone 0.1% Ointment 1 Application(s) Topical three times a day    MEDICATIONS  (PRN):  dextrose Oral Gel 15 Gram(s) Oral once PRN Blood Glucose LESS THAN 70 milliGRAM(s)/deciliter  diphenhydrAMINE Injectable 50 milliGRAM(s) IV Push every 4 hours PRN Rash and/or Itching  HYDROmorphone  Injectable 1 milliGRAM(s) IV Push every 3 hours PRN Severe Pain (7 - 10)  oxyCODONE    IR 10 milliGRAM(s) Oral every 3 hours PRN Moderate Pain (4 - 6)  oxyCODONE    IR 5 milliGRAM(s) Oral every 3 hours PRN Mild Pain (1 - 3)    Assessment/Plan  #Spinal stenosis s/p lumbar laminectomy/ fusion L4-S1, TLIF L4-5 and L5-S1 on 10/11  Spine following  Pain meds prn  TLSO brace on, OOb with PT  Incentive spirometry  Muscle relaxants prn  Monitor Jamshid drain output- 95cc    #Acute hypoxic resp failure postop- resolved. Pt extubated    #Hypotension postop- resolved. Off pressors    #Diabetes- ISS    #DVT proph- venodynes    #Dispo- will follow with you. Per spine

## 2022-10-17 ENCOUNTER — TRANSCRIPTION ENCOUNTER (OUTPATIENT)
Age: 67
End: 2022-10-17

## 2022-10-17 VITALS
HEART RATE: 64 BPM | OXYGEN SATURATION: 96 % | TEMPERATURE: 99 F | DIASTOLIC BLOOD PRESSURE: 53 MMHG | SYSTOLIC BLOOD PRESSURE: 142 MMHG | RESPIRATION RATE: 16 BRPM

## 2022-10-17 LAB
ANION GAP SERPL CALC-SCNC: 6 MMOL/L — SIGNIFICANT CHANGE UP (ref 5–17)
BUN SERPL-MCNC: 17 MG/DL — SIGNIFICANT CHANGE UP (ref 7–23)
CALCIUM SERPL-MCNC: 9.4 MG/DL — SIGNIFICANT CHANGE UP (ref 8.5–10.1)
CHLORIDE SERPL-SCNC: 106 MMOL/L — SIGNIFICANT CHANGE UP (ref 96–108)
CO2 SERPL-SCNC: 29 MMOL/L — SIGNIFICANT CHANGE UP (ref 22–31)
CREAT SERPL-MCNC: 0.94 MG/DL — SIGNIFICANT CHANGE UP (ref 0.5–1.3)
EGFR: 66 ML/MIN/1.73M2 — SIGNIFICANT CHANGE UP
GLUCOSE SERPL-MCNC: 205 MG/DL — HIGH (ref 70–99)
HCT VFR BLD CALC: 30.3 % — LOW (ref 34.5–45)
HGB BLD-MCNC: 9.7 G/DL — LOW (ref 11.5–15.5)
MCHC RBC-ENTMCNC: 28 PG — SIGNIFICANT CHANGE UP (ref 27–34)
MCHC RBC-ENTMCNC: 32 GM/DL — SIGNIFICANT CHANGE UP (ref 32–36)
MCV RBC AUTO: 87.3 FL — SIGNIFICANT CHANGE UP (ref 80–100)
PLATELET # BLD AUTO: 274 K/UL — SIGNIFICANT CHANGE UP (ref 150–400)
POTASSIUM SERPL-MCNC: 4 MMOL/L — SIGNIFICANT CHANGE UP (ref 3.5–5.3)
POTASSIUM SERPL-SCNC: 4 MMOL/L — SIGNIFICANT CHANGE UP (ref 3.5–5.3)
RBC # BLD: 3.47 M/UL — LOW (ref 3.8–5.2)
RBC # FLD: 14.7 % — HIGH (ref 10.3–14.5)
SODIUM SERPL-SCNC: 141 MMOL/L — SIGNIFICANT CHANGE UP (ref 135–145)
WBC # BLD: 7.53 K/UL — SIGNIFICANT CHANGE UP (ref 3.8–10.5)
WBC # FLD AUTO: 7.53 K/UL — SIGNIFICANT CHANGE UP (ref 3.8–10.5)

## 2022-10-17 PROCEDURE — 99232 SBSQ HOSP IP/OBS MODERATE 35: CPT

## 2022-10-17 RX ORDER — GABAPENTIN 400 MG/1
0 CAPSULE ORAL
Qty: 0 | Refills: 0 | DISCHARGE

## 2022-10-17 RX ADMIN — Medication 5 MILLIGRAM(S): at 09:24

## 2022-10-17 RX ADMIN — CYCLOBENZAPRINE HYDROCHLORIDE 10 MILLIGRAM(S): 10 TABLET, FILM COATED ORAL at 06:46

## 2022-10-17 RX ADMIN — Medication 100 MILLIGRAM(S): at 09:25

## 2022-10-17 RX ADMIN — OXYCODONE HYDROCHLORIDE 10 MILLIGRAM(S): 5 TABLET ORAL at 01:13

## 2022-10-17 RX ADMIN — GABAPENTIN 300 MILLIGRAM(S): 400 CAPSULE ORAL at 09:24

## 2022-10-17 RX ADMIN — HYDROMORPHONE HYDROCHLORIDE 1 MILLIGRAM(S): 2 INJECTION INTRAMUSCULAR; INTRAVENOUS; SUBCUTANEOUS at 10:19

## 2022-10-17 RX ADMIN — OXYCODONE HYDROCHLORIDE 10 MILLIGRAM(S): 5 TABLET ORAL at 01:43

## 2022-10-17 RX ADMIN — Medication 50 MILLIGRAM(S): at 00:40

## 2022-10-17 RX ADMIN — Medication 2: at 08:36

## 2022-10-17 RX ADMIN — NYSTATIN CREAM 1 APPLICATION(S): 100000 CREAM TOPICAL at 09:25

## 2022-10-17 RX ADMIN — HYDROMORPHONE HYDROCHLORIDE 1 MILLIGRAM(S): 2 INJECTION INTRAMUSCULAR; INTRAVENOUS; SUBCUTANEOUS at 08:41

## 2022-10-17 RX ADMIN — Medication 1 TABLET(S): at 09:25

## 2022-10-17 RX ADMIN — Medication 145 MILLIGRAM(S): at 09:24

## 2022-10-17 NOTE — DISCHARGE NOTE PROVIDER - NSDCMRMEDTOKEN_GEN_ALL_CORE_FT
Ambien 10 mg oral tablet: 1 tab(s) orally once a day (at bedtime)  atorvastatin 40 mg oral tablet: 1 tab(s) orally once a day  enalapril 5 mg oral tablet: 1 tab(s) orally once a day  fenofibrate 145 mg oral tablet: 1 tab(s) orally once a day  glimepiride 2 mg oral tablet: 1 tab(s) orally once a day  Jardiance 25 mg oral tablet: 1 tab(s) orally once a day (in the morning)  metFORMIN 1000 mg oral tablet: 1 tab(s) orally once a day  metoprolol succinate 100 mg oral tablet, extended release: 1 tab(s) orally once a day  mirtazapine 15 mg oral tablet: 1 tab(s) orally once a day (at bedtime)  sertraline 200 mg oral capsule: 1 cap(s) orally once a day  sertraline 50 mg oral tablet: 1 tab(s) orally once a day  total dise 250mg  silver sulfADIAZINE 1% topical cream: Apply topically to affected area 2 times a day MDD:2 applications  Trulicity Pen 1.5 mg/0.5 mL subcutaneous solution: subcutaneous once a week on Momdays last taken today.  Vicodin 5 mg-500 mg oral tablet: orally once a day, As Needed

## 2022-10-17 NOTE — PROGRESS NOTE ADULT - SUBJECTIVE AND OBJECTIVE BOX
CC- back pain s/p lumbar lami/ fusion on 10/11    HPI:  68 y/o F with PMHx type 2 DM, HTN, and HLD who presented to  for elective laminectomy L4/L5 lumbar fusion. Post procedure, pt developed worsening lethargy. An ABG was obtained which revealed severe respiratory acidosis (6.97/113/100/26). Pt was urgently intubated by anesthesia and admitted to ICU. Placed on pressor support for postop hypotension. Patient stabilized in ICU, extubated and taken off pressors on 10/12. Transferred to . Hospitalist called to resume medical part of her care    10/17/22- wearing back brace. Up and ambulated with PT. JAMSHID drain d/c'd     Review of system- All 10 systems reviewed and is as per HPI otherwise negative.       Vital Signs Last 24 Hrs  T(C): 37.1 (10-17-22 @ 08:44), Max: 37.1 (10-17-22 @ 08:44)  T(F): 98.8 (10-17-22 @ 08:44), Max: 98.8 (10-17-22 @ 08:44)  HR: 64 (10-17-22 @ 08:44) (64 - 81)  BP: 142/53 (10-17-22 @ 08:44) (142/53 - 146/56)  BP(mean): --  RR: 16 (10-17-22 @ 08:44) (16 - 16)  SpO2: 96% (10-17-22 @ 08:44) (96% - 98%)    LABS:                                          9.7    7.53  )-----------( 274      ( 17 Oct 2022 09:05 )             30.3       10-17    141  |  106  |  17  ----------------------------<  205<H>  4.0   |  29  |  0.94    Ca    9.4      17 Oct 2022 09:05          CAPILLARY BLOOD GLUCOSE  POCT Blood Glucose.: 190 mg/dL (10.17.22 @ 12:47)   POCT Blood Glucose.: 194 mg/dL (10.17.22 @ 08:09)   POCT Blood Glucose.: 213 mg/dL (16 Oct 2022 11:41)  POCT Blood Glucose.: 157 mg/dL (16 Oct 2022 07:59)  POCT Blood Glucose.: 319 mg/dL (15 Oct 2022 21:24)  POCT Blood Glucose.: 143 mg/dL (15 Oct 2022 18:02)    RADIOLOGY & ADDITIONAL TESTS:      PHYSICAL EXAM:  GENERAL: NAD, well-groomed, well-developed  HEAD:  Atraumatic, Normocephalic  EYES: EOMI, PERRLA, conjunctiva and sclera clear  HEENT: Moist mucous membranes  NECK: Supple, No JVD  NERVOUS SYSTEM:  Alert & Oriented X3, Motor Strength 5/5 B/L upper and lower extremities; DTRs 2+ intact and symmetric  CHEST/LUNG: Clear to auscultation bilaterally; No rales, rhonchi, wheezing, or rubs  HEART: Regular rate and rhythm; No murmurs, rubs, or gallops  ABDOMEN: Soft, Nontender, Nondistended; Bowel sounds present  GENITOURINARY- Voiding, no palpable bladder  EXTREMITIES:  2+ Peripheral Pulses, No clubbing, cyanosis, or edema  MUSCULOSKELTAL- TLSO brace on, JAMSHID d/c'd, back dsg and steristrips c/d/i  SKIN-no rash, no lesion  CNS- alert, oriented X3, non focal     MEDICATIONS  (STANDING):  acetaminophen     Tablet .. 650 milliGRAM(s) Oral every 6 hours  atorvastatin 40 milliGRAM(s) Oral at bedtime  cyclobenzaprine 10 milliGRAM(s) Oral every 8 hours  dextrose 5%. 1000 milliLiter(s) (100 mL/Hr) IV Continuous <Continuous>  dextrose 5%. 1000 milliLiter(s) (50 mL/Hr) IV Continuous <Continuous>  dextrose 50% Injectable 25 Gram(s) IV Push once  dextrose 50% Injectable 12.5 Gram(s) IV Push once  dextrose 50% Injectable 25 Gram(s) IV Push once  enalapril 5 milliGRAM(s) Oral daily  fenofibrate Tablet 145 milliGRAM(s) Oral daily  gabapentin 300 milliGRAM(s) Oral two times a day  glucagon  Injectable 1 milliGRAM(s) IntraMuscular once  insulin lispro (ADMELOG) corrective regimen sliding scale   SubCutaneous Before meals and at bedtime  metoprolol succinate  milliGRAM(s) Oral daily  mirtazapine 15 milliGRAM(s) Oral at bedtime  multivitamin 1 Tablet(s) Oral daily  nystatin Powder 1 Application(s) Topical two times a day  sertraline 250 milliGRAM(s) Oral daily  triamcinolone 0.1% Ointment 1 Application(s) Topical three times a day    MEDICATIONS  (PRN):  dextrose Oral Gel 15 Gram(s) Oral once PRN Blood Glucose LESS THAN 70 milliGRAM(s)/deciliter  diphenhydrAMINE Injectable 50 milliGRAM(s) IV Push every 4 hours PRN Rash and/or Itching  HYDROmorphone  Injectable 1 milliGRAM(s) IV Push every 3 hours PRN Severe Pain (7 - 10)  oxyCODONE    IR 10 milliGRAM(s) Oral every 3 hours PRN Moderate Pain (4 - 6)  oxyCODONE    IR 5 milliGRAM(s) Oral every 3 hours PRN Mild Pain (1 - 3)    Assessment/Plan  #Spinal stenosis s/p lumbar laminectomy/ fusion L4-S1, TLIF L4-5 and L5-S1 on 10/11  Spine following  Pain meds prn  TLSO brace on, OOb with PT  Incentive spirometry  Muscle relaxants prn      #Acute hypoxic resp failure postop-   resolved.     #Hypotension postop-   resolved.    #Diabetes- ISS    #DVT proph- venodynes    #Dispo- home today

## 2022-10-17 NOTE — PROGRESS NOTE ADULT - REASON FOR ADMISSION
Acute hypercapnic respiratory failure
Acute hypercapnic respiratory failure
S/p lumbar lami/fusion
Acute hypercapnic respiratory failure

## 2022-10-17 NOTE — PROGRESS NOTE ADULT - PROVIDER SPECIALTY LIST ADULT
Hospitalist
Hospitalist
Orthopedics
Orthopedics
Critical Care
Orthopedics
Critical Care

## 2022-10-17 NOTE — DISCHARGE NOTE PROVIDER - CARE PROVIDER_API CALL
Francis Yang)  Orthopaedic Surgery  [address] [place] [address]  Phone: [phone]  Fax: [phone]  Follow Up Time:

## 2022-10-17 NOTE — DISCHARGE NOTE NURSING/CASE MANAGEMENT/SOCIAL WORK - PATIENT PORTAL LINK FT
You can access the FollowMyHealth Patient Portal offered by [facility] by registering at the following website: [email or web address]. By joining [facility]’s FollowMyHealth portal, you will also be able to view your health information using other applications (apps) compatible with our system.

## 2022-10-17 NOTE — DISCHARGE NOTE PROVIDER - NSDCCPCAREPLAN_GEN_ALL_CORE_FT
PRINCIPAL DISCHARGE DIAGNOSIS  Diagnosis: Spinal instability of lumbar region  Assessment and Plan of Treatment:

## 2022-10-17 NOTE — DISCHARGE NOTE NURSING/CASE MANAGEMENT/SOCIAL WORK - NSDCPEFALRISK_GEN_ALL_CORE
For information on Fall & Injury Prevention, visit: [email or web address] OR  [email or web address] OR  https://www.cdc.gov/steadi/patient.html

## 2022-10-17 NOTE — DISCHARGE NOTE PROVIDER - HOSPITAL COURSE
Patient presents for definitive treatment of back and L leg pain. Patient several month history of back pain with left leg pain. Documented spondylolisthesis and spinal stenosis L4/5 but also L extraforaminal HNP L4/5 & L5/S1. Patient failed nonoperative modalities and underwent lumbar lami/fusion L4-S1, TLIF L4/5 & L5/S1 with Marilyn osteotomies, cages, LBG, BMP on 10/11/22.     10/13/22 Patient complaining of pruritic rash and incisional pain. PCA discontinued. JPs with persistent drainage. Celebrex stopped.. Transfer to   10/14/22 Patient with incisional pain, Superficial JAMSHID with decreased drainage-removed, Deep JAMSHID with persistent drainage. Rash improved. OOB/PT  10/15/22 Patient with incisional pain. Persistent deep JAMSHID drainage  10/16/22 Patient more comfortable. JAMSHID with decreasing drainage-left in. Neuro intact.  10/17/22 Patient comfortable. JAMSHID with minimal drainage-removed. Incision clean and dry, some blisters related to dressing. Neuro intact. Stable for discharge.

## 2022-10-21 ENCOUNTER — INPATIENT (INPATIENT)
Facility: HOSPITAL | Age: 67
LOS: 2 days | Discharge: HOME | DRG: 607 | End: 2022-10-24
Attending: ORTHOPAEDIC SURGERY | Admitting: ORTHOPAEDIC SURGERY
Payer: MEDICARE

## 2022-10-21 VITALS
WEIGHT: 220.02 LBS | OXYGEN SATURATION: 96 % | HEIGHT: 62 IN | RESPIRATION RATE: 18 BRPM | SYSTOLIC BLOOD PRESSURE: 151 MMHG | TEMPERATURE: 99 F | DIASTOLIC BLOOD PRESSURE: 70 MMHG | HEART RATE: 61 BPM

## 2022-10-21 DIAGNOSIS — Z98.891 HISTORY OF UTERINE SCAR FROM PREVIOUS SURGERY: Chronic | ICD-10-CM

## 2022-10-21 DIAGNOSIS — L24.A9 IRRITANT CONTACT DERMATITIS DUE FRICTION OR CONTACT WITH OTHER SPECIFIED BODY FLUIDS: ICD-10-CM

## 2022-10-21 DIAGNOSIS — Z98.1 ARTHRODESIS STATUS: Chronic | ICD-10-CM

## 2022-10-21 DIAGNOSIS — Z98.890 OTHER SPECIFIED POSTPROCEDURAL STATES: Chronic | ICD-10-CM

## 2022-10-21 LAB
ALBUMIN SERPL ELPH-MCNC: 2.9 G/DL — LOW (ref 3.3–5)
ALP SERPL-CCNC: 38 U/L — LOW (ref 40–120)
ALT FLD-CCNC: 21 U/L — SIGNIFICANT CHANGE UP (ref 12–78)
ANION GAP SERPL CALC-SCNC: 4 MMOL/L — LOW (ref 5–17)
APPEARANCE UR: CLEAR — SIGNIFICANT CHANGE UP
AST SERPL-CCNC: 32 U/L — SIGNIFICANT CHANGE UP (ref 15–37)
BASOPHILS # BLD AUTO: 0.07 K/UL — SIGNIFICANT CHANGE UP (ref 0–0.2)
BASOPHILS NFR BLD AUTO: 0.7 % — SIGNIFICANT CHANGE UP (ref 0–2)
BILIRUB SERPL-MCNC: 0.3 MG/DL — SIGNIFICANT CHANGE UP (ref 0.2–1.2)
BILIRUB UR-MCNC: NEGATIVE — SIGNIFICANT CHANGE UP
BUN SERPL-MCNC: 17 MG/DL — SIGNIFICANT CHANGE UP (ref 7–23)
CALCIUM SERPL-MCNC: 9.5 MG/DL — SIGNIFICANT CHANGE UP (ref 8.5–10.1)
CHLORIDE SERPL-SCNC: 106 MMOL/L — SIGNIFICANT CHANGE UP (ref 96–108)
CO2 SERPL-SCNC: 28 MMOL/L — SIGNIFICANT CHANGE UP (ref 22–31)
COLOR SPEC: YELLOW — SIGNIFICANT CHANGE UP
CREAT SERPL-MCNC: 0.91 MG/DL — SIGNIFICANT CHANGE UP (ref 0.5–1.3)
DIFF PNL FLD: ABNORMAL
EGFR: 69 ML/MIN/1.73M2 — SIGNIFICANT CHANGE UP
EOSINOPHIL # BLD AUTO: 0.35 K/UL — SIGNIFICANT CHANGE UP (ref 0–0.5)
EOSINOPHIL NFR BLD AUTO: 3.6 % — SIGNIFICANT CHANGE UP (ref 0–6)
FLUAV AG NPH QL: SIGNIFICANT CHANGE UP
FLUBV AG NPH QL: SIGNIFICANT CHANGE UP
GLUCOSE SERPL-MCNC: 177 MG/DL — HIGH (ref 70–99)
GLUCOSE UR QL: 1000 MG/DL
HCT VFR BLD CALC: 31.9 % — LOW (ref 34.5–45)
HGB BLD-MCNC: 9.8 G/DL — LOW (ref 11.5–15.5)
IMM GRANULOCYTES NFR BLD AUTO: 1.5 % — HIGH (ref 0–0.9)
KETONES UR-MCNC: NEGATIVE — SIGNIFICANT CHANGE UP
LEUKOCYTE ESTERASE UR-ACNC: ABNORMAL
LYMPHOCYTES # BLD AUTO: 2.5 K/UL — SIGNIFICANT CHANGE UP (ref 1–3.3)
LYMPHOCYTES # BLD AUTO: 25.7 % — SIGNIFICANT CHANGE UP (ref 13–44)
MCHC RBC-ENTMCNC: 27.7 PG — SIGNIFICANT CHANGE UP (ref 27–34)
MCHC RBC-ENTMCNC: 30.7 GM/DL — LOW (ref 32–36)
MCV RBC AUTO: 90.1 FL — SIGNIFICANT CHANGE UP (ref 80–100)
MONOCYTES # BLD AUTO: 0.7 K/UL — SIGNIFICANT CHANGE UP (ref 0–0.9)
MONOCYTES NFR BLD AUTO: 7.2 % — SIGNIFICANT CHANGE UP (ref 2–14)
NEUTROPHILS # BLD AUTO: 5.94 K/UL — SIGNIFICANT CHANGE UP (ref 1.8–7.4)
NEUTROPHILS NFR BLD AUTO: 61.3 % — SIGNIFICANT CHANGE UP (ref 43–77)
NITRITE UR-MCNC: NEGATIVE — SIGNIFICANT CHANGE UP
PH UR: 7 — SIGNIFICANT CHANGE UP (ref 5–8)
PLATELET # BLD AUTO: 349 K/UL — SIGNIFICANT CHANGE UP (ref 150–400)
POTASSIUM SERPL-MCNC: 5 MMOL/L — SIGNIFICANT CHANGE UP (ref 3.5–5.3)
POTASSIUM SERPL-SCNC: 5 MMOL/L — SIGNIFICANT CHANGE UP (ref 3.5–5.3)
PROT SERPL-MCNC: 7.2 GM/DL — SIGNIFICANT CHANGE UP (ref 6–8.3)
PROT UR-MCNC: 15
RBC # BLD: 3.54 M/UL — LOW (ref 3.8–5.2)
RBC # FLD: 14.7 % — HIGH (ref 10.3–14.5)
RSV RNA NPH QL NAA+NON-PROBE: SIGNIFICANT CHANGE UP
SARS-COV-2 RNA SPEC QL NAA+PROBE: SIGNIFICANT CHANGE UP
SODIUM SERPL-SCNC: 138 MMOL/L — SIGNIFICANT CHANGE UP (ref 135–145)
SP GR SPEC: 1.01 — SIGNIFICANT CHANGE UP (ref 1.01–1.02)
UROBILINOGEN FLD QL: NEGATIVE — SIGNIFICANT CHANGE UP
WBC # BLD: 9.71 K/UL — SIGNIFICANT CHANGE UP (ref 3.8–10.5)
WBC # FLD AUTO: 9.71 K/UL — SIGNIFICANT CHANGE UP (ref 3.8–10.5)

## 2022-10-21 PROCEDURE — 99285 EMERGENCY DEPT VISIT HI MDM: CPT

## 2022-10-21 PROCEDURE — 72148 MRI LUMBAR SPINE W/O DYE: CPT | Mod: 52

## 2022-10-21 PROCEDURE — 97116 GAIT TRAINING THERAPY: CPT | Mod: GP

## 2022-10-21 PROCEDURE — 72131 CT LUMBAR SPINE W/O DYE: CPT | Mod: 26,ME

## 2022-10-21 PROCEDURE — 82962 GLUCOSE BLOOD TEST: CPT

## 2022-10-21 PROCEDURE — 97530 THERAPEUTIC ACTIVITIES: CPT | Mod: GP

## 2022-10-21 PROCEDURE — 97162 PT EVAL MOD COMPLEX 30 MIN: CPT | Mod: GP

## 2022-10-21 PROCEDURE — G1004: CPT

## 2022-10-21 RX ORDER — SERTRALINE 25 MG/1
50 TABLET, FILM COATED ORAL DAILY
Refills: 0 | Status: DISCONTINUED | OUTPATIENT
Start: 2022-10-21 | End: 2022-10-24

## 2022-10-21 RX ORDER — SERTRALINE 25 MG/1
200 TABLET, FILM COATED ORAL DAILY
Refills: 0 | Status: DISCONTINUED | OUTPATIENT
Start: 2022-10-21 | End: 2022-10-24

## 2022-10-21 RX ORDER — MIRTAZAPINE 45 MG/1
15 TABLET, ORALLY DISINTEGRATING ORAL AT BEDTIME
Refills: 0 | Status: DISCONTINUED | OUTPATIENT
Start: 2022-10-21 | End: 2022-10-24

## 2022-10-21 RX ORDER — METOPROLOL TARTRATE 50 MG
100 TABLET ORAL DAILY
Refills: 0 | Status: DISCONTINUED | OUTPATIENT
Start: 2022-10-21 | End: 2022-10-24

## 2022-10-21 RX ORDER — FENOFIBRATE,MICRONIZED 130 MG
145 CAPSULE ORAL DAILY
Refills: 0 | Status: DISCONTINUED | OUTPATIENT
Start: 2022-10-21 | End: 2022-10-24

## 2022-10-21 RX ORDER — HYDROMORPHONE HYDROCHLORIDE 2 MG/ML
1 INJECTION INTRAMUSCULAR; INTRAVENOUS; SUBCUTANEOUS ONCE
Refills: 0 | Status: DISCONTINUED | OUTPATIENT
Start: 2022-10-21 | End: 2022-10-21

## 2022-10-21 RX ORDER — ATORVASTATIN CALCIUM 80 MG/1
40 TABLET, FILM COATED ORAL AT BEDTIME
Refills: 0 | Status: DISCONTINUED | OUTPATIENT
Start: 2022-10-21 | End: 2022-10-24

## 2022-10-21 RX ORDER — ZOLPIDEM TARTRATE 10 MG/1
5 TABLET ORAL AT BEDTIME
Refills: 0 | Status: DISCONTINUED | OUTPATIENT
Start: 2022-10-21 | End: 2022-10-24

## 2022-10-21 RX ORDER — DIAZEPAM 5 MG
5 TABLET ORAL ONCE
Refills: 0 | Status: DISCONTINUED | OUTPATIENT
Start: 2022-10-21 | End: 2022-10-21

## 2022-10-21 RX ORDER — GLIMEPIRIDE 1 MG
2 TABLET ORAL DAILY
Refills: 0 | Status: DISCONTINUED | OUTPATIENT
Start: 2022-10-21 | End: 2022-10-22

## 2022-10-21 RX ORDER — GABAPENTIN 400 MG/1
300 CAPSULE ORAL ONCE
Refills: 0 | Status: COMPLETED | OUTPATIENT
Start: 2022-10-21 | End: 2022-10-21

## 2022-10-21 RX ORDER — METFORMIN HYDROCHLORIDE 850 MG/1
1000 TABLET ORAL DAILY
Refills: 0 | Status: DISCONTINUED | OUTPATIENT
Start: 2022-10-21 | End: 2022-10-22

## 2022-10-21 RX ORDER — LIDOCAINE 4 G/100G
1 CREAM TOPICAL ONCE
Refills: 0 | Status: COMPLETED | OUTPATIENT
Start: 2022-10-21 | End: 2022-10-21

## 2022-10-21 RX ADMIN — HYDROMORPHONE HYDROCHLORIDE 1 MILLIGRAM(S): 2 INJECTION INTRAMUSCULAR; INTRAVENOUS; SUBCUTANEOUS at 20:57

## 2022-10-21 RX ADMIN — GABAPENTIN 300 MILLIGRAM(S): 400 CAPSULE ORAL at 23:28

## 2022-10-21 RX ADMIN — HYDROMORPHONE HYDROCHLORIDE 1 MILLIGRAM(S): 2 INJECTION INTRAMUSCULAR; INTRAVENOUS; SUBCUTANEOUS at 20:14

## 2022-10-21 RX ADMIN — Medication 5 MILLIGRAM(S): at 21:43

## 2022-10-21 RX ADMIN — LIDOCAINE 1 PATCH: 4 CREAM TOPICAL at 21:43

## 2022-10-21 NOTE — H&P ADULT - NSHPPHYSICALEXAM_GEN_ALL_CORE
Patient laying on R side on ER stretcher c/o paresthesia left lateral foot-nontoxic appearing    Vital Signs Last 24 Hrs  T(C): 37.2 (21 Oct 2022 19:18), Max: 37.2 (21 Oct 2022 19:18)  T(F): 99 (21 Oct 2022 19:18), Max: 99 (21 Oct 2022 19:18)  HR: 61 (21 Oct 2022 19:18) (61 - 61)  BP: 151/70 (21 Oct 2022 19:18) (151/70 - 151/70)  BP(mean): --  RR: 18 (21 Oct 2022 19:18) (18 - 18)  SpO2: 96% (21 Oct 2022 19:18) (96% - 96%)    Parameters below as of 21 Oct 2022 19:18  Patient On (Oxygen Delivery Method): room air    HEENT unremarkable  Neck supple without tenderness, no pain with AROM, no adenopathy, thyroid nonpalpable, no bruits  Heart with RRR  Lungs clear  Abdomen obese, soft, NT, no rebound or guarding  Extremities with FROM, no joint swelling or tenderness, no E/C/C, no calf tenderness, negative Andrew's  T-L spine with recent midline surgical incision lumbar spine-no active drainage but patient's shirt with evidence of serous drainage, very slight superficial dehiscence junction upper 1/3, no tenderness, postop blisters well healed, negative SLR, no focal weakness LEs, (+) L lateral foot paresthesia  Vascular unremarkable

## 2022-10-21 NOTE — H&P ADULT - ASSESSMENT
68 y/o F with PMHx type 2 DM, HTN, DHRUV, anxiety/depression and HLD presents emergently having developed wound drainage and paresthesia L foot. Patient underwent lumbar lami/fusion/TLIF L4-S1 with excision L L4/5 & L5/S1 extraforaminal HNP on 10/11/22. She required postop intubation for acute respiratory distress and was extubated the following morning. Patient was discharged home on 10/17/22 without complaints of radicular pain. This week she has noticed wound drainage withouit evidence of fever/chills and then developed increased drainage earlier to day as well as paresthesia in her L foot. Patient denies weakness LEs or change in bowel/bladder function, HA, saddle paresthesia.    IMP:  Post alminectomy syndrome  Possible post op seroma  Wound drainage  Mild lumbar radiculopathy L LE  HTN  Obesity  T2 DM  Anxiety  Depression  HLD  DHRUV    PLAN  Patient to be admitted to Dr. Yang  Analgesia prn  Start Neurontin 300 mg BID  DSD as needed  Hospitalist consult for medical management  MRI LS spine with/without contrast in AM  OOB as tolerated  PT consult in AM  DM diet

## 2022-10-21 NOTE — ED PROVIDER NOTE - OBJECTIVE STATEMENT
66 y/o F with PMH of HTN, DM, recent lumbar surgery with Dr. Yang on 10/12/22 presents with worsening low back pain and new sensory deficit in left leg. Pt states she had been taking hydrocodone at home for pain control which is only partially helping. States pain significantly worsened today which prompted ED visit. Denies fever, chills, loss of bowel/bladder control. +constipation. Last bowel movement was yesterday.

## 2022-10-21 NOTE — ED ADULT TRIAGE NOTE - CHIEF COMPLAINT QUOTE
Pt c/o chronic back pain radiating to the L leg worsening since spinal fusion on 10/11 by Dr. Yang at . Last took Hydrocodone at 5pm. 10/10 pain. Denies numbness/tingling.

## 2022-10-21 NOTE — ED PROVIDER NOTE - NS ED ATTENDING STATEMENT MOD
This was a shared visit with the KEATON. I reviewed and verified the documentation and independently performed the documented:

## 2022-10-21 NOTE — ED PROVIDER NOTE - PHYSICAL EXAMINATION
Gen: Well appearing. A&O x3. Obese  HEENT: NC/AT. Dentition in good repair. No oropharyngeal erythema, tonsilar enlargement or exudates. Uvula midline. No submandibular or anterior cervical lymphadenopathy. TM well visualized bilaterally. Nares patent.  Cardiac: s1s2, RRR.  Lungs: CTAB, no chest wall tenderness.  Abdomen: NBS x4, soft, nontender. No CVAT.  MSK: No obvious deformity to extremities. No midline spinal tenderness or tenderness over bony landmarks on extremities. 5/5 upper and lower extremity strength. Full ROM in all extremities  Neuro: CN II-XII intact. Diminished sensation left L5/S1 dermatomes. 5/5 strength in all extremities.  PV: No LE edema or calf tenderness. Distal pulses 2+ in all extremities.

## 2022-10-21 NOTE — ED PROVIDER NOTE - NS ED ROS FT
GEN: Denies fever, chills, sick contacts, recent travel  HEENT: Denies dizziness, blurry vision, HA  Cardiac: Denies CP, SOB, palpitations  Lungs: Denies cough, wheezing  PV: Denies LE swelling  GI: Denies nausea, vomiting, diarrhea, constipation, flank pain  : Denies hematuria, dysuria, frequency, urgency  Skin: Denies rash, redness, open wound  MSK: +pain, decreased ROM  Neuro: +decreased sensation left leg Denies dizziness, difficulty speaking, difficulty swallowing, loss of bowel/bladder control, weakness in extremities

## 2022-10-21 NOTE — H&P ADULT - NSHPLABSRESULTS_GEN_ALL_CORE
LABS                        9.8    9.71  )-----------( 349      ( 21 Oct 2022 20:10 )             31.9     10-21    138  |  106  |  17  ----------------------------<  177<H>  5.0   |  28  |  0.91    Ca    9.5      21 Oct 2022 20:10    TPro  7.2  /  Alb  2.9<L>  /  TBili  0.3  /  DBili  x   /  AST  32  /  ALT  21  /  AlkPhos  38<L>  10-21      STUDIES  Xrays AP/lat LS pine with instrumented fusion L4-S1 with slight protrusion of R L4 screw through superior endplate, (+) crosslink in place, no evidence of failure/fracture    CT scan LS spine  FINDINGS:  The lumbar spine is significant for posterior stabilization at L4, L5 5   and S1. Pedicle screws are present at each level on each side secured by   short vertical rods. There is transverse stabilization at the L5 inferior   endplate level. This hardware appears grossly intact and appropriate in   position, allowing for mild streak artifact partially obscuring the   adjacent structures. The interface between trabecular bone and screw   heads appears appropriate. The posterior paraspinal soft tissues have a   routine postoperative appearance. Induration and scarring is noted   posterior to the L2-S1 vertebra in the subcutaneous tissues. Fusion bone   is greatest to the right of midline appearing to fuse these levels. This   hardware secures grade 1 anterior listhesis L4 on L5. Spacing devices   were introduced into the L4-L5 and L5-S1 disc spaces. These also appear   appropriate in position. The streak artifacts somewhat limits evaluation   of the central canal and canal contents. Allowing for this, the broad for   an L5 laminectomy results in a patulous canal without residual central   canal stenosis. The neural foramina are partly obscured by artifact, with   residual narrowing appearing greatest to the left at L5-S1 at least   mild-to-moderate, lower radiotracer remaining stabilized lumbar neural   foramina.    Lumbar vertebral alignment also demonstrates a shallow idiopathic   levoscoliosis. This vertebral malalignment has an apex near the thoracic   lumbar junction.  Facet joints remain aligned.  Lumbar vertebral body   heights are maintained.   No vertebral fracture is seen.  No destructive   bone lesion is found.  The visualized sacral and pelvic bones appear   intact.    The remaining higher lumbar intervertebral disc spaces demonstrate   degenerative features including marginal osteophytes. There is at least   mild disc height loss at L1-L2. Ventral canal compromise at these higher   lumbar intervertebral disc levels does not appear to be high-grade.   Degenerative foraminal compromise may be greatest at L3-L4 to the left at   least mild-to-moderate, elsewhere low-grade. Throughout the higher lumbar   levels no high-grade central canal compromise is recognized by the CT  technique.

## 2022-10-21 NOTE — H&P ADULT - NSICDXFAMHXNEG_GEN_ALL
Unknown/asthma/atrial fibrillation/cancer/congestive heart failure/COPD/coronary disease/diabetes/heart disease/hypertension/thyroid disease

## 2022-10-21 NOTE — ED ADULT TRIAGE NOTE - NSTRIAGECARE_GEN_A_ER
PACU ANESTHESIA ADMISSION NOTE      Procedure: Excisional biopsy, lymph node, cervical    Post op diagnosis:  Cervical lymphadenopathy      ____  Intubated  TV:______       Rate: ______      FiO2: ______    _x___  Patent Airway    _x___  Full return of protective reflexes    _x___  Full recovery from anesthesia / back to baseline status    Vitals:            T: 97.7               BP : 133/79               R:  16            Sat:  100             P:74      Mental Status:  _x___ Awake   _____ Alert   _____ Drowsy   _____ Sedated    Nausea/Vomiting:  _x___  NO       ______Yes,   See Post - Op Orders         Pain Scale (0-10):  __0___    Treatment: _x___ None    ____ See Post - Op/PCA Orders    Post - Operative Fluids:   __x__ Oral   ____ See Post - Op Orders    Plan: Discharge:   _x___Home       _____Floor     _____Critical Care    _____  Other:_________________    Comments:  No anesthesia issues or complications noted.  Discharge when criteria met. Face Mask

## 2022-10-21 NOTE — ED PROVIDER NOTE - NSICDXPASTSURGICALHX_GEN_ALL_CORE_FT
PAST SURGICAL HISTORY:  S/P      Status post lumbar laminectomy     Status post lumbar spinal fusion

## 2022-10-21 NOTE — ED PROVIDER NOTE - PROGRESS NOTE DETAILS
PAPA [place] to see patient in ED. - Jonel Taylor PA-C PA note appreciated and pt examined. Pt with recent lumbar surgery with Dr. Yang with inc pain and left toes numbness. No fever no incontinence. Daughter states pt had stated pain was 12 out of 10 earlier. Pt alert, heart is reg, lunggs clear abs soft pos bs nte ext ms 5/5 wound  bandage with serosanguinous drainage. Pt seen by PAPA Cooper and dressing changed. Pt to be admitted for pain control and further treatment. Pro MANDUJANO PA note appreciated and pt examined. Pt with recent lumbar surgery with Dr. Yang with inc pain and left toes numbness. No fever no incontinence. Daughter states pt had stated pain was 12 out of 10 earlier. Pt alert, heart is reg, lungs clear abs soft pos bs nte ext ms 5/5 wound  bandage with serosanguinous drainage. Pt seen by PAPA Cooper and dressing changed. Pt to be admitted for pain control and further treatment. Pro MANDUJANO

## 2022-10-21 NOTE — ED PROVIDER NOTE - ATTENDING SHARED VISIT SELECTORS
[facility]    Hospitalist Progress Note    Assessment & Plan   Mr. Jamie Urban is a pleasant 78-year-old gentleman with past medical history of hypertension, dyslipidemia, chronic kidney disease stage 3, COPD, obstructive sleep apnea, hyperparathyroidism, history of Lyme disease a couple of years ago, and recent diagnosis of diverticulitis who came in for evaluation of fevers and generalized weakness.   PLAN:   1. fever,abdominal pain, CT showing colitis  Chlostridium difficile  Colitis-results back on  6/7 , he was treated for diverticulitis just 2 days prior to this admission.  - possibly causing sepsis symptoms   -started on vanco oral 6/7  -GI following   - discharge once diarrhea is better controlled.  2.  Hypertension.    - lisinopril  with holding parameters.   3.  Chronic obstructive pulmonary disease.   - prior to admission inhalers  -DuoNebs p.r.n.   4.  Chronic kidney disease stage 3.   - creatinine varies between 1.3-1.4  -  Will closely monitor his kidney function.   5.  Deep venous thrombosis prophylaxis:  on heparin subcutaneously twice daily.   6.  CODE STATUS: DNR/DNI.     DISPOSITION: possibly 1-2 days more   Griselda Shirley MD  Text Page   (7am to 6pm)    Interval History   Multiple stools since yesterday, 7-8 per day, tired due to that, some cramping with BM , no fever or chills noted.    -Data reviewed today: I reviewed all new labs and imaging results over the last 24 hours. I personally reviewed -CT abdomen, discussed renal calculi and abdominal aortic aneurysm with patient , he is already aware of it from recent CT outpatient and has follow up arranged.    dPhysical Exam   Temp: 98.8  F (37.1  C) Temp src: Oral BP: 110/74   Heart Rate: 90 Resp: 18 SpO2: 95 % O2 Device: BiPAP/CPAP    Vitals:    06/06/18 1534 06/07/18 0517 06/08/18 0113   Weight: 99.8 kg (220 lb) 104.1 kg (229 lb 9.6 oz) 102.2 kg (225 lb 6.4 oz)     Vital Signs with Ranges  Temp:  [98.8  F (37.1  C)-102.2  F  (39  C)] 98.8  F (37.1  C)  Heart Rate:  [90-99] 90  Resp:  [18-20] 18  BP: (110-137)/(64-79) 110/74  SpO2:  [92 %-96 %] 95 %  I/O last 3 completed shifts:  In: 2805 [P.O.:240; I.V.:2565]  Out: 225 [Urine:225]    Constitutional: awake, alert, cooperative, no apparent distress  Respiratory: Clear to auscultation bilaterally, no crackles or wheezing  Cardiovascular: Regular rate and rhythm, normal S1 and S2, and no murmur noted  GI: Normal bowel sounds, soft, non-distended, non-tender  Skin/Integumen: No rashes, no cyanosis, no edema  Neuro : moving all 4 extremities, no focal deficit noted     Medications     sodium chloride 75 mL/hr at 06/08/18 0114       fluticasone-vilanterol  1 puff Inhalation QPM     heparin  5,000 Units Subcutaneous Q12H     lisinopril (PRINIVIL/ZESTRIL) tablet 20 mg  20 mg Oral Daily     sodium chloride (PF)  3 mL Intracatheter Q8H     vancomycin  125 mg Oral 4x Daily       Data     Recent Labs  Lab 06/08/18  0648 06/07/18  0707 06/06/18  1550   WBC 5.0 6.2 6.2   HGB 12.2* 13.2* 15.1   MCV 89 89 87   * 137* 186    140 138   POTASSIUM 4.1 4.1 4.3   CHLORIDE 108 109 107   CO2 23 22 24   BUN 16 16 14   CR 1.62* 1.39* 1.34*   ANIONGAP 7 9 7   IRAIS 7.5* 7.5* 8.5   GLC 84 102* 100*   ALBUMIN  --  2.8* 3.6   PROTTOTAL  --  6.1* 7.8   BILITOTAL  --  1.2 1.0   ALKPHOS  --  75 102   ALT  --  32 45   AST  --  30 46*   TROPI  --   --  <0.015       Recent Labs  Lab 06/08/18  0648 06/07/18  0707 06/06/18  1550   GLC 84 102* 100*       Imaging:   No results found for this or any previous visit (from the past 24 hour(s)).   History/Exam/Medical Decision Making

## 2022-10-21 NOTE — ED ADULT NURSE NOTE - OBJECTIVE STATEMENT
67 year old female found laying on stretcher complaining of extreme back pain after spinal fusion surgery on 10/11.  pt is radiating down her leg.

## 2022-10-21 NOTE — ED PROVIDER NOTE - CARE PLAN
Principal Discharge DX:	Drainage from wound   1 Principal Discharge DX:	Drainage from wound  Secondary Diagnosis:	Severe back pain

## 2022-10-21 NOTE — H&P ADULT - HISTORY OF PRESENT ILLNESS
68 y/o F with PMHx type 2 DM, HTN, DHRUV, anxiety/depression and HLD presents emergently having developed wound drainage and paresthesia L foot. Patient underwent lumbar lami/fusion/TLIF L4-S1 with excision L L4/5 & L5/S1 extraforaminal HNP on 10/11/22. She required postop intubation for acute respiratory distress and was extubated the following morning. Patient was discharged home on 10/17/22 without complaints of radicular pain. This week she has noticed wound drainage withouit evidence of fever/chills and then developed increased drainage earlier to day as well as paresthesia in her L foot. Patient denies weakness LEs or change in bowel/bladder function, HA, saddle paresthesia.

## 2022-10-22 PROBLEM — Z91.89 OTHER SPECIFIED PERSONAL RISK FACTORS, NOT ELSEWHERE CLASSIFIED: Chronic | Status: ACTIVE | Noted: 2022-10-03

## 2022-10-22 PROBLEM — M54.50 LOW BACK PAIN, UNSPECIFIED: Chronic | Status: ACTIVE | Noted: 2022-10-03

## 2022-10-22 PROBLEM — F41.9 ANXIETY DISORDER, UNSPECIFIED: Chronic | Status: ACTIVE | Noted: 2022-10-03

## 2022-10-22 PROBLEM — I10 ESSENTIAL (PRIMARY) HYPERTENSION: Chronic | Status: ACTIVE | Noted: 2022-10-03

## 2022-10-22 PROBLEM — E78.5 HYPERLIPIDEMIA, UNSPECIFIED: Chronic | Status: ACTIVE | Noted: 2022-10-03

## 2022-10-22 PROBLEM — E11.9 TYPE 2 DIABETES MELLITUS WITHOUT COMPLICATIONS: Chronic | Status: ACTIVE | Noted: 2022-10-03

## 2022-10-22 PROCEDURE — 72148 MRI LUMBAR SPINE W/O DYE: CPT | Mod: 26,52

## 2022-10-22 PROCEDURE — 99221 1ST HOSP IP/OBS SF/LOW 40: CPT

## 2022-10-22 RX ORDER — ONDANSETRON 8 MG/1
4 TABLET, FILM COATED ORAL EVERY 8 HOURS
Refills: 0 | Status: DISCONTINUED | OUTPATIENT
Start: 2022-10-22 | End: 2022-10-24

## 2022-10-22 RX ORDER — INSULIN GLARGINE 100 [IU]/ML
15 INJECTION, SOLUTION SUBCUTANEOUS AT BEDTIME
Refills: 0 | Status: DISCONTINUED | OUTPATIENT
Start: 2022-10-22 | End: 2022-10-23

## 2022-10-22 RX ORDER — KETOROLAC TROMETHAMINE 30 MG/ML
30 SYRINGE (ML) INJECTION ONCE
Refills: 0 | Status: DISCONTINUED | OUTPATIENT
Start: 2022-10-22 | End: 2022-10-22

## 2022-10-22 RX ORDER — POLYETHYLENE GLYCOL 3350 17 G/17G
17 POWDER, FOR SOLUTION ORAL DAILY
Refills: 0 | Status: DISCONTINUED | OUTPATIENT
Start: 2022-10-22 | End: 2022-10-24

## 2022-10-22 RX ORDER — OXYCODONE HYDROCHLORIDE 5 MG/1
15 TABLET ORAL EVERY 4 HOURS
Refills: 0 | Status: DISCONTINUED | OUTPATIENT
Start: 2022-10-22 | End: 2022-10-24

## 2022-10-22 RX ORDER — DEXAMETHASONE 0.5 MG/5ML
10 ELIXIR ORAL ONCE
Refills: 0 | Status: COMPLETED | OUTPATIENT
Start: 2022-10-22 | End: 2022-10-22

## 2022-10-22 RX ORDER — HYDROMORPHONE HYDROCHLORIDE 2 MG/ML
1 INJECTION INTRAMUSCULAR; INTRAVENOUS; SUBCUTANEOUS EVERY 4 HOURS
Refills: 0 | Status: DISCONTINUED | OUTPATIENT
Start: 2022-10-22 | End: 2022-10-22

## 2022-10-22 RX ORDER — INSULIN LISPRO 100/ML
VIAL (ML) SUBCUTANEOUS
Refills: 0 | Status: DISCONTINUED | OUTPATIENT
Start: 2022-10-22 | End: 2022-10-24

## 2022-10-22 RX ORDER — GABAPENTIN 400 MG/1
300 CAPSULE ORAL
Refills: 0 | Status: DISCONTINUED | OUTPATIENT
Start: 2022-10-22 | End: 2022-10-22

## 2022-10-22 RX ORDER — DEXAMETHASONE 0.5 MG/5ML
6 ELIXIR ORAL EVERY 6 HOURS
Refills: 0 | Status: COMPLETED | OUTPATIENT
Start: 2022-10-22 | End: 2022-10-22

## 2022-10-22 RX ORDER — ACETAMINOPHEN 500 MG
650 TABLET ORAL EVERY 6 HOURS
Refills: 0 | Status: DISCONTINUED | OUTPATIENT
Start: 2022-10-22 | End: 2022-10-24

## 2022-10-22 RX ORDER — ENOXAPARIN SODIUM 100 MG/ML
40 INJECTION SUBCUTANEOUS EVERY 12 HOURS
Refills: 0 | Status: DISCONTINUED | OUTPATIENT
Start: 2022-10-22 | End: 2022-10-24

## 2022-10-22 RX ORDER — SENNA PLUS 8.6 MG/1
2 TABLET ORAL AT BEDTIME
Refills: 0 | Status: DISCONTINUED | OUTPATIENT
Start: 2022-10-22 | End: 2022-10-24

## 2022-10-22 RX ORDER — OXYCODONE HYDROCHLORIDE 5 MG/1
5 TABLET ORAL EVERY 4 HOURS
Refills: 0 | Status: DISCONTINUED | OUTPATIENT
Start: 2022-10-22 | End: 2022-10-24

## 2022-10-22 RX ORDER — INSULIN LISPRO 100/ML
4 VIAL (ML) SUBCUTANEOUS
Refills: 0 | Status: DISCONTINUED | OUTPATIENT
Start: 2022-10-22 | End: 2022-10-23

## 2022-10-22 RX ORDER — GABAPENTIN 400 MG/1
400 CAPSULE ORAL THREE TIMES A DAY
Refills: 0 | Status: DISCONTINUED | OUTPATIENT
Start: 2022-10-22 | End: 2022-10-23

## 2022-10-22 RX ORDER — OXYCODONE HYDROCHLORIDE 5 MG/1
10 TABLET ORAL EVERY 4 HOURS
Refills: 0 | Status: DISCONTINUED | OUTPATIENT
Start: 2022-10-22 | End: 2022-10-24

## 2022-10-22 RX ADMIN — Medication 650 MILLIGRAM(S): at 02:18

## 2022-10-22 RX ADMIN — Medication 100 MILLIGRAM(S): at 10:07

## 2022-10-22 RX ADMIN — OXYCODONE HYDROCHLORIDE 15 MILLIGRAM(S): 5 TABLET ORAL at 10:36

## 2022-10-22 RX ADMIN — OXYCODONE HYDROCHLORIDE 15 MILLIGRAM(S): 5 TABLET ORAL at 15:46

## 2022-10-22 RX ADMIN — Medication 4 UNIT(S): at 17:21

## 2022-10-22 RX ADMIN — Medication 102 MILLIGRAM(S): at 11:20

## 2022-10-22 RX ADMIN — ENOXAPARIN SODIUM 40 MILLIGRAM(S): 100 INJECTION SUBCUTANEOUS at 10:06

## 2022-10-22 RX ADMIN — LIDOCAINE 1 PATCH: 4 CREAM TOPICAL at 18:53

## 2022-10-22 RX ADMIN — SERTRALINE 50 MILLIGRAM(S): 25 TABLET, FILM COATED ORAL at 00:16

## 2022-10-22 RX ADMIN — HYDROMORPHONE HYDROCHLORIDE 1 MILLIGRAM(S): 2 INJECTION INTRAMUSCULAR; INTRAVENOUS; SUBCUTANEOUS at 04:18

## 2022-10-22 RX ADMIN — OXYCODONE HYDROCHLORIDE 15 MILLIGRAM(S): 5 TABLET ORAL at 21:14

## 2022-10-22 RX ADMIN — Medication 5 MILLIGRAM(S): at 10:07

## 2022-10-22 RX ADMIN — OXYCODONE HYDROCHLORIDE 15 MILLIGRAM(S): 5 TABLET ORAL at 16:16

## 2022-10-22 RX ADMIN — ENOXAPARIN SODIUM 40 MILLIGRAM(S): 100 INJECTION SUBCUTANEOUS at 21:15

## 2022-10-22 RX ADMIN — LIDOCAINE 1 PATCH: 4 CREAM TOPICAL at 09:00

## 2022-10-22 RX ADMIN — HYDROMORPHONE HYDROCHLORIDE 1 MILLIGRAM(S): 2 INJECTION INTRAMUSCULAR; INTRAVENOUS; SUBCUTANEOUS at 08:47

## 2022-10-22 RX ADMIN — HYDROMORPHONE HYDROCHLORIDE 1 MILLIGRAM(S): 2 INJECTION INTRAMUSCULAR; INTRAVENOUS; SUBCUTANEOUS at 13:04

## 2022-10-22 RX ADMIN — GABAPENTIN 400 MILLIGRAM(S): 400 CAPSULE ORAL at 21:13

## 2022-10-22 RX ADMIN — ZOLPIDEM TARTRATE 5 MILLIGRAM(S): 10 TABLET ORAL at 02:18

## 2022-10-22 RX ADMIN — ZOLPIDEM TARTRATE 5 MILLIGRAM(S): 10 TABLET ORAL at 00:12

## 2022-10-22 RX ADMIN — Medication 30 MILLIGRAM(S): at 11:49

## 2022-10-22 RX ADMIN — SERTRALINE 200 MILLIGRAM(S): 25 TABLET, FILM COATED ORAL at 00:15

## 2022-10-22 RX ADMIN — MIRTAZAPINE 15 MILLIGRAM(S): 45 TABLET, ORALLY DISINTEGRATING ORAL at 00:15

## 2022-10-22 RX ADMIN — INSULIN GLARGINE 15 UNIT(S): 100 INJECTION, SOLUTION SUBCUTANEOUS at 21:15

## 2022-10-22 RX ADMIN — GABAPENTIN 400 MILLIGRAM(S): 400 CAPSULE ORAL at 15:44

## 2022-10-22 RX ADMIN — OXYCODONE HYDROCHLORIDE 15 MILLIGRAM(S): 5 TABLET ORAL at 21:44

## 2022-10-22 RX ADMIN — Medication 6 MILLIGRAM(S): at 23:48

## 2022-10-22 RX ADMIN — Medication 8: at 17:22

## 2022-10-22 RX ADMIN — GABAPENTIN 300 MILLIGRAM(S): 400 CAPSULE ORAL at 10:06

## 2022-10-22 RX ADMIN — HYDROMORPHONE HYDROCHLORIDE 1 MILLIGRAM(S): 2 INJECTION INTRAMUSCULAR; INTRAVENOUS; SUBCUTANEOUS at 03:48

## 2022-10-22 RX ADMIN — HYDROMORPHONE HYDROCHLORIDE 1 MILLIGRAM(S): 2 INJECTION INTRAMUSCULAR; INTRAVENOUS; SUBCUTANEOUS at 08:17

## 2022-10-22 RX ADMIN — OXYCODONE HYDROCHLORIDE 15 MILLIGRAM(S): 5 TABLET ORAL at 02:00

## 2022-10-22 RX ADMIN — Medication 30 MILLIGRAM(S): at 11:19

## 2022-10-22 RX ADMIN — Medication 650 MILLIGRAM(S): at 02:48

## 2022-10-22 RX ADMIN — Medication 145 MILLIGRAM(S): at 10:08

## 2022-10-22 RX ADMIN — HYDROMORPHONE HYDROCHLORIDE 1 MILLIGRAM(S): 2 INJECTION INTRAMUSCULAR; INTRAVENOUS; SUBCUTANEOUS at 12:34

## 2022-10-22 RX ADMIN — Medication 6 MILLIGRAM(S): at 18:32

## 2022-10-22 RX ADMIN — Medication 4: at 21:16

## 2022-10-22 RX ADMIN — OXYCODONE HYDROCHLORIDE 15 MILLIGRAM(S): 5 TABLET ORAL at 02:30

## 2022-10-22 RX ADMIN — ATORVASTATIN CALCIUM 40 MILLIGRAM(S): 80 TABLET, FILM COATED ORAL at 00:15

## 2022-10-22 RX ADMIN — METFORMIN HYDROCHLORIDE 1000 MILLIGRAM(S): 850 TABLET ORAL at 10:07

## 2022-10-22 RX ADMIN — OXYCODONE HYDROCHLORIDE 15 MILLIGRAM(S): 5 TABLET ORAL at 10:06

## 2022-10-22 NOTE — CONSULT NOTE ADULT - SUBJECTIVE AND OBJECTIVE BOX
c/c: severe back/LE pain    HPI: 67F,  PMHx type 2 DM, HTN, and HLD, recently admitted for elective lumbar laminectomy/fusion complicated by hypercarbic resp failure post op requiring intubation/icu stay, ultimately stabilized, discharged 10/17, who presented to ED with wound drainage, parasthesias left foot and worsening pain.   She underwent a CT which showed post op changes and is admitted to spine. Hospitalist service consulted for diabetes management    pt seen and examined this am. c/o severe uncontrolled pain in bilateral LE's.   Awaiting MRI to evaluate fluid collection. no sob/chest pain. NO difficulty voiding. no n/v. no f/c/r.     ROS: all 10 systems reviewed and is as above otherwise negative.     PMH: as above  PSH: , lumbar lami/fusion.   F/H: no signicant medical conditions in immediate family members  Social: no etoh, tobacco-40 pack year  Allergies: NKDA  Home meds: see med rec    Vital Signs Last 24 Hrs  T(C): 36.6 (10-22-22 @ 08:36), Max: 37.2 (10-21-22 @ 19:18)  T(F): 97.9 (10-22-22 @ 08:36), Max: 99 (10-21- @ 19:18)  HR: 67 (10-22-22 @ 08:36) (61 - 72)  BP: 144/90 (10-22-22 @ 08:36) (141/49 - 151/70)  RR: 18 (10-22-22 @ 08:36) (18 - 18)  SpO2: 95% (10-22-22 @ 08:36) (95% - 98%)    PHYSICAL EXAM:    GENERAL: writhing in pain, laying in left lateral position.  HEAD:  Normocephalic, atraumatic  EYES: EOMI, PERRLA  HEENT: Moist mucous membranes  NECK: Supple, No JVD  NERVOUS SYSTEM:  Alert & Oriented X3, non focal.   CHEST/LUNG: Clear to auscultation bilaterally  HEART: Regular rate and rhythm  ABDOMEN: Soft, Nontender, Nondistended, Bowel sounds present  GENITOURINARY: Voiding, no palpable bladder  EXTREMITIES:   No clubbing, cyanosis, or edema  MUSCULOSKELETAL- dressing over back d/c/i, no muscle tenderness  SKIN-no rash      LABS:                        9.8    9.71  )-----------( 349      ( 21 Oct 2022 20:10 )             31.9     10-    138  |  106  |  17  ----------------------------<  177<H>  5.0   |  28  |  0.91    Ca    9.5      21 Oct 2022 20:10    TPro  7.2  /  Alb  2.9<L>  /  TBili  0.3  /  DBili  x   /  AST  32  /  ALT  21  /  AlkPhos  38<L>  10-      Urinalysis Basic - ( 21 Oct 2022 19:59 )    Color: Yellow / Appearance: Clear / S.015 / pH: x  Gluc: x / Ketone: Negative  / Bili: Negative / Urobili: Negative   Blood: x / Protein: 15 / Nitrite: Negative   Leuk Esterase: Small / RBC: 3-5 /HPF / WBC 6-10   Sq Epi: x / Non Sq Epi: Moderate / Bacteria: Few      CT Lumbar Spine No Cont (10.21.22 @ 20:35) >  IMPRESSION:    1. No vertebral fracture is recognized.    2. Posterior stabilization L4-S1 secures grade 1 anterior listhesis L4 on   L5 with routine postoperative appearance    3. Direct comparison to prior lumbar CT or MR imaging will benefit this   evaluation.  When these examinations become available, an addendum will   be provided as appropriate.    MEDICATIONS  (STANDING):  atorvastatin 40 milliGRAM(s) Oral at bedtime  enalapril 5 milliGRAM(s) Oral daily  enoxaparin Injectable 40 milliGRAM(s) SubCutaneous every 12 hours  fenofibrate Tablet 145 milliGRAM(s) Oral daily  gabapentin 400 milliGRAM(s) Oral three times a day  glimepiride. 2 milliGRAM(s) Oral daily  metFORMIN 1000 milliGRAM(s) Oral daily  metoprolol succinate  milliGRAM(s) Oral daily  mirtazapine 15 milliGRAM(s) Oral at bedtime  sertraline 200 milliGRAM(s) Oral daily  sertraline 50 milliGRAM(s) Oral daily    MEDICATIONS  (PRN):  acetaminophen     Tablet .. 650 milliGRAM(s) Oral every 6 hours PRN Temp greater or equal to 38C (100.4F), Mild Pain (1 - 3)  aluminum hydroxide/magnesium hydroxide/simethicone Suspension 30 milliLiter(s) Oral every 4 hours PRN Dyspepsia  ondansetron Injectable 4 milliGRAM(s) IV Push every 8 hours PRN Nausea and/or Vomiting  oxyCODONE    IR 5 milliGRAM(s) Oral every 4 hours PRN Mild Pain (1 - 3)  oxyCODONE    IR 10 milliGRAM(s) Oral every 4 hours PRN Moderate Pain (4 - 6)  oxyCODONE    IR 15 milliGRAM(s) Oral every 4 hours PRN Severe Pain (7 - 10)  zolpidem 5 milliGRAM(s) Oral at bedtime PRN Insomnia  zolpidem 5 milliGRAM(s) Oral at bedtime PRN Insomnia        ASSESSMENT AND PLAN:  67f, PMH as above a/w    1. Recent lumbar lami with possible post op fluid collection:  -admitted to spine.   -pain control, try iv toradol.   -prn dilaudid/oxycodone.   -iv steroids.  -muscle relaxants  -incentive spirometry  -bowel regimen, start miralax/senna    2. DMII  -will likely be uncontrolled with steroids.   -dc ohas  -start lantus/premeal insulin.   -ss  -will titrate doses based on sugars.     3. HTN:  -continue bb, ace-i    4. HLD:  -statin    5. Anxiety/depression.   -mirtazipine/sertraline.     6. DVT px:  -per primary team.    thanks, will follow.      c/c: severe back/LE pain    HPI: 67F,  PMHx type 2 DM, HTN, HLD, anxiety/depression,  recently admitted for elective lumbar laminectomy/fusion complicated by hypercarbic resp failure post op requiring intubation/icu stay, ultimately stabilized, discharged 10/17, who presented to ED with wound drainage, parasthesias left foot and worsening pain.   She underwent a CT which showed post op changes and is admitted to spine. Hospitalist service consulted for diabetes management    pt seen and examined this am. c/o severe uncontrolled pain in bilateral LE's.   Awaiting MRI to evaluate fluid collection. no sob/chest pain. NO difficulty voiding. no n/v. no f/c/r.     ROS: all 10 systems reviewed and is as above otherwise negative.     PMH: as above  PSH: , lumbar lami/fusion.   F/H: no signicant medical conditions in immediate family members  Social: no etoh, tobacco-40 pack year  Allergies: NKDA  Home meds: see med rec    Vital Signs Last 24 Hrs  T(C): 36.6 (10-22-22 @ 08:36), Max: 37.2 (10-21-22 @ 19:18)  T(F): 97.9 (10-22-22 @ 08:36), Max: 99 (10-21- @ 19:18)  HR: 67 (10-22-22 @ 08:36) (61 - 72)  BP: 144/90 (10-22-22 @ 08:36) (141/49 - 151/70)  RR: 18 (10-22-22 @ 08:36) (18 - 18)  SpO2: 95% (10-22-22 @ 08:36) (95% - 98%)    PHYSICAL EXAM:    GENERAL: writhing in pain, laying in left lateral position.  HEAD:  Normocephalic, atraumatic  EYES: EOMI, PERRLA  HEENT: Moist mucous membranes  NECK: Supple, No JVD  NERVOUS SYSTEM:  Alert & Oriented X3, non focal.   CHEST/LUNG: Clear to auscultation bilaterally  HEART: Regular rate and rhythm  ABDOMEN: Soft, Nontender, Nondistended, Bowel sounds present  GENITOURINARY: Voiding, no palpable bladder  EXTREMITIES:   No clubbing, cyanosis, or edema  MUSCULOSKELETAL- dressing over back d/c/i, no muscle tenderness  SKIN-no rash      LABS:                        9.8    9.71  )-----------( 349      ( 21 Oct 2022 20:10 )             31.9     10-    138  |  106  |  17  ----------------------------<  177<H>  5.0   |  28  |  0.91    Ca    9.5      21 Oct 2022 20:10    TPro  7.2  /  Alb  2.9<L>  /  TBili  0.3  /  DBili  x   /  AST  32  /  ALT  21  /  AlkPhos  38<L>  10-      Urinalysis Basic - ( 21 Oct 2022 19:59 )    Color: Yellow / Appearance: Clear / S.015 / pH: x  Gluc: x / Ketone: Negative  / Bili: Negative / Urobili: Negative   Blood: x / Protein: 15 / Nitrite: Negative   Leuk Esterase: Small / RBC: 3-5 /HPF / WBC 6-10   Sq Epi: x / Non Sq Epi: Moderate / Bacteria: Few      CT Lumbar Spine No Cont (10.21. @ 20:35) >  IMPRESSION:    1. No vertebral fracture is recognized.    2. Posterior stabilization L4-S1 secures grade 1 anterior listhesis L4 on   L5 with routine postoperative appearance    3. Direct comparison to prior lumbar CT or MR imaging will benefit this   evaluation.  When these examinations become available, an addendum will   be provided as appropriate.    MEDICATIONS  (STANDING):  atorvastatin 40 milliGRAM(s) Oral at bedtime  enalapril 5 milliGRAM(s) Oral daily  enoxaparin Injectable 40 milliGRAM(s) SubCutaneous every 12 hours  fenofibrate Tablet 145 milliGRAM(s) Oral daily  gabapentin 400 milliGRAM(s) Oral three times a day  glimepiride. 2 milliGRAM(s) Oral daily  metFORMIN 1000 milliGRAM(s) Oral daily  metoprolol succinate  milliGRAM(s) Oral daily  mirtazapine 15 milliGRAM(s) Oral at bedtime  sertraline 200 milliGRAM(s) Oral daily  sertraline 50 milliGRAM(s) Oral daily    MEDICATIONS  (PRN):  acetaminophen     Tablet .. 650 milliGRAM(s) Oral every 6 hours PRN Temp greater or equal to 38C (100.4F), Mild Pain (1 - 3)  aluminum hydroxide/magnesium hydroxide/simethicone Suspension 30 milliLiter(s) Oral every 4 hours PRN Dyspepsia  ondansetron Injectable 4 milliGRAM(s) IV Push every 8 hours PRN Nausea and/or Vomiting  oxyCODONE    IR 5 milliGRAM(s) Oral every 4 hours PRN Mild Pain (1 - 3)  oxyCODONE    IR 10 milliGRAM(s) Oral every 4 hours PRN Moderate Pain (4 - 6)  oxyCODONE    IR 15 milliGRAM(s) Oral every 4 hours PRN Severe Pain (7 - 10)  zolpidem 5 milliGRAM(s) Oral at bedtime PRN Insomnia  zolpidem 5 milliGRAM(s) Oral at bedtime PRN Insomnia        ASSESSMENT AND PLAN:  67f, PMH as above a/w    1. Recent lumbar lami with possible post op fluid collection:  -admitted to spine.   -pain control, try iv toradol.   -prn dilaudid/oxycodone.   -iv steroids.  -muscle relaxants  -incentive spirometry  -bowel regimen, start miralax/senna    2. DMII  -will likely be uncontrolled with steroids.   -dc ohas  -start lantus/premeal insulin.   -ss  -will titrate doses based on sugars.     3. HTN:  -continue bb, ace-i    4. HLD:  -statin    5. Anxiety/depression.   -mirtazipine/sertraline.     6. DVT px:  -per primary team.    thanks, will follow.

## 2022-10-22 NOTE — PHYSICAL THERAPY INITIAL EVALUATION ADULT - PERTINENT HX OF CURRENT PROBLEM, REHAB EVAL
Pt was recently admitted for elective lumbar laminectomy/fusion complicated by hypercarbic resp failure post op requiring intubation/icu stay, ultimately stabilized, discharged 10/17, who presented to ED with wound drainage, paresthesias left foot and worsening pain. c/o severe uncontrolled pain in bilateral LE's. Awaiting MRI to evaluate fluid collection.

## 2022-10-22 NOTE — PHYSICAL THERAPY INITIAL EVALUATION ADULT - GENERAL OBSERVATIONS, REHAB EVAL
Pt was found sitting at eob eager to use BR, refusing bed pan, c/o left foot toes numbness, willing to amb with PT

## 2022-10-22 NOTE — PATIENT PROFILE ADULT - FUNCTIONAL ASSESSMENT - DAILY ACTIVITY 5.
[facility]  Obstetrics and Gynecology    Routine OB Prenatal Visit    Subjective:  Madalyn Todd is a 27 year old  at 28w5d who presents today for a routine prenatal visit. She denies Ucx, or LOF, or VB. She confirms normal FM.  Vinton Ortiz few times - have not been painful other than on 3/2 - was seen in the office and cervix was closed.   Removed nipple piecring, noted \"growth on left nipple\" and has noted a little bit of nipple discharge.  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      Objective:  Vitals:    21 1414   BP: 100/60   BP Location: LUE - Left upper extremity   Patient Position: Sitting   Cuff Size: Regular   Pulse: 83   Temp: 97.8 °F (36.6 °C)   TempSrc: Temporal   Weight: 69.9 kg (154 lb)   LMP: 2020       Fundal Height: 29 cm  FHTs: 135 BPM    Breast exam: normal appearing bilateral nipples, no signs of infection or abscess  On left breast, some hyperkeratotic tissue likely just related to healing from piercing being removed    A/P: Madalyn Todd is a 27 year old  at 28w5d who presents today for a routine prenatal visit, she is doing well.     TDaP given today  Patient is performed 28 weeks lab collected  Discussed PTL and kick count precautions  ASHLEY in 2 weeks    Juani Meehan MD    
2 = A lot of assistance

## 2022-10-22 NOTE — PATIENT PROFILE ADULT - FUNCTIONAL ASSESSMENT - BASIC MOBILITY 6.
4-calculated by average/Not able to assess (calculate score using [facility] averaging method)

## 2022-10-23 PROCEDURE — 99232 SBSQ HOSP IP/OBS MODERATE 35: CPT

## 2022-10-23 RX ORDER — DEXAMETHASONE 0.5 MG/5ML
2 ELIXIR ORAL EVERY 6 HOURS
Refills: 0 | Status: COMPLETED | OUTPATIENT
Start: 2022-10-23 | End: 2022-10-23

## 2022-10-23 RX ORDER — INSULIN GLARGINE 100 [IU]/ML
17 INJECTION, SOLUTION SUBCUTANEOUS AT BEDTIME
Refills: 0 | Status: DISCONTINUED | OUTPATIENT
Start: 2022-10-23 | End: 2022-10-24

## 2022-10-23 RX ORDER — GABAPENTIN 400 MG/1
300 CAPSULE ORAL THREE TIMES A DAY
Refills: 0 | Status: DISCONTINUED | OUTPATIENT
Start: 2022-10-23 | End: 2022-10-24

## 2022-10-23 RX ORDER — DEXAMETHASONE 0.5 MG/5ML
4 ELIXIR ORAL ONCE
Refills: 0 | Status: COMPLETED | OUTPATIENT
Start: 2022-10-23 | End: 2022-10-23

## 2022-10-23 RX ORDER — ACETAMINOPHEN 500 MG
1000 TABLET ORAL ONCE
Refills: 0 | Status: COMPLETED | OUTPATIENT
Start: 2022-10-23 | End: 2022-10-23

## 2022-10-23 RX ORDER — INSULIN LISPRO 100/ML
6 VIAL (ML) SUBCUTANEOUS
Refills: 0 | Status: DISCONTINUED | OUTPATIENT
Start: 2022-10-23 | End: 2022-10-24

## 2022-10-23 RX ORDER — ALPRAZOLAM 0.25 MG
0.5 TABLET ORAL ONCE
Refills: 0 | Status: DISCONTINUED | OUTPATIENT
Start: 2022-10-23 | End: 2022-10-23

## 2022-10-23 RX ADMIN — ATORVASTATIN CALCIUM 40 MILLIGRAM(S): 80 TABLET, FILM COATED ORAL at 21:40

## 2022-10-23 RX ADMIN — Medication 4 MILLIGRAM(S): at 11:58

## 2022-10-23 RX ADMIN — Medication 6 UNIT(S): at 13:16

## 2022-10-23 RX ADMIN — Medication 400 MILLIGRAM(S): at 17:47

## 2022-10-23 RX ADMIN — SERTRALINE 50 MILLIGRAM(S): 25 TABLET, FILM COATED ORAL at 14:09

## 2022-10-23 RX ADMIN — ENOXAPARIN SODIUM 40 MILLIGRAM(S): 100 INJECTION SUBCUTANEOUS at 09:09

## 2022-10-23 RX ADMIN — POLYETHYLENE GLYCOL 3350 17 GRAM(S): 17 POWDER, FOR SOLUTION ORAL at 09:09

## 2022-10-23 RX ADMIN — Medication 4 MILLIGRAM(S): at 05:00

## 2022-10-23 RX ADMIN — OXYCODONE HYDROCHLORIDE 15 MILLIGRAM(S): 5 TABLET ORAL at 21:40

## 2022-10-23 RX ADMIN — OXYCODONE HYDROCHLORIDE 15 MILLIGRAM(S): 5 TABLET ORAL at 09:39

## 2022-10-23 RX ADMIN — OXYCODONE HYDROCHLORIDE 15 MILLIGRAM(S): 5 TABLET ORAL at 09:09

## 2022-10-23 RX ADMIN — OXYCODONE HYDROCHLORIDE 15 MILLIGRAM(S): 5 TABLET ORAL at 03:15

## 2022-10-23 RX ADMIN — Medication 8: at 21:39

## 2022-10-23 RX ADMIN — GABAPENTIN 400 MILLIGRAM(S): 400 CAPSULE ORAL at 05:00

## 2022-10-23 RX ADMIN — GABAPENTIN 300 MILLIGRAM(S): 400 CAPSULE ORAL at 14:09

## 2022-10-23 RX ADMIN — SENNA PLUS 2 TABLET(S): 8.6 TABLET ORAL at 21:40

## 2022-10-23 RX ADMIN — ZOLPIDEM TARTRATE 5 MILLIGRAM(S): 10 TABLET ORAL at 23:14

## 2022-10-23 RX ADMIN — GABAPENTIN 300 MILLIGRAM(S): 400 CAPSULE ORAL at 21:39

## 2022-10-23 RX ADMIN — MIRTAZAPINE 15 MILLIGRAM(S): 45 TABLET, ORALLY DISINTEGRATING ORAL at 21:39

## 2022-10-23 RX ADMIN — OXYCODONE HYDROCHLORIDE 15 MILLIGRAM(S): 5 TABLET ORAL at 02:45

## 2022-10-23 RX ADMIN — Medication 100 MILLIGRAM(S): at 09:09

## 2022-10-23 RX ADMIN — Medication 2: at 13:15

## 2022-10-23 RX ADMIN — Medication 2 MILLIGRAM(S): at 23:15

## 2022-10-23 RX ADMIN — OXYCODONE HYDROCHLORIDE 15 MILLIGRAM(S): 5 TABLET ORAL at 14:09

## 2022-10-23 RX ADMIN — Medication 5 MILLIGRAM(S): at 09:10

## 2022-10-23 RX ADMIN — Medication 145 MILLIGRAM(S): at 09:10

## 2022-10-23 RX ADMIN — Medication 6 UNIT(S): at 17:47

## 2022-10-23 RX ADMIN — INSULIN GLARGINE 17 UNIT(S): 100 INJECTION, SOLUTION SUBCUTANEOUS at 21:38

## 2022-10-23 RX ADMIN — Medication 8: at 17:46

## 2022-10-23 RX ADMIN — Medication 0.5 MILLIGRAM(S): at 11:57

## 2022-10-23 RX ADMIN — ENOXAPARIN SODIUM 40 MILLIGRAM(S): 100 INJECTION SUBCUTANEOUS at 21:40

## 2022-10-23 RX ADMIN — Medication 4: at 09:10

## 2022-10-23 RX ADMIN — Medication 2 MILLIGRAM(S): at 17:48

## 2022-10-23 RX ADMIN — OXYCODONE HYDROCHLORIDE 15 MILLIGRAM(S): 5 TABLET ORAL at 14:39

## 2022-10-23 RX ADMIN — SERTRALINE 200 MILLIGRAM(S): 25 TABLET, FILM COATED ORAL at 14:09

## 2022-10-23 RX ADMIN — Medication 1000 MILLIGRAM(S): at 18:17

## 2022-10-23 RX ADMIN — OXYCODONE HYDROCHLORIDE 15 MILLIGRAM(S): 5 TABLET ORAL at 22:10

## 2022-10-23 RX ADMIN — Medication 4 UNIT(S): at 09:10

## 2022-10-23 NOTE — PROGRESS NOTE ADULT - ASSESSMENT
66 y/o F with PMHx type 2 DM, HTN, DHRUV, anxiety/depression and HLD presents emergently having developed wound drainage and paresthesia L foot. Patient underwent lumbar lami/fusion/TLIF L4-S1 with excision L L4/5 & L5/S1 extraforaminal HNP on 10/11/22. She required postop intubation for acute respiratory distress and was extubated the following morning. Patient was discharged home on 10/17/22 without complaints of radicular pain. This week she has noticed wound drainage withouit evidence of fever/chills and then developed increased drainage earlier to day as well as paresthesia in her L foot. Patient denies weakness LEs or change in bowel/bladder function, HA, saddle paresthesia.    IMP:  Post alminectomy syndrome  Possible post op seroma  Wound drainage  Mild lumbar radiculopathy L LE  HTN  Obesity  T2 DM  Anxiety  Depression  HLD  DHRUV    PLAN  Patient to be admitted to Dr. Yang  Analgesia prn  Decadron 10 mg now-may continue pending MRI results  Increase Neurontin 400 mg tID  DSD as needed  Hospitalist consult for medical management  Awaiting MRI LS spine with/without contrast today  OOB as tolerated  PT consult  DM diet
68 y/o F with PMHx type 2 DM, HTN, DHRUV, anxiety/depression and HLD presents emergently having developed wound drainage and paresthesia L foot. Patient underwent lumbar lami/fusion/TLIF L4-S1 with excision L L4/5 & L5/S1 extraforaminal HNP on 10/11/22. She required postop intubation for acute respiratory distress and was extubated the following morning. Patient was discharged home on 10/17/22 without complaints of radicular pain. This week she has noticed wound drainage withouit evidence of fever/chills and then developed increased drainage earlier to day as well as paresthesia in her L foot. Patient denies weakness LEs or change in bowel/bladder function, HA, saddle paresthesia.    IMP:  Post laminectomy syndrome  Post op seroma  Wound drainage  Mild lumbar radiculopathy L LE  HTN  Obesity  T2 DM  Anxiety  Depression  HLD  DHRUV    PLAN  Patient to be admitted to Dr. Yang  Analgesia prn  Decadron taper continues  Decrease Neurontin 300 mg tID  DSD as needed  Hospitalist consult for medical management  Will try to complete MRI LS spine today  OOB as tolerated  PT consult  DM diet

## 2022-10-23 NOTE — PROGRESS NOTE ADULT - TIME BILLING
Case discussed with patient, daughter, nursing, Dr. Miller, Pro Lobo
Case discussed with patient,, nursing, Dr. Paul Dr, Patel

## 2022-10-24 ENCOUNTER — TRANSCRIPTION ENCOUNTER (OUTPATIENT)
Age: 67
End: 2022-10-24

## 2022-10-24 VITALS
TEMPERATURE: 98 F | SYSTOLIC BLOOD PRESSURE: 161 MMHG | DIASTOLIC BLOOD PRESSURE: 63 MMHG | HEART RATE: 51 BPM | OXYGEN SATURATION: 94 % | RESPIRATION RATE: 18 BRPM

## 2022-10-24 LAB
CULTURE RESULTS: SIGNIFICANT CHANGE UP
SPECIMEN SOURCE: SIGNIFICANT CHANGE UP

## 2022-10-24 PROCEDURE — 99232 SBSQ HOSP IP/OBS MODERATE 35: CPT

## 2022-10-24 RX ORDER — INSULIN LISPRO 100/ML
VIAL (ML) SUBCUTANEOUS AT BEDTIME
Refills: 0 | Status: DISCONTINUED | OUTPATIENT
Start: 2022-10-24 | End: 2022-10-24

## 2022-10-24 RX ORDER — SENNA PLUS 8.6 MG/1
2 TABLET ORAL
Qty: 0 | Refills: 0 | DISCHARGE
Start: 2022-10-24

## 2022-10-24 RX ORDER — OXYCODONE HYDROCHLORIDE 5 MG/1
1 TABLET ORAL
Qty: 60 | Refills: 0
Start: 2022-10-24 | End: 2022-11-02

## 2022-10-24 RX ORDER — SERTRALINE 25 MG/1
2 TABLET, FILM COATED ORAL
Qty: 0 | Refills: 0 | DISCHARGE
Start: 2022-10-24

## 2022-10-24 RX ORDER — INSULIN LISPRO 100/ML
10 VIAL (ML) SUBCUTANEOUS
Refills: 0 | Status: DISCONTINUED | OUTPATIENT
Start: 2022-10-24 | End: 2022-10-24

## 2022-10-24 RX ORDER — DEXTROSE 50 % IN WATER 50 %
15 SYRINGE (ML) INTRAVENOUS ONCE
Refills: 0 | Status: DISCONTINUED | OUTPATIENT
Start: 2022-10-24 | End: 2022-10-24

## 2022-10-24 RX ORDER — INSULIN GLARGINE 100 [IU]/ML
20 INJECTION, SOLUTION SUBCUTANEOUS AT BEDTIME
Refills: 0 | Status: DISCONTINUED | OUTPATIENT
Start: 2022-10-24 | End: 2022-10-24

## 2022-10-24 RX ORDER — SODIUM CHLORIDE 9 MG/ML
1000 INJECTION, SOLUTION INTRAVENOUS
Refills: 0 | Status: DISCONTINUED | OUTPATIENT
Start: 2022-10-24 | End: 2022-10-24

## 2022-10-24 RX ORDER — SERTRALINE 25 MG/1
1 TABLET, FILM COATED ORAL
Qty: 0 | Refills: 0 | DISCHARGE

## 2022-10-24 RX ORDER — INSULIN LISPRO 100/ML
VIAL (ML) SUBCUTANEOUS
Refills: 0 | Status: DISCONTINUED | OUTPATIENT
Start: 2022-10-24 | End: 2022-10-24

## 2022-10-24 RX ORDER — DEXTROSE 50 % IN WATER 50 %
12.5 SYRINGE (ML) INTRAVENOUS ONCE
Refills: 0 | Status: DISCONTINUED | OUTPATIENT
Start: 2022-10-24 | End: 2022-10-24

## 2022-10-24 RX ORDER — DEXTROSE 50 % IN WATER 50 %
25 SYRINGE (ML) INTRAVENOUS ONCE
Refills: 0 | Status: DISCONTINUED | OUTPATIENT
Start: 2022-10-24 | End: 2022-10-24

## 2022-10-24 RX ORDER — GLUCAGON INJECTION, SOLUTION 0.5 MG/.1ML
1 INJECTION, SOLUTION SUBCUTANEOUS ONCE
Refills: 0 | Status: DISCONTINUED | OUTPATIENT
Start: 2022-10-24 | End: 2022-10-24

## 2022-10-24 RX ORDER — POLYETHYLENE GLYCOL 3350 17 G/17G
17 POWDER, FOR SOLUTION ORAL
Qty: 0 | Refills: 0 | DISCHARGE
Start: 2022-10-24

## 2022-10-24 RX ORDER — GABAPENTIN 400 MG/1
1 CAPSULE ORAL
Qty: 30 | Refills: 0
Start: 2022-10-24 | End: 2022-11-02

## 2022-10-24 RX ORDER — SERTRALINE 25 MG/1
1 TABLET, FILM COATED ORAL
Qty: 0 | Refills: 0 | DISCHARGE
Start: 2022-10-24

## 2022-10-24 RX ORDER — GABAPENTIN 400 MG/1
400 CAPSULE ORAL THREE TIMES A DAY
Refills: 0 | Status: DISCONTINUED | OUTPATIENT
Start: 2022-10-24 | End: 2022-10-24

## 2022-10-24 RX ADMIN — SERTRALINE 50 MILLIGRAM(S): 25 TABLET, FILM COATED ORAL at 10:14

## 2022-10-24 RX ADMIN — Medication 650 MILLIGRAM(S): at 08:25

## 2022-10-24 RX ADMIN — ENOXAPARIN SODIUM 40 MILLIGRAM(S): 100 INJECTION SUBCUTANEOUS at 10:09

## 2022-10-24 RX ADMIN — Medication 6 UNIT(S): at 07:56

## 2022-10-24 RX ADMIN — OXYCODONE HYDROCHLORIDE 10 MILLIGRAM(S): 5 TABLET ORAL at 09:57

## 2022-10-24 RX ADMIN — GABAPENTIN 300 MILLIGRAM(S): 400 CAPSULE ORAL at 06:01

## 2022-10-24 RX ADMIN — OXYCODONE HYDROCHLORIDE 15 MILLIGRAM(S): 5 TABLET ORAL at 06:31

## 2022-10-24 RX ADMIN — Medication 145 MILLIGRAM(S): at 10:09

## 2022-10-24 RX ADMIN — OXYCODONE HYDROCHLORIDE 10 MILLIGRAM(S): 5 TABLET ORAL at 10:27

## 2022-10-24 RX ADMIN — Medication 6: at 07:55

## 2022-10-24 RX ADMIN — SERTRALINE 200 MILLIGRAM(S): 25 TABLET, FILM COATED ORAL at 10:14

## 2022-10-24 RX ADMIN — OXYCODONE HYDROCHLORIDE 15 MILLIGRAM(S): 5 TABLET ORAL at 06:01

## 2022-10-24 RX ADMIN — Medication 650 MILLIGRAM(S): at 07:55

## 2022-10-24 RX ADMIN — Medication 5 MILLIGRAM(S): at 10:09

## 2022-10-24 NOTE — DISCHARGE NOTE NURSING/CASE MANAGEMENT/SOCIAL WORK - PATIENT PORTAL LINK FT
You can access the FollowMyHealth Patient Portal offered by [facility] by registering at the following website: [email or web address]. By joining [email or web address] FollowMyHealth portal, you will also be able to view your health information using other applications (apps) compatible with our system.

## 2022-10-24 NOTE — PROGRESS NOTE ADULT - SUBJECTIVE AND OBJECTIVE BOX
c/c: severe back/LE pain    HPI: 67F,  PMHx type 2 DM, HTN, HLD, anxiety/depression,  recently admitted for elective lumbar laminectomy/fusion complicated by hypercarbic resp failure post op requiring intubation/icu stay, ultimately stabilized, discharged 10/17, who presented to ED with wound drainage, parasthesias left foot and worsening pain.   She underwent a CT which showed post op changes and is admitted to spine. Hospitalist service consulted for diabetes management  Unable to complete MRI d/t claustrophobia.     pt seen and examined this am. Pain remarkably better with steroids. No sob/chest pain. LLE numbness improved and now involves only toes.   Tolerating po. NO difficulty voiding.     ROS: all 10 systems reviewed and is as above otherwise negative.     Vital Signs Last 24 Hrs  T(C): 36.4 (23 Oct 2022 08:17), Max: 37.1 (22 Oct 2022 20:16)  T(F): 97.5 (23 Oct 2022 08:17), Max: 98.8 (22 Oct 2022 20:16)  HR: 63 (23 Oct 2022 11:00) (56 - 63)  BP: 124/60 (23 Oct 2022 11:00) (124/60 - 152/64)  RR: 18 (23 Oct 2022 08:17) (18 - 18)  SpO2: 93% (23 Oct 2022 08:17) (93% - 98%)    Parameters below as of 23 Oct 2022 08:17  Patient On (Oxygen Delivery Method): room air        PHYSICAL EXAM:    GENERAL: comfortable, laying flat, nad  HEAD:  Normocephalic, atraumatic  EYES: EOMI, PERRLA  HEENT: Moist mucous membranes  NECK: Supple, No JVD  NERVOUS SYSTEM:  Alert & Oriented X3, non focal.   CHEST/LUNG: Clear to auscultation bilaterally  HEART: Regular rate and rhythm  ABDOMEN: Soft, Nontender, Nondistended, Bowel sounds present  GENITOURINARY: Voiding, no palpable bladder  EXTREMITIES:   No clubbing, cyanosis, or edema  MUSCULOSKELETAL- dressing over back d/c/i, no muscle tenderness  SKIN-no rash    LABS:                        9.8    9.71  )-----------( 349      ( 21 Oct 2022 20:10 )             31.9     10-    138  |  106  |  17  ----------------------------<  177<H>  5.0   |  28  |  0.91    Ca    9.5      21 Oct 2022 20:10    TPro  7.2  /  Alb  2.9<L>  /  TBili  0.3  /  DBili  x   /  AST  32  /  ALT  21  /  AlkPhos  38<L>  10-21      Urinalysis Basic - ( 21 Oct 2022 19:59 )    Color: Yellow / Appearance: Clear / S.015 / pH: x  Gluc: x / Ketone: Negative  / Bili: Negative / Urobili: Negative   Blood: x / Protein: 15 / Nitrite: Negative   Leuk Esterase: Small / RBC: 3-5 /HPF / WBC 6-10   Sq Epi: x / Non Sq Epi: Moderate / Bacteria: Few      CT Lumbar Spine No Cont (10.21.22 @ 20:35) >  IMPRESSION:    1. No vertebral fracture is recognized.    2. Posterior stabilization L4-S1 secures grade 1 anterior listhesis L4 on   L5 with routine postoperative appearance    3. Direct comparison to prior lumbar CT or MR imaging will benefit this   evaluation.  When these examinations become available, an addendum will   be provided as appropriate.    MEDICATIONS  (STANDING):  atorvastatin 40 milliGRAM(s) Oral at bedtime  dexAMETHasone  Injectable 2 milliGRAM(s) IV Push every 6 hours  enalapril 5 milliGRAM(s) Oral daily  enoxaparin Injectable 40 milliGRAM(s) SubCutaneous every 12 hours  fenofibrate Tablet 145 milliGRAM(s) Oral daily  gabapentin 300 milliGRAM(s) Oral three times a day  insulin glargine Injectable (LANTUS) 17 Unit(s) SubCutaneous at bedtime  insulin lispro (ADMELOG) corrective regimen sliding scale   SubCutaneous four times a day before meals  insulin lispro Injectable (ADMELOG) 6 Unit(s) SubCutaneous three times a day before meals  metoprolol succinate  milliGRAM(s) Oral daily  mirtazapine 15 milliGRAM(s) Oral at bedtime  polyethylene glycol 3350 17 Gram(s) Oral daily  senna 2 Tablet(s) Oral at bedtime  sertraline 200 milliGRAM(s) Oral daily  sertraline 50 milliGRAM(s) Oral daily    MEDICATIONS  (PRN):  acetaminophen     Tablet .. 650 milliGRAM(s) Oral every 6 hours PRN Temp greater or equal to 38C (100.4F), Mild Pain (1 - 3)  aluminum hydroxide/magnesium hydroxide/simethicone Suspension 30 milliLiter(s) Oral every 4 hours PRN Dyspepsia  ondansetron Injectable 4 milliGRAM(s) IV Push every 8 hours PRN Nausea and/or Vomiting  oxyCODONE    IR 5 milliGRAM(s) Oral every 4 hours PRN Mild Pain (1 - 3)  oxyCODONE    IR 10 milliGRAM(s) Oral every 4 hours PRN Moderate Pain (4 - 6)  oxyCODONE    IR 15 milliGRAM(s) Oral every 4 hours PRN Severe Pain (7 - 10)  zolpidem 5 milliGRAM(s) Oral at bedtime PRN Insomnia  zolpidem 5 milliGRAM(s) Oral at bedtime PRN Insomnia        ASSESSMENT AND PLAN:  67f, PMH as above a/w    1. Recent lumbar lami with possible post op fluid collection:  -admitted to spine.   -pain control, try iv toradol.   -prn dilaudid/oxycodone.   -iv steroids per spine.   -muscle relaxants  -incentive spirometry  -bowel regimen, start miralax/senna  -f/u repeat mri.     2. DMII  -will likely be uncontrolled with steroids.   -dc'd ohas  -started lantus/premeal insulin,, increase doses for better control   -continue ss    3. HTN:  -continue bb, ace-i    4. HLD:  -statin    5. Anxiety/depression.   -mirtazapine/sertraline.     6. DVT px:  -per primary team.        
c/c: severe back/LE pain    HPI: 67F,  PMHx type 2 DM, HTN, HLD, anxiety/depression,  recently admitted for elective lumbar laminectomy/fusion complicated by hypercarbic resp failure post op requiring intubation/icu stay, ultimately stabilized, discharged 10/17, who presented to ED with wound drainage, parasthesias left foot and worsening pain.   She underwent a CT which showed post op changes and is admitted to spine. Hospitalist service consulted for diabetes management  Unable to complete MRI d/t claustrophobia.     10/24/22- feels better and wants to go home    ROS: all 10 systems reviewed and is as above otherwise negative.     Vital Signs Last 24 Hrs  T(C): 36.5 (24 Oct 2022 08:09), Max: 36.9 (23 Oct 2022 20:29)  T(F): 97.7 (24 Oct 2022 08:09), Max: 98.4 (23 Oct 2022 20:29)  HR: 51 (24 Oct 2022 08:09) (51 - 61)  BP: 161/63 (24 Oct 2022 08:09) (126/55 - 161/63)  BP(mean): --  RR: 18 (24 Oct 2022 08:09) (18 - 18)  SpO2: 94% (24 Oct 2022 08:09) (94% - 99%)    Parameters below as of 24 Oct 2022 08:09  Patient On (Oxygen Delivery Method): room air    PHYSICAL EXAM:  GENERAL: comfortable  HEAD:  Normocephalic, atraumatic  EYES: EOMI, PERRLA  HEENT: Moist mucous membranes  NECK: Supple, No JVD  NERVOUS SYSTEM:  Alert & Oriented X3, non focal.   CHEST/LUNG: Clear to auscultation bilaterally  HEART: Regular rate and rhythm  ABDOMEN: Soft, Nontender, Nondistended, Bowel sounds present  GENITOURINARY: Voiding, no palpable bladder  EXTREMITIES:   No clubbing, cyanosis, or edema  MUSCULOSKELETAL- wound clean and dry  SKIN-no rash    LABS:    CAPILLARY BLOOD GLUCOSE  POCT Blood Glucose.: 186 mg/dL (24 Oct 2022 11:35)  POCT Blood Glucose.: 285 mg/dL (24 Oct 2022 07:48)  POCT Blood Glucose.: 310 mg/dL (23 Oct 2022 21:37)  POCT Blood Glucose.: 327 mg/dL (23 Oct 2022 17:42)      CT Lumbar Spine No Cont (10.21.22 @ 20:35) >  IMPRESSION:    1. No vertebral fracture is recognized.    2. Posterior stabilization L4-S1 secures grade 1 anterior listhesis L4 on   L5 with routine postoperative appearance    3. Direct comparison to prior lumbar CT or MR imaging will benefit this   evaluation.  When these examinations become available, an addendum will   be provided as appropriate.    MEDICATIONS  (STANDING):  atorvastatin 40 milliGRAM(s) Oral at bedtime  dexAMETHasone  Injectable 2 milliGRAM(s) IV Push every 6 hours  enalapril 5 milliGRAM(s) Oral daily  enoxaparin Injectable 40 milliGRAM(s) SubCutaneous every 12 hours  fenofibrate Tablet 145 milliGRAM(s) Oral daily  gabapentin 300 milliGRAM(s) Oral three times a day  insulin glargine Injectable (LANTUS) 17 Unit(s) SubCutaneous at bedtime  insulin lispro (ADMELOG) corrective regimen sliding scale   SubCutaneous four times a day before meals  insulin lispro Injectable (ADMELOG) 6 Unit(s) SubCutaneous three times a day before meals  metoprolol succinate  milliGRAM(s) Oral daily  mirtazapine 15 milliGRAM(s) Oral at bedtime  polyethylene glycol 3350 17 Gram(s) Oral daily  senna 2 Tablet(s) Oral at bedtime  sertraline 200 milliGRAM(s) Oral daily  sertraline 50 milliGRAM(s) Oral daily    MEDICATIONS  (PRN):  acetaminophen     Tablet .. 650 milliGRAM(s) Oral every 6 hours PRN Temp greater or equal to 38C (100.4F), Mild Pain (1 - 3)  aluminum hydroxide/magnesium hydroxide/simethicone Suspension 30 milliLiter(s) Oral every 4 hours PRN Dyspepsia  ondansetron Injectable 4 milliGRAM(s) IV Push every 8 hours PRN Nausea and/or Vomiting  oxyCODONE    IR 5 milliGRAM(s) Oral every 4 hours PRN Mild Pain (1 - 3)  oxyCODONE    IR 10 milliGRAM(s) Oral every 4 hours PRN Moderate Pain (4 - 6)  oxyCODONE    IR 15 milliGRAM(s) Oral every 4 hours PRN Severe Pain (7 - 10)  zolpidem 5 milliGRAM(s) Oral at bedtime PRN Insomnia  zolpidem 5 milliGRAM(s) Oral at bedtime PRN Insomnia        ASSESSMENT AND PLAN:  67f, PMH as above a/w    1. Recent lumbar lami   -spine following  -low suspicion of postop seroma  -muscle relaxants  -incentive spirometry  -bowel regimen, start miralax/senna    2. DMII  -resume home regimen    3. HTN:  -continue bb, ace-i    4. HLD:  -statin    5. Anxiety/depression.   -mirtazapine/sertraline.     6. DVT px:  -per primary team.    #Dispo- likely home today        
HPI:  68 y/o F with PMHx type 2 DM, HTN, DHRUV, anxiety/depression and HLD presents emergently having developed wound drainage and paresthesia L foot. Patient underwent lumbar lami/fusion/TLIF L4-S1 with excision L L4/5 & L5/S1 extraforaminal HNP on 10/11/22. She required postop intubation for acute respiratory distress and was extubated the following morning. Patient was discharged home on 10/17/22 without complaints of radicular pain. This week she has noticed wound drainage withouit evidence of fever/chills and then developed increased drainage earlier to day as well as paresthesia in her L foot. Patient denies weakness LEs or change in bowel/bladder function, HA, saddle paresthesia. (21 Oct 2022 23:34)    10/22/22 Patient with marked bilateral leg pain today-awaiting MRI    PAST MEDICAL & SURGICAL HISTORY:  Renal calculi      DM type 2 (diabetes mellitus, type 2)      HTN (hypertension)      Lower back pain      HLD (hyperlipidemia)      Anxiety and depression      At risk for obstructive sleep apnea  DHRUV precautions. Pt &gt;3 criteria on STOP-Bang questionnaire.      S/P       Status post lumbar laminectomy      Status post lumbar spinal fusion      MEDICATIONS  (STANDING):  atorvastatin 40 milliGRAM(s) Oral at bedtime  dexAMETHasone  IVPB 10 milliGRAM(s) IV Intermittent once  enalapril 5 milliGRAM(s) Oral daily  enoxaparin Injectable 40 milliGRAM(s) SubCutaneous every 12 hours  fenofibrate Tablet 145 milliGRAM(s) Oral daily  gabapentin 400 milliGRAM(s) Oral three times a day  glimepiride. 2 milliGRAM(s) Oral daily  metFORMIN 1000 milliGRAM(s) Oral daily  metoprolol succinate  milliGRAM(s) Oral daily  mirtazapine 15 milliGRAM(s) Oral at bedtime  sertraline 200 milliGRAM(s) Oral daily  sertraline 50 milliGRAM(s) Oral daily    MEDICATIONS  (PRN):  acetaminophen     Tablet .. 650 milliGRAM(s) Oral every 6 hours PRN Temp greater or equal to 38C (100.4F), Mild Pain (1 - 3)  aluminum hydroxide/magnesium hydroxide/simethicone Suspension 30 milliLiter(s) Oral every 4 hours PRN Dyspepsia  HYDROmorphone  Injectable 1 milliGRAM(s) IV Push every 4 hours PRN Severe Pain (7 - 10)  ondansetron Injectable 4 milliGRAM(s) IV Push every 8 hours PRN Nausea and/or Vomiting  oxyCODONE    IR 5 milliGRAM(s) Oral every 4 hours PRN Mild Pain (1 - 3)  oxyCODONE    IR 10 milliGRAM(s) Oral every 4 hours PRN Moderate Pain (4 - 6)  oxyCODONE    IR 15 milliGRAM(s) Oral every 4 hours PRN Severe Pain (7 - 10)  zolpidem 5 milliGRAM(s) Oral at bedtime PRN Insomnia  zolpidem 5 milliGRAM(s) Oral at bedtime PRN Insomnia      Allergies    copper (Hives)  morphine (Other)  strawberry (Other (Moderate))    Intolerances    PE:    Vital Signs Last 24 Hrs  T(C): 36.6 (22 Oct 2022 08:36), Max: 37.2 (21 Oct 2022 19:18)  T(F): 97.9 (22 Oct 2022 08:36), Max: 99 (21 Oct 2022 19:18)  HR: 67 (22 Oct 2022 08:36) (61 - 72)  BP: 144/90 (22 Oct 2022 08:36) (141/49 - 151/70)  BP(mean): --  RR: 18 (22 Oct 2022 08:36) (18 - 18)  SpO2: 95% (22 Oct 2022 08:36) (95% - 98%)    Parameters below as of 22 Oct 2022 08:36  Patient On (Oxygen Delivery Method): room air    Patient laying in bed c/o marked B/L leg pain  Voiding  Dressing without drainage this AM  (+) B/L SLR  No focal motor deficits    LABS                     9.8    9.71  )-----------( 349      ( 21 Oct 2022 20:10 )             31.9     10-21    138  |  106  |  17  ----------------------------<  177<H>  5.0   |  28  |  0.91    Ca    9.5      21 Oct 2022 20:10    TPro  7.2  /  Alb  2.9<L>  /  TBili  0.3  /  DBili  x   /  AST  32  /  ALT  21  /  AlkPhos  38<L>  10-21      STUDIES  CT LS spine    FINDINGS:    The lumbar spine is significant for posterior stabilization at L4, L5 5   and S1. Pedicle screws are present at each level on each side secured by   short vertical rods. There is transverse stabilization at the L5 inferior   endplate level. This hardware appears grossly intact and appropriate in   position, allowing for mild streak artifact partially obscuring the   adjacent structures. The interface between trabecular bone and screw   heads appears appropriate. The posterior paraspinal soft tissues have a   routine postoperative appearance. Induration and scarring is noted   posterior to the L2-S1 vertebra in the subcutaneous tissues. Fusion bone   is greatest to the right of midline appearing to fuse these levels. This   hardware secures grade 1 anterior listhesis L4 on L5. Spacing devices   were introduced into the L4-L5 and L5-S1 disc spaces. These also appear   appropriate in position. The streak artifacts somewhat limits evaluation   of the central canal and canal contents. Allowing for this, the broad for   an L5 laminectomy results in a patulous canal without residual central   canal stenosis. The neural foramina are partly obscured by artifact, with   residual narrowing appearing greatest to the left at L5-S1 at least   mild-to-moderate, lower radiotracer remaining stabilized lumbar neural   foramina.    Lumbar vertebral alignment also demonstrates a shallow idiopathic   levoscoliosis. This vertebral malalignment has an apex near the thoracic   lumbar junction.  Facet joints remain aligned.  Lumbar vertebral body   heights are maintained.   No vertebral fracture is seen.  No destructive   bone lesion is found.  The visualized sacral and pelvic bones appear   intact.    The remaining higher lumbar intervertebral disc spaces demonstrate   degenerative features including marginal osteophytes. There is at least   mild disc height loss at L1-L2. Ventral canal compromise at these higher   lumbar intervertebral disc levels does not appear to be high-grade.   Degenerative foraminal compromise may be greatest at L3-L4 to the left at   least mild-to-moderate, elsewhere low-grade. Throughout the higher lumbar   levels no high-grade central canal compromise is recognized by the CT  technique.  
HPI:  68 y/o F with PMHx type 2 DM, HTN, DHRUV, anxiety/depression and HLD presents emergently having developed wound drainage and paresthesia L foot. Patient underwent lumbar lami/fusion/TLIF L4-S1 with excision L L4/5 & L5/S1 extraforaminal HNP on 10/11/22. She required postop intubation for acute respiratory distress and was extubated the following morning. Patient was discharged home on 10/17/22 without complaints of radicular pain. This week she has noticed wound drainage withouit evidence of fever/chills and then developed increased drainage earlier to day as well as paresthesia in her L foot. Patient denies weakness LEs or change in bowel/bladder function, HA, saddle paresthesia. (21 Oct 2022 23:34)    10/22/22 Patient with marked bilateral leg pain today-awaiting MRI  10/23/22 Patient feels much better this AM, unable to tolerate complete MRI but (+) seroma causing thecal sac compression. IV steroid taper intiated    PAST MEDICAL & SURGICAL HISTORY:  Renal calculi      DM type 2 (diabetes mellitus, type 2)      HTN (hypertension)      Lower back pain      HLD (hyperlipidemia)      Anxiety and depression      At risk for obstructive sleep apnea  DHRUV precautions. Pt &gt;3 criteria on STOP-Bang questionnaire.      S/P       Status post lumbar laminectomy      Status post lumbar spinal fusion      MEDICATIONS  (STANDING):  atorvastatin 40 milliGRAM(s) Oral at bedtime  dexAMETHasone  IVPB 4 milliGRAM(s) IV Intermittent once  enalapril 5 milliGRAM(s) Oral daily  enoxaparin Injectable 40 milliGRAM(s) SubCutaneous every 12 hours  fenofibrate Tablet 145 milliGRAM(s) Oral daily  gabapentin 400 milliGRAM(s) Oral three times a day  glimepiride. 2 milliGRAM(s) Oral daily  metFORMIN 1000 milliGRAM(s) Oral daily  metoprolol succinate  milliGRAM(s) Oral daily  mirtazapine 15 milliGRAM(s) Oral at bedtime  sertraline 200 milliGRAM(s) Oral daily  sertraline 50 milliGRAM(s) Oral daily    MEDICATIONS  (PRN):  acetaminophen     Tablet .. 650 milliGRAM(s) Oral every 6 hours PRN Temp greater or equal to 38C (100.4F), Mild Pain (1 - 3)  aluminum hydroxide/magnesium hydroxide/simethicone Suspension 30 milliLiter(s) Oral every 4 hours PRN Dyspepsia  HYDROmorphone  Injectable 1 milliGRAM(s) IV Push every 4 hours PRN Severe Pain (7 - 10)  ondansetron Injectable 4 milliGRAM(s) IV Push every 8 hours PRN Nausea and/or Vomiting  oxyCODONE    IR 5 milliGRAM(s) Oral every 4 hours PRN Mild Pain (1 - 3)  oxyCODONE    IR 10 milliGRAM(s) Oral every 4 hours PRN Moderate Pain (4 - 6)  oxyCODONE    IR 15 milliGRAM(s) Oral every 4 hours PRN Severe Pain (7 - 10)  zolpidem 5 milliGRAM(s) Oral at bedtime PRN Insomnia  zolpidem 5 milliGRAM(s) Oral at bedtime PRN Insomnia      Allergies    copper (Hives)  morphine (Other)  strawberry (Other (Moderate))    Intolerances    PE:    Vital Signs Last 24 Hrs  T(C): 36.6 (22 Oct 2022 08:36), Max: 37.2 (21 Oct 2022 19:18)  T(F): 97.9 (22 Oct 2022 08:36), Max: 99 (21 Oct 2022 19:18)  HR: 67 (22 Oct 2022 08:36) (61 - 72)  BP: 144/90 (22 Oct 2022 08:36) (141/49 - 151/70)  BP(mean): --  RR: 18 (22 Oct 2022 08:36) (18 - 18)  SpO2: 95% (22 Oct 2022 08:36) (95% - 98%)    Parameters below as of 22 Oct 2022 08:36  Patient On (Oxygen Delivery Method): room air    Patient laying in bed without c/o leg pain  Voiding  Dressing without drainage this [id number] with small area of superficial dehiscence-no drainage  Negative B/L SLR  No focal motor deficits    LABS                     9.8    9.71  )-----------( 349      ( 21 Oct 2022 20:10 )             31.9     10-21    138  |  106  |  17  ----------------------------<  177<H>  5.0   |  28  |  0.91    Ca    9.5      21 Oct 2022 20:10    TPro  7.2  /  Alb  2.9<L>  /  TBili  0.3  /  DBili  x   /  AST  32  /  ALT  21  /  AlkPhos  38<L>  10-21      STUDIES  MRI LS spine   Interval posterior interbody lumbar fusion at L4-S1 with   laminectomies at L4 and L5.A 3.2 x 2.1 cm fluid collection is seen   within the L5 laminectomy bed with posterior mass effect on the thecal   sac and crowding of the cauda equina at this level. In addition minimal 2   cm fluid collection is seen in the posterior soft tissues at the L3-4   level. 9 x 3 cm fluid collection is also seen posteriorly within the   subcutaneous fat subjacent to the surgical incision. Moderate disc   degeneration at L1-2 and mild disc degeneration at L2-3 with loss of disc   height and signals nucleus pulposus. Bulges are noted at L1-2 through   L3-4 with small central disc herniation and annular tear at L1-2 and LEFT   foraminal disc herniations at L2-3 and L3-4 which narrow the LEFT neural   foramina.    CT LS spine  FINDINGS  CT scan LS spine  The lumbar spine is significant for posterior stabilization at L4, L5 5   and S1. Pedicle screws are present at each level on each side secured by   short vertical rods. There is transverse stabilization at the L5 inferior   endplate level. This hardware appears grossly intact and appropriate in   position, allowing for mild streak artifact partially obscuring the   adjacent structures. The interface between trabecular bone and screw   heads appears appropriate. The posterior paraspinal soft tissues have a   routine postoperative appearance. Induration and scarring is noted   posterior to the L2-S1 vertebra in the subcutaneous tissues. Fusion bone   is greatest to the right of midline appearing to fuse these levels. This   hardware secures grade 1 anterior listhesis L4 on L5. Spacing devices   were introduced into the L4-L5 and L5-S1 disc spaces. These also appear   appropriate in position. The streak artifacts somewhat limits evaluation   of the central canal and canal contents. Allowing for this, the broad for   an L5 laminectomy results in a patulous canal without residual central   canal stenosis. The neural foramina are partly obscured by artifact, with   residual narrowing appearing greatest to the left at L5-S1 at least   mild-to-moderate, lower radiotracer remaining stabilized lumbar neural   foramina.    Lumbar vertebral alignment also demonstrates a shallow idiopathic   levoscoliosis. This vertebral malalignment has an apex near the thoracic   lumbar junction.  Facet joints remain aligned.  Lumbar vertebral body   heights are maintained.   No vertebral fracture is seen.  No destructive   bone lesion is found.  The visualized sacral and pelvic bones appear   intact.    The remaining higher lumbar intervertebral disc spaces demonstrate   degenerative features including marginal osteophytes. There is at least   mild disc height loss at L1-L2. Ventral canal compromise at these higher   lumbar intervertebral disc levels does not appear to be high-grade.   Degenerative foraminal compromise may be greatest at L3-L4 to the left at   least mild-to-moderate, elsewhere low-grade. Throughout the higher lumbar   levels no high-grade central canal compromise is recognized by the CT  technique.

## 2022-10-24 NOTE — DISCHARGE NOTE PROVIDER - HOSPITAL COURSE
66 y/o F with PMHx type 2 DM, HTN, DHRUV, anxiety/depression and HLD presents emergently having developed wound drainage and paresthesia L foot. Patient underwent lumbar lami/fusion/TLIF L4-S1 with excision L L4/5 & L5/S1 extraforaminal HNP on 10/11/22. She required postop intubation for acute respiratory distress and was extubated the following morning. Patient was discharged home on 10/17/22 without complaints of radicular pain. This week she has noticed wound drainage withouit evidence of fever/chills and then developed increased drainage earlier to day as well as paresthesia in her L foot. Patient denies weakness LEs or change in bowel/bladder function, HA, saddle paresthesia. (21 Oct 2022 23:34)    10/22/22 Patient with marked bilateral leg pain today-awaiting MRI  10/23/22 Patient feels much better this AM, unable to tolerate complete MRI but (+) small seroma causing thecal sac compression. IV steroid taper initiated and Neurontin started but reduced  10/24/22 Patient refused another attempt to complete MRI, mild recurrent pain this AM. Incision clean and dry-no drainage, No evidence of infection. Mild (+) SLR, no motor deficits. Stable for D/C home today as ther is no indication to drain seroma.

## 2022-10-24 NOTE — DISCHARGE NOTE PROVIDER - CARE PROVIDER_API CALL
Francis Yang)  Orthopaedic Surgery  [address] [place] [address]  Phone: [phone]  Fax: [phone]  Follow Up Time:

## 2022-10-24 NOTE — PROGRESS NOTE ADULT - REASON FOR ADMISSION
Leg numbness and wound drainage

## 2022-10-24 NOTE — DISCHARGE NOTE PROVIDER - NSDCMRMEDTOKEN_GEN_ALL_CORE_FT
Ambien 10 mg oral tablet: 1 tab(s) orally once a day (at bedtime)  atorvastatin 40 mg oral tablet: 1 tab(s) orally once a day  enalapril 5 mg oral tablet: 1 tab(s) orally once a day  fenofibrate 145 mg oral tablet: 1 tab(s) orally once a day  glimepiride 2 mg oral tablet: 1 tab(s) orally once a day  Jardiance 25 mg oral tablet: 1 tab(s) orally once a day (in the morning)  metFORMIN 1000 mg oral tablet: 1 tab(s) orally once a day  metoprolol succinate 100 mg oral tablet, extended release: 1 tab(s) orally once a day  mirtazapine 15 mg oral tablet: 1 tab(s) orally once a day (at bedtime)  Neurontin 400 mg oral capsule: 1 cap(s) orally 3 times a day MDD:3  oxyCODONE 10 mg oral tablet: 1 tab(s) orally every 4 hours, As needed, Moderate Pain (4 - 6) MDD:6  polyethylene glycol 3350 oral powder for reconstitution: 17 gram(s) orally once a day  senna leaf extract oral tablet: 2 tab(s) orally once a day (at bedtime)  sertraline 100 mg oral tablet: 2 tab(s) orally once a day  sertraline 50 mg oral tablet: 1 tab(s) orally once a day  silver sulfADIAZINE 1% topical cream: Apply topically to affected area 2 times a day MDD:2 applications  Trulicity Pen 1.5 mg/0.5 mL subcutaneous solution: subcutaneous once a week on Momdays last taken today.

## 2022-10-24 NOTE — DISCHARGE NOTE PROVIDER - NSDCCPCAREPLAN_GEN_ALL_CORE_FT
PRINCIPAL DISCHARGE DIAGNOSIS  Diagnosis: Drainage from wound  Assessment and Plan of Treatment:       SECONDARY DISCHARGE DIAGNOSES  Diagnosis: Severe back pain  Assessment and Plan of Treatment:

## 2022-10-24 NOTE — DISCHARGE NOTE NURSING/CASE MANAGEMENT/SOCIAL WORK - NSDCPEFALRISK_GEN_ALL_CORE
For information on Fall & Injury Prevention, visit: [email or web address] OR  [email or web address] OR  https://www.cdc.gov/steadi/patient.html
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
## 2022-10-30 DIAGNOSIS — L24.A9 IRRITANT CONTACT DERMATITIS DUE FRICTION OR CONTACT WITH OTHER SPECIFIED BODY FLUIDS: ICD-10-CM

## 2022-10-30 DIAGNOSIS — F40.240 CLAUSTROPHOBIA: ICD-10-CM

## 2022-10-30 DIAGNOSIS — F41.9 ANXIETY DISORDER, UNSPECIFIED: ICD-10-CM

## 2022-10-30 DIAGNOSIS — E66.9 OBESITY, UNSPECIFIED: ICD-10-CM
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
## 2022-10-30 DIAGNOSIS — Z91.048 OTHER NONMEDICINAL SUBSTANCE ALLERGY STATUS: ICD-10-CM

## 2022-10-30 DIAGNOSIS — G97.63 POSTPROCEDURAL SEROMA OF A NERVOUS SYSTEM ORGAN OR STRUCTURE FOLLOWING A NERVOUS SYSTEM PROCEDURE: ICD-10-CM
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
## 2022-10-30 DIAGNOSIS — Z91.018 ALLERGY TO OTHER FOODS: ICD-10-CM

## 2022-10-30 DIAGNOSIS — Z79.84 LONG TERM (CURRENT) USE OF ORAL HYPOGLYCEMIC DRUGS: ICD-10-CM

## 2022-10-30 DIAGNOSIS — E78.5 HYPERLIPIDEMIA, UNSPECIFIED: ICD-10-CM

## 2022-10-30 DIAGNOSIS — F32.A DEPRESSION, UNSPECIFIED: ICD-10-CM

## 2022-10-30 DIAGNOSIS — R20.2 PARESTHESIA OF SKIN: ICD-10-CM

## 2022-10-30 DIAGNOSIS — I10 ESSENTIAL (PRIMARY) HYPERTENSION: ICD-10-CM
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
## 2022-10-30 DIAGNOSIS — Z79.899 OTHER LONG TERM (CURRENT) DRUG THERAPY: ICD-10-CM

## 2022-10-30 DIAGNOSIS — Z79.4 LONG TERM (CURRENT) USE OF INSULIN: ICD-10-CM

## 2022-10-30 DIAGNOSIS — G47.33 OBSTRUCTIVE SLEEP APNEA (ADULT) (PEDIATRIC): ICD-10-CM

## 2022-10-30 DIAGNOSIS — Z88.5 ALLERGY STATUS TO NARCOTIC AGENT: ICD-10-CM

## 2022-10-30 DIAGNOSIS — E11.9 TYPE 2 DIABETES MELLITUS WITHOUT COMPLICATIONS: ICD-10-CM

## 2022-10-30 DIAGNOSIS — Z98.1 ARTHRODESIS STATUS: ICD-10-CM

## 2023-01-26 ENCOUNTER — APPOINTMENT (OUTPATIENT)
Dept: ORTHOPEDIC SURGERY | Facility: CLINIC | Age: 68
End: 2023-01-26
Payer: MEDICARE

## 2023-01-26 ENCOUNTER — NON-APPOINTMENT (OUTPATIENT)
Age: 68
End: 2023-01-26

## 2023-01-26 DIAGNOSIS — M25.562 PAIN IN LEFT KNEE: ICD-10-CM

## 2023-01-26 PROCEDURE — 99203 OFFICE O/P NEW LOW 30 MIN: CPT | Mod: 25

## 2023-01-26 PROCEDURE — 20610 DRAIN/INJ JOINT/BURSA W/O US: CPT | Mod: LT

## 2023-01-26 PROCEDURE — 73564 X-RAY EXAM KNEE 4 OR MORE: CPT | Mod: LT

## 2023-01-26 NOTE — HISTORY OF PRESENT ILLNESS
[de-identified] : Patient presents with left knee pain.  States been going on for years.  Describes the pain is over the anterior aspect of her knee.  Does complain of buckling.  States she previously had steroid and gel injections prior to COVID and those used to work well.  States she cannot take anti-inflammatories due to her stomach.  Currently take Tylenol which does not help.  Does report she had an MRI from an outside facility which showed a cyst.  Recently had a posterior spinal fusion.  Does have diabetes, on oral medications. negative

## 2023-01-26 NOTE — PROCEDURE
[de-identified] : Left [Knee] Injection - Steroid\par The risks, benefits, and alternatives of the injection were reviewed/discussed with the patient today in office and all of their questions were answered. The patient consented to the procedure. The [inferolateral] injection site was prepped with chloroprep.  Cold spray was utilized to numb the skin. Utilizing sterile technique, the [knee] was injected with 1 ml 40 mg [methylprednisolone], 4 ml 1% Lidocaine, 5 mL 0.25% Bupivicaine. A sterile bandage was applied. The patient tolerated the procedure well and there were no complications.

## 2023-01-26 NOTE — PHYSICAL EXAM
[de-identified] : General Appearance: normal without acute distress\par Mental: Alert and oriented x 3\par Psych/affect: appropriate, cooperative\par Gait: Mildly antalgic gait\par \par Left hip: Normal ROM without pain on IR/ER\par \par Left knee\par Inspection: Mild effusion, no erythema.\par Wounds: None.\par Alignment: Neutral\par Palpation: Nontender to palpation\par ROM active (in degrees): 0-120 with crepitus through the arc of motion\par Ligamentous laxity: all ligaments appear stable, negative ant. drawer test, negative post. drawer test, stable to varus stress test, stable to valgus stress test. Negative Lachman's test\par Meniscal Test: Negative McMurrays, negative Ginny.\par Muscle Test: good quad strength. [de-identified] : Left knee xrays, standing AP/Lateral and Merchant films, and 45 degree PA standing view taken today in the office are reviewed and demonstrate patellofemoral arthritis

## 2023-01-26 NOTE — DISCUSSION/SUMMARY
[de-identified] : Left knee patellofemoral osteoarthritis\par \par Extensive discussion of the natural history of this issue was had with the patient.  We discussed the treatment options focusing on conservative therapy which includes anti-inflammatories, physical therapy/home exercise, & activity modification.  Patient elected for steroid injection today.  Patient declined anti-inflammatory medications due to her stomach issues and formal physical therapy as she is still recovering from her back surgery.  Patient to follow-up as needed.  She will bring her MRI report next time.

## 2023-02-28 ENCOUNTER — APPOINTMENT (OUTPATIENT)
Dept: ORTHOPEDIC SURGERY | Facility: CLINIC | Age: 68
End: 2023-02-28
Payer: MEDICARE

## 2023-02-28 PROCEDURE — 99447 NTRPROF PH1/NTRNET/EHR 11-20: CPT

## 2023-02-28 RX ORDER — HYALURONATE SODIUM 20 MG/2 ML
20 SYRINGE (ML) INTRAARTICULAR
Qty: 1 | Refills: 0 | Status: DISCONTINUED | COMMUNITY
Start: 2023-02-28 | End: 2023-02-28

## 2023-02-28 RX ORDER — HYALURONATE SODIUM 20 MG/2 ML
20 SYRINGE (ML) INTRAARTICULAR
Qty: 1 | Refills: 0 | Status: ACTIVE | OUTPATIENT
Start: 2023-02-28

## 2023-03-03 ENCOUNTER — APPOINTMENT (OUTPATIENT)
Dept: ORTHOPEDIC SURGERY | Facility: CLINIC | Age: 68
End: 2023-03-03
Payer: MEDICARE

## 2023-03-03 PROCEDURE — 99213 OFFICE O/P EST LOW 20 MIN: CPT | Mod: 25

## 2023-03-03 PROCEDURE — 20610 DRAIN/INJ JOINT/BURSA W/O US: CPT | Mod: LT

## 2023-03-03 NOTE — PHYSICAL EXAM
[de-identified] : Left hip: Normal ROM without pain on IR/ER\par \par Left knee\par Inspection: Mild effusion, no erythema.\par Wounds: None.\par Alignment: Neutral\par Palpation: Tender palpation lateral joint line\par ROM active (in degrees): 0-120 with crepitus through the arc of motion\par Ligamentous laxity: all ligaments appear stable, negative ant. drawer test, negative post. drawer test, stable to varus stress test, stable to valgus stress test. Negative Lachman's test\par Meniscal Test: Negative McMurrays, negative Ginny.\par Muscle Test: good quad strength. [de-identified] : 1/26/2023: Left knee xrays, standing AP/Lateral and Merchant films, and 45 degree PA standing view taken today in the office are reviewed and demonstrate patellofemoral arthritis

## 2023-03-03 NOTE — PROCEDURE
[de-identified] : Left knee Injection - hyaluronic acid\par The risks, benefits, and alternatives of the injection were reviewed/discussed with the patient today in office and all of their questions were answered. The patient consented to the procedure. The inferolateral injection site was prepped with chloroprep.  Cold spray was utilized to numb the skin. Utilizing sterile technique, the knee was injected with [Euflexxa]. A sterile bandage was applied. The patient tolerated the procedure well and there were no complications.\par Lot: [id number]\par Exp: 2020//21

## 2023-03-03 NOTE — HISTORY OF PRESENT ILLNESS
[de-identified] : 3/3/2023: Patient here for follow-up for left knee pain.  States knee is still bothering her.  Steroid injection only worked for that day.  Occasionally does have buckling.\par \par 1/26/2023: Patient presents with left knee pain.  States been going on for years.  Describes the pain is over the anterior aspect of her knee.  Does complain of buckling.  States she previously had steroid and gel injections prior to COVID and those used to work well.  States she cannot take anti-inflammatories due to her stomach.  Currently take Tylenol which does not help.  Does report she had an MRI from an outside facility which showed a cyst.  Recently had a posterior spinal fusion.  Does have diabetes, on oral medications.

## 2023-03-03 NOTE — DISCUSSION/SUMMARY
[de-identified] : Left knee severe patellofemoral osteoarthritis\par \par Extensive discussion of the natural history of this issue was had with the patient.  We discussed the treatment options focusing on conservative therapy which includes anti-inflammatories, physical therapy/home exercise, & activity modification.  We will continue trial of conservative treatment.  Patient elected for Euflexxa injection today.

## 2023-03-09 ENCOUNTER — APPOINTMENT (OUTPATIENT)
Dept: ORTHOPEDIC SURGERY | Facility: CLINIC | Age: 68
End: 2023-03-09
Payer: MEDICARE

## 2023-03-09 PROCEDURE — 20610 DRAIN/INJ JOINT/BURSA W/O US: CPT | Mod: LT

## 2023-03-10 NOTE — ED ADULT TRIAGE NOTE - WEIGHT IN LBS
[Follow-Up Visit] : a follow-up pain management visit [Home] : at home, [unfilled] , at the time of the visit. [Medical Office: ([facility])___] : at the medical office located in  [address] [Patient] : the patient [Self] : self [FreeTextEntry2] : Neck and back pain

## 2023-03-16 ENCOUNTER — APPOINTMENT (OUTPATIENT)
Dept: ORTHOPEDIC SURGERY | Facility: CLINIC | Age: 68
End: 2023-03-16
Payer: MEDICARE

## 2023-03-16 PROCEDURE — 20610 DRAIN/INJ JOINT/BURSA W/O US: CPT | Mod: LT

## 2023-03-16 NOTE — PROCEDURE
[de-identified] : Left knee Injection - hyaluronic acid\par The risks, benefits, and alternatives of the injection were reviewed/discussed with the patient today in office and all of their questions were answered. The patient consented to the procedure. The inferolateral injection site was prepped with chloroprep. Cold spray was utilized to numb the skin. Utilizing sterile technique, the knee was injected with [Euflexxa]. A sterile bandage was applied. The patient tolerated the procedure well and there were no complications.\par Lot: [id number]\par Exp: 2020/04/21

## 2023-03-16 NOTE — PROCEDURE
[de-identified] : Left knee Injection - hyaluronic acid\par The risks, benefits, and alternatives of the injection were reviewed/discussed with the patient today in office and all of their questions were answered. The patient consented to the procedure. The inferolateral injection site was prepped with chloroprep. Cold spray was utilized to numb the skin. Utilizing sterile technique, the knee was injected with [Euflexxa]. A sterile bandage was applied. The patient tolerated the procedure well and there were no complications.\par Lot: [id number]\par Exp: 2020/04/21

## 2023-07-03 ENCOUNTER — APPOINTMENT (OUTPATIENT)
Dept: ORTHOPEDIC SURGERY | Facility: CLINIC | Age: 68
End: 2023-07-03
Payer: MEDICARE

## 2023-07-03 ENCOUNTER — APPOINTMENT (OUTPATIENT)
Dept: ORTHOPEDIC SURGERY | Facility: CLINIC | Age: 68
End: 2023-07-03

## 2023-07-03 VITALS — BODY MASS INDEX: 40.04 KG/M2 | WEIGHT: 226 LBS | HEIGHT: 63 IN

## 2023-07-03 PROCEDURE — 99203 OFFICE O/P NEW LOW 30 MIN: CPT

## 2023-07-03 PROCEDURE — 73564 X-RAY EXAM KNEE 4 OR MORE: CPT | Mod: LT

## 2023-07-05 NOTE — HISTORY OF PRESENT ILLNESS
[7] : 7 [0] : 0 [Dull/Aching] : dull/aching [Sharp] : sharp [Intermittent] : intermittent [Nothing helps with pain getting better] : Nothing helps with pain getting better [Walking] : walking [] : no [FreeTextEntry1] : Lt. Knee [FreeTextEntry5] : 69 y/o F presents for NP eval. of Lt. Knee. Pt reports of chronic knee pain for the past few years with no associated trauma. Occasional buckling and swelling persists. No use of NSAIDs. CSI in the past with no relief. Pt was last seen by Dr. Reina 3/3/23, given Euflexxa INJ with no relief as well.  [de-identified] : 3/3/23 [de-identified] : Dr. Reina [de-identified] : MRI [de-identified] : E

## 2023-07-05 NOTE — ASSESSMENT
[FreeTextEntry1] : The patient is a 67 yo woman presenting with worsening left knee pain. She reports seeing Dr. Reina in March and received a CSI and gel injection without relief. She has consistent pain with ambulation and using stairs. She has pain with pivoting and twisting. She has pain at night. She denies new trauma. She had an MRI at that time that was positive for a left lateral meniscus tear. She wishes to consider surgery. \par \par Left knee exam: Well appearing female in no apparent distress. No rashes, scars, or abrasions. Neurovascularly intact. Tender to palpation at lateral and medial joint line. Ranging from 0-110 with pain at end range. No varus/valgus deformity. Negative anterior/posterior drawer, + Mcmurrays. - Lachmans. Screening exam of the left hip shows a full, painless ROM.\par \par Left knee xrays taken today in office, 4 views WB: Minimal OA noted. No fractures or loose bodies. No obvious tumors, masses, or lesions. \par \par MRI of left knee 3/23 - disc kept: Left lateral meniscus tear with possible root tear. Minimal OA. \par \par The patient wishes to consider arthroscopic surgery for the left lateral meniscus. She will speak to Dr. Stevenson to schedule this. \par \par \par

## 2023-07-06 ENCOUNTER — NON-APPOINTMENT (OUTPATIENT)
Age: 68
End: 2023-07-06

## 2023-07-18 ENCOUNTER — OUTPATIENT (OUTPATIENT)
Dept: OUTPATIENT SERVICES | Facility: HOSPITAL | Age: 68
LOS: 1 days | End: 2023-07-18
Payer: MEDICARE

## 2023-07-18 VITALS
WEIGHT: 218.26 LBS | RESPIRATION RATE: 16 BRPM | HEIGHT: 63 IN | DIASTOLIC BLOOD PRESSURE: 60 MMHG | HEART RATE: 65 BPM | OXYGEN SATURATION: 98 % | TEMPERATURE: 98 F | SYSTOLIC BLOOD PRESSURE: 149 MMHG
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
## 2023-07-18 DIAGNOSIS — Z98.1 ARTHRODESIS STATUS: Chronic | ICD-10-CM
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
## 2023-07-18 DIAGNOSIS — Z98.890 OTHER SPECIFIED POSTPROCEDURAL STATES: Chronic | ICD-10-CM

## 2023-07-18 DIAGNOSIS — S83.272A COMPLEX TEAR OF LATERAL MENISCUS, CURRENT INJURY, LEFT KNEE, INITIAL ENCOUNTER: ICD-10-CM

## 2023-07-18 DIAGNOSIS — Z01.818 ENCOUNTER FOR OTHER PREPROCEDURAL EXAMINATION: ICD-10-CM

## 2023-07-18 DIAGNOSIS — Z98.891 HISTORY OF UTERINE SCAR FROM PREVIOUS SURGERY: Chronic | ICD-10-CM

## 2023-07-18 LAB
A1C WITH ESTIMATED AVERAGE GLUCOSE RESULT: 7.9 % — HIGH (ref 4–5.6)
ANION GAP SERPL CALC-SCNC: 6 MMOL/L — SIGNIFICANT CHANGE UP (ref 5–17)
BASOPHILS # BLD AUTO: 0.04 K/UL — SIGNIFICANT CHANGE UP (ref 0–0.2)
BASOPHILS NFR BLD AUTO: 0.3 % — SIGNIFICANT CHANGE UP (ref 0–2)
BUN SERPL-MCNC: 26 MG/DL — HIGH (ref 7–23)
CALCIUM SERPL-MCNC: 10.2 MG/DL — HIGH (ref 8.5–10.1)
CHLORIDE SERPL-SCNC: 108 MMOL/L — SIGNIFICANT CHANGE UP (ref 96–108)
CO2 SERPL-SCNC: 24 MMOL/L — SIGNIFICANT CHANGE UP (ref 22–31)
CREAT SERPL-MCNC: 1.1 MG/DL — SIGNIFICANT CHANGE UP (ref 0.5–1.3)
EGFR: 54 ML/MIN/1.73M2 — LOW
EOSINOPHIL # BLD AUTO: 0.22 K/UL — SIGNIFICANT CHANGE UP (ref 0–0.5)
EOSINOPHIL NFR BLD AUTO: 1.9 % — SIGNIFICANT CHANGE UP (ref 0–6)
ESTIMATED AVERAGE GLUCOSE: 180 MG/DL — HIGH (ref 68–114)
GLUCOSE SERPL-MCNC: 139 MG/DL — HIGH (ref 70–99)
HCT VFR BLD CALC: 44.7 % — SIGNIFICANT CHANGE UP (ref 34.5–45)
HGB BLD-MCNC: 14.7 G/DL — SIGNIFICANT CHANGE UP (ref 11.5–15.5)
IMM GRANULOCYTES NFR BLD AUTO: 0.3 % — SIGNIFICANT CHANGE UP (ref 0–0.9)
LYMPHOCYTES # BLD AUTO: 2.82 K/UL — SIGNIFICANT CHANGE UP (ref 1–3.3)
LYMPHOCYTES # BLD AUTO: 24.2 % — SIGNIFICANT CHANGE UP (ref 13–44)
MCHC RBC-ENTMCNC: 27.8 PG — SIGNIFICANT CHANGE UP (ref 27–34)
MCHC RBC-ENTMCNC: 32.9 GM/DL — SIGNIFICANT CHANGE UP (ref 32–36)
MCV RBC AUTO: 84.7 FL — SIGNIFICANT CHANGE UP (ref 80–100)
MONOCYTES # BLD AUTO: 0.74 K/UL — SIGNIFICANT CHANGE UP (ref 0–0.9)
MONOCYTES NFR BLD AUTO: 6.4 % — SIGNIFICANT CHANGE UP (ref 2–14)
NEUTROPHILS # BLD AUTO: 7.78 K/UL — HIGH (ref 1.8–7.4)
NEUTROPHILS NFR BLD AUTO: 66.9 % — SIGNIFICANT CHANGE UP (ref 43–77)
PLATELET # BLD AUTO: 290 K/UL — SIGNIFICANT CHANGE UP (ref 150–400)
POTASSIUM SERPL-MCNC: 4.2 MMOL/L — SIGNIFICANT CHANGE UP (ref 3.5–5.3)
POTASSIUM SERPL-SCNC: 4.2 MMOL/L — SIGNIFICANT CHANGE UP (ref 3.5–5.3)
RBC # BLD: 5.28 M/UL — HIGH (ref 3.8–5.2)
RBC # FLD: 14.6 % — HIGH (ref 10.3–14.5)
SODIUM SERPL-SCNC: 138 MMOL/L — SIGNIFICANT CHANGE UP (ref 135–145)
WBC # BLD: 11.64 K/UL — HIGH (ref 3.8–10.5)
WBC # FLD AUTO: 11.64 K/UL — HIGH (ref 3.8–10.5)

## 2023-07-18 PROCEDURE — 36415 COLL VENOUS BLD VENIPUNCTURE: CPT

## 2023-07-18 PROCEDURE — 80048 BASIC METABOLIC PNL TOTAL CA: CPT

## 2023-07-18 PROCEDURE — 83036 HEMOGLOBIN GLYCOSYLATED A1C: CPT

## 2023-07-18 PROCEDURE — 99214 OFFICE O/P EST MOD 30 MIN: CPT | Mod: 25

## 2023-07-18 PROCEDURE — 93010 ELECTROCARDIOGRAM REPORT: CPT

## 2023-07-18 PROCEDURE — 85025 COMPLETE CBC W/AUTO DIFF WBC: CPT

## 2023-07-18 PROCEDURE — 93005 ELECTROCARDIOGRAM TRACING: CPT

## 2023-07-18 RX ORDER — EMPAGLIFLOZIN 10 MG/1
1 TABLET, FILM COATED ORAL
Qty: 0 | Refills: 0 | DISCHARGE

## 2023-07-18 RX ORDER — DULAGLUTIDE 4.5 MG/.5ML
0 INJECTION, SOLUTION SUBCUTANEOUS
Qty: 0 | Refills: 0 | DISCHARGE

## 2023-07-18 NOTE — H&P PST ADULT - BP NONINVASIVE SYSTOLIC (MM HG)
Oriented x self only today. Tele: A fib with BBB. Sitter at bedside for patient safety and prevent line removal. New IV placed to R Ac. Attempted CT abdomen, however pt could not tolerate. Senna and Miralax given for pt bowels. Advanced to full liquid diet this evening. Feels need to stool, but not going. Abdomen soft this afternoon. Hypoactive bowel sounds. Clear liquid diet started today. Insulin coverage per MAR. Up to chair with lift. Pt behavior scale: yellow. Plan: GI following.       1800: CT abdomen still order on hold. Make NPO in AM for possible completion of scan. 500 cc water prep prior to scan if mentation improves to complete scan. Also, new order for Plavix to start.    149

## 2023-07-18 NOTE — H&P PST ADULT - MUSCULOSKELETAL COMMENTS
left knee pain on palpation lateral aspect of knee See HPI; hx lumbar fusion 10/2022, followed by Dr Yang

## 2023-07-18 NOTE — H&P PST ADULT - NSICDXPASTMEDICALHX_GEN_ALL_CORE_FT
PAST MEDICAL HISTORY:  Anxiety and depression     Arthritis     At risk for obstructive sleep apnea DHRUV precautions. Pt >3 criteria on STOP-Bang questionnaire.    Diverticulosis     DM type 2 (diabetes mellitus, type 2)     Gastroparesis     History of colitis     History of hepatitis C     History of myocardial infarction     HLD (hyperlipidemia)     HTN (hypertension)     Kidney disease     Lower back pain     Morbid obesity     Renal calculi     Torn meniscus

## 2023-07-18 NOTE — H&P PST ADULT - CARDIOVASCULAR COMMENTS
hx MI age 40, angioplasty, medication, HTN, Hyperlipidemia--last cardiac evaluation done with Dr Adkins 2022 prior to lumbar fusion

## 2023-07-18 NOTE — H&P PST ADULT - NSICDXPASTSURGICALHX_GEN_ALL_CORE_FT
PAST SURGICAL HISTORY:  History of 2  sections     History of colonoscopy     History of lumbar fusion     History of lumbar laminectomy     History of percutaneous angioplasty

## 2023-07-18 NOTE — H&P PST ADULT - HISTORY OF PRESENT ILLNESS
68 y.o WD, WN morbidly obese female presents to PST with hx of left knee pain. Patient reports left knee pain for "years" . She has treated the pain conservatively , injections and gel shots without relief. Her pain has worsened, worse on ambulation and stairs and impacting her sleep. Patient states she had an MRI which revealed a torn lateral meniscus. Her hx significant for HTN, Hyperlipidemia, MI age 40, DM II, back pain and anxiety/depression. She is opting for surgical intervention. Scheduled for a Left Knee Arthroscopy

## 2023-07-18 NOTE — H&P PST ADULT - BLOOD TRANSFUSION, PREVIOUS, PROFILE
Jose A Cadena is a 36year old male. HPI:   Patient presents with:   Follow - Up: still having IBS symptoms; bentyl helps but states he feels symptoms have changed over past couple years    The patient is a 70-year-old male who has a history of irrita as needed for Cramping. Disp: 30 tablet Rfl: 0   Dicyclomine HCl 10 MG Oral Cap Take 1 capsule (10 mg total) by mouth 3 (three) times daily as needed. Disp: 90 capsule Rfl: 0   Ibuprofen (ADVIL) 200 MG Oral Cap Take 200 mg by mouth as needed.  Disp:  Rfl: Cramping. Imaging & Referrals:  None     6/4/2019  Tatiana Rivas.  Ida Iyer MD no

## 2023-07-18 NOTE — H&P PST ADULT - ASSESSMENT
68 y.o female scheduled for a Left Knee Arthroscopy    Plan  1. Stop all NSAIDS, herbal supplements and vitamins for 7 days. Hold Jardiance 3 days prior to surgery   2. NPO at midnight.  3. Take the following medications Alprazolam  with small sips of water on the morning of your procedure/surgery.  4. Use EZ sponges as directed  5. Labs, EKG as per surgeon  6. PMD RICHARD Massey visit for optimization prior to surgery as per surgeon  7. Preprocedure education provided

## 2023-07-19 DIAGNOSIS — Z01.818 ENCOUNTER FOR OTHER PREPROCEDURAL EXAMINATION: ICD-10-CM

## 2023-07-19 DIAGNOSIS — S83.272A COMPLEX TEAR OF LATERAL MENISCUS, CURRENT INJURY, LEFT KNEE, INITIAL ENCOUNTER: ICD-10-CM

## 2023-07-26 ENCOUNTER — TRANSCRIPTION ENCOUNTER (OUTPATIENT)
Age: 68
End: 2023-07-26

## 2023-07-27 RX ORDER — SODIUM CHLORIDE 9 MG/ML
1000 INJECTION, SOLUTION INTRAVENOUS
Refills: 0 | Status: DISCONTINUED | OUTPATIENT
Start: 2023-07-28 | End: 2023-07-28

## 2023-07-27 RX ORDER — FENTANYL CITRATE 50 UG/ML
50 INJECTION INTRAVENOUS
Refills: 0 | Status: DISCONTINUED | OUTPATIENT
Start: 2023-07-28 | End: 2023-07-28

## 2023-07-27 RX ORDER — ONDANSETRON 8 MG/1
4 TABLET, FILM COATED ORAL ONCE
Refills: 0 | Status: DISCONTINUED | OUTPATIENT
Start: 2023-07-28 | End: 2023-07-28

## 2023-07-28 ENCOUNTER — TRANSCRIPTION ENCOUNTER (OUTPATIENT)
Age: 68
End: 2023-07-28

## 2023-07-28 ENCOUNTER — APPOINTMENT (OUTPATIENT)
Dept: ORTHOPEDIC SURGERY | Facility: HOSPITAL | Age: 68
End: 2023-07-28

## 2023-07-28 ENCOUNTER — OUTPATIENT (OUTPATIENT)
Dept: INPATIENT UNIT | Facility: HOSPITAL | Age: 68
LOS: 1 days | Discharge: HOME | End: 2023-07-28
Payer: MEDICARE

## 2023-07-28 ENCOUNTER — RESULT REVIEW (OUTPATIENT)
Age: 68
End: 2023-07-28

## 2023-07-28 VITALS
TEMPERATURE: 98 F | RESPIRATION RATE: 16 BRPM | HEART RATE: 65 BPM | HEIGHT: 63 IN | OXYGEN SATURATION: 99 % | DIASTOLIC BLOOD PRESSURE: 75 MMHG | WEIGHT: 218.26 LBS | SYSTOLIC BLOOD PRESSURE: 142 MMHG

## 2023-07-28 VITALS
SYSTOLIC BLOOD PRESSURE: 140 MMHG | HEART RATE: 65 BPM | RESPIRATION RATE: 18 BRPM | OXYGEN SATURATION: 96 % | DIASTOLIC BLOOD PRESSURE: 67 MMHG

## 2023-07-28 DIAGNOSIS — Z98.890 OTHER SPECIFIED POSTPROCEDURAL STATES: Chronic | ICD-10-CM

## 2023-07-28 DIAGNOSIS — Z98.891 HISTORY OF UTERINE SCAR FROM PREVIOUS SURGERY: Chronic | ICD-10-CM

## 2023-07-28 DIAGNOSIS — Z98.1 ARTHRODESIS STATUS: Chronic | ICD-10-CM

## 2023-07-28 DIAGNOSIS — S83.272A COMPLEX TEAR OF LATERAL MENISCUS, CURRENT INJURY, LEFT KNEE, INITIAL ENCOUNTER: ICD-10-CM

## 2023-07-28 PROCEDURE — 88304 TISSUE EXAM BY PATHOLOGIST: CPT | Mod: 26

## 2023-07-28 PROCEDURE — 88304 TISSUE EXAM BY PATHOLOGIST: CPT

## 2023-07-28 PROCEDURE — 29881 ARTHRS KNE SRG MNISECTMY M/L: CPT | Mod: LT

## 2023-07-28 RX ORDER — ONDANSETRON 8 MG/1
1 TABLET, FILM COATED ORAL
Qty: 28 | Refills: 0
Start: 2023-07-28 | End: 2023-08-03

## 2023-07-28 RX ORDER — DOCUSATE SODIUM 100 MG
1 CAPSULE ORAL
Qty: 16 | Refills: 0
Start: 2023-07-28 | End: 2023-08-04

## 2023-07-28 RX ORDER — ASPIRIN/CALCIUM CARB/MAGNESIUM 324 MG
1 TABLET ORAL
Qty: 14 | Refills: 0
Start: 2023-07-28 | End: 2023-08-10

## 2023-07-28 RX ORDER — OXYCODONE HYDROCHLORIDE 5 MG/1
5 TABLET ORAL ONCE
Refills: 0 | Status: DISCONTINUED | OUTPATIENT
Start: 2023-07-28 | End: 2023-07-28

## 2023-07-28 RX ORDER — OXYCODONE HYDROCHLORIDE 5 MG/1
1 TABLET ORAL
Qty: 20 | Refills: 0
Start: 2023-07-28 | End: 2023-08-03

## 2023-07-28 RX ADMIN — OXYCODONE HYDROCHLORIDE 5 MILLIGRAM(S): 5 TABLET ORAL at 13:36

## 2023-07-28 RX ADMIN — OXYCODONE HYDROCHLORIDE 5 MILLIGRAM(S): 5 TABLET ORAL at 14:19

## 2023-07-28 RX ADMIN — OXYCODONE HYDROCHLORIDE 5 MILLIGRAM(S): 5 TABLET ORAL at 14:50

## 2023-07-28 RX ADMIN — OXYCODONE HYDROCHLORIDE 5 MILLIGRAM(S): 5 TABLET ORAL at 13:06

## 2023-07-28 RX ADMIN — SODIUM CHLORIDE 100 MILLILITER(S): 9 INJECTION, SOLUTION INTRAVENOUS at 13:07

## 2023-07-28 NOTE — BRIEF OPERATIVE NOTE - NSICDXBRIEFPREOP_GEN_ALL_CORE_FT
PRE-OP DIAGNOSIS:  Tear of meniscus, medial 28-Jul-2023 12:36:37 medial and lateral meniscus tear Alpesh Meier

## 2023-07-28 NOTE — BRIEF OPERATIVE NOTE - NSICDXBRIEFPROCEDURE_GEN_ALL_CORE_FT
PROCEDURES:  Arthroscopy, knee, with debridement 28-Jul-2023 12:36:13 medial and lateral partial menisectomy Alpesh Meier

## 2023-07-28 NOTE — ASU PATIENT PROFILE, ADULT - NS PRO TALK SOMEONE YN
Refer to Plan of Care Section for Initial Evaluation.    Melissa Harmon, PT, DPT  5/12/2022        
Followed protocol
no

## 2023-07-28 NOTE — BRIEF OPERATIVE NOTE - NSICDXBRIEFPOSTOP_GEN_ALL_CORE_FT
POST-OP DIAGNOSIS:  Tear of meniscus, medial 28-Jul-2023 12:36:54 medial and lateral meniscus tear Alpesh Meier

## 2023-07-28 NOTE — ASU DISCHARGE PLAN (ADULT/PEDIATRIC) - ASU DC SPECIAL INSTRUCTIONSFT
Knee Arthroscopy Instructions    1) Your knee will swell over the next 48hours and you can expect pain to get a bit worse. Ice your Knee plenty, continuously if possible. Fill up a plastic bag, then wrap it in a towel or pillow case.     2) Elevate your leg above your heart with about 3 pillows when you can, when you are in bed or chair. Otherwise you should be up and about, walking as much as you can tolereate. The more you move the better you will do. Weight bearing as tolerated.    3) Expect some bloody drainage. It is normal. It may even soak through the gauze and ACE bandage and look bloody. This is mostly leftover Saline fluid coming out of your knee from surgery. Even a drop of blood can make it look very bloody. Just place another Gauze and another ACE over it and wrap it snug but not overly tight. If it bleeds through the second bandage again, call.    4) Remove bandage in 48hrs and place waterproof band aides. You can shower in 48 hours. No bath. Pat your incisions dry. No creams, no lotions.    5) Only reason to worry would be if pain got so severe that you cannot feel or wiggle your toes, or if your foot is cold. In this case you need to call or come to the ER. But as long as you can feel and wiggle your toes, you are fine.    6) Call the office to schedule a follow up appointment to be seen in 10-14 days. Your Sutures will be removed at that point.    7) A pain Rx was sent electronically to your pharmacy, pick it up on the way home.

## 2023-07-28 NOTE — ASU PATIENT PROFILE, ADULT - FALL HARM RISK - HARM RISK INTERVENTIONS

## 2023-08-01 DIAGNOSIS — K21.9 GASTRO-ESOPHAGEAL REFLUX DISEASE WITHOUT ESOPHAGITIS: ICD-10-CM

## 2023-08-01 DIAGNOSIS — N18.9 CHRONIC KIDNEY DISEASE, UNSPECIFIED: ICD-10-CM

## 2023-08-01 DIAGNOSIS — F41.9 ANXIETY DISORDER, UNSPECIFIED: ICD-10-CM

## 2023-08-01 DIAGNOSIS — Z79.84 LONG TERM (CURRENT) USE OF ORAL HYPOGLYCEMIC DRUGS: ICD-10-CM

## 2023-08-01 DIAGNOSIS — M94.262 CHONDROMALACIA, LEFT KNEE: ICD-10-CM

## 2023-08-01 DIAGNOSIS — M23.242 DERANGEMENT OF ANTERIOR HORN OF LATERAL MENISCUS DUE TO OLD TEAR OR INJURY, LEFT KNEE: ICD-10-CM

## 2023-08-01 DIAGNOSIS — G47.33 OBSTRUCTIVE SLEEP APNEA (ADULT) (PEDIATRIC): ICD-10-CM

## 2023-08-01 DIAGNOSIS — E66.01 MORBID (SEVERE) OBESITY DUE TO EXCESS CALORIES: ICD-10-CM

## 2023-08-01 DIAGNOSIS — Z98.61 CORONARY ANGIOPLASTY STATUS: ICD-10-CM

## 2023-08-01 DIAGNOSIS — Z88.5 ALLERGY STATUS TO NARCOTIC AGENT: ICD-10-CM

## 2023-08-01 DIAGNOSIS — Z98.1 ARTHRODESIS STATUS: ICD-10-CM

## 2023-08-01 DIAGNOSIS — F17.210 NICOTINE DEPENDENCE, CIGARETTES, UNCOMPLICATED: ICD-10-CM

## 2023-08-01 DIAGNOSIS — I13.10 HYPERTENSIVE HEART AND CHRONIC KIDNEY DISEASE WITHOUT HEART FAILURE, WITH STAGE 1 THROUGH STAGE 4 CHRONIC KIDNEY DISEASE, OR UNSPECIFIED CHRONIC KIDNEY DISEASE: ICD-10-CM

## 2023-08-01 DIAGNOSIS — E78.5 HYPERLIPIDEMIA, UNSPECIFIED: ICD-10-CM

## 2023-08-01 DIAGNOSIS — M65.9 SYNOVITIS AND TENOSYNOVITIS, UNSPECIFIED: ICD-10-CM

## 2023-08-01 DIAGNOSIS — F32.A DEPRESSION, UNSPECIFIED: ICD-10-CM

## 2023-08-01 DIAGNOSIS — E11.9 TYPE 2 DIABETES MELLITUS WITHOUT COMPLICATIONS: ICD-10-CM

## 2023-08-01 DIAGNOSIS — I25.2 OLD MYOCARDIAL INFARCTION: ICD-10-CM

## 2023-08-03 RX ORDER — OXYCODONE AND ACETAMINOPHEN 5; 325 MG/1; MG/1
5-325 TABLET ORAL
Qty: 42 | Refills: 0 | Status: ACTIVE | COMMUNITY
Start: 2023-08-03 | End: 1900-01-01

## 2023-08-08 LAB — SURGICAL PATHOLOGY STUDY: SIGNIFICANT CHANGE UP

## 2023-08-14 ENCOUNTER — APPOINTMENT (OUTPATIENT)
Dept: ORTHOPEDIC SURGERY | Facility: CLINIC | Age: 68
End: 2023-08-14
Payer: MEDICARE

## 2023-08-14 VITALS — HEIGHT: 63 IN | WEIGHT: 226 LBS | BODY MASS INDEX: 40.04 KG/M2

## 2023-08-14 PROCEDURE — 99024 POSTOP FOLLOW-UP VISIT: CPT

## 2023-08-14 NOTE — ASSESSMENT
[FreeTextEntry1] : The patient is 2 weeks s/p a left knee arthroscopy and meniscectomy on 7/28/23. The patient denies fever, chills, CP, SOB. The patient denies drainage or discharge from their incision. The patient is doing well postoperatively. She denies new trauma. She denies paresthesias. The patient is able to ambulate and uses a cane in public. She can complete ADLs with mild pain. She stopped narcotic use 3 days ago and has not tried advil or tylenol.   Left knee exam: The patient is in no apparent distress. Neurovascularly intact. Sensation to left lower extremity. 2+ pulses to posterior tibialis. Operative incision is clean, dry, and intact. No active drainage or discharge. Sutures removed. Steri-strips placed. No signs of infection or DVT. Tender to palpation to medial joint line. Ranging  0-125     . + Crepitus. - pain with varus/valgus stress. - anterior/posterior drawer.  The patient is doing well postoperatively. She denies new trauma. She will use oral AIs as needed. She is able to complete ADLs. She will use the knee as needed and call if she wishes to consider PT. Follow up 4 weeks.

## 2023-08-14 NOTE — HISTORY OF PRESENT ILLNESS
[4] : 4 [Dull/Aching] : dull/aching [Localized] : localized [Constant] : constant [Standing] : standing [Walking] : walking [Stairs] : stairs [] : This patient has had an injection before: no [FreeTextEntry1] : L knee [FreeTextEntry5] : here for post op #1 for left knee, has soreness and pain. [de-identified] : 7/28/23 [de-identified] : L knee scope

## 2023-08-15 PROBLEM — Z86.19 PERSONAL HISTORY OF OTHER INFECTIOUS AND PARASITIC DISEASES: Chronic | Status: ACTIVE | Noted: 2023-07-18

## 2023-08-15 PROBLEM — S83.209A UNSPECIFIED TEAR OF UNSPECIFIED MENISCUS, CURRENT INJURY, UNSPECIFIED KNEE, INITIAL ENCOUNTER: Chronic | Status: ACTIVE | Noted: 2023-07-18

## 2023-08-15 PROBLEM — E66.01 MORBID (SEVERE) OBESITY DUE TO EXCESS CALORIES: Chronic | Status: ACTIVE | Noted: 2023-07-18

## 2023-08-15 PROBLEM — N28.9 DISORDER OF KIDNEY AND URETER, UNSPECIFIED: Chronic | Status: ACTIVE | Noted: 2023-07-18

## 2023-08-15 PROBLEM — K31.84 GASTROPARESIS: Chronic | Status: ACTIVE | Noted: 2023-07-18

## 2023-08-15 PROBLEM — K57.90 DIVERTICULOSIS OF INTESTINE, PART UNSPECIFIED, WITHOUT PERFORATION OR ABSCESS WITHOUT BLEEDING: Chronic | Status: ACTIVE | Noted: 2023-07-18

## 2023-08-15 PROBLEM — I25.2 OLD MYOCARDIAL INFARCTION: Chronic | Status: ACTIVE | Noted: 2023-07-18

## 2023-08-15 PROBLEM — M19.90 UNSPECIFIED OSTEOARTHRITIS, UNSPECIFIED SITE: Chronic | Status: ACTIVE | Noted: 2023-07-18

## 2023-08-30 NOTE — ASU PATIENT PROFILE, ADULT - NSTOBACCO QUIT READY_GEN_A_CORE_SD
Occupational Therapy    Visit Type: treatment  SUBJECTIVE  Patient agreed to participate in therapy this date.  Patient more alert and agreeable, following commands approximatley 50% of the time during ADL activities and sequencing routine tasks with minimal to moderate cueing.   Patient / Family Goal: unable to state  Patient rubbing his left knee repeatedly and grimacing when flexing or extending it and with sit to stand transfers. He does say \"yes\"  when asked if his knees hurt and indicated both knees by pointing and rubbing  them. Nursing informed.     OBJECTIVE     Cognitive Status   Level of Consciousness   - alert  Arousal Alertness   - appropriate responses to stimuli  Affect/Behavior    - confused and cooperative  Orientation    - Oriented to: person  Attention Span    - Attention: impaired - attends with cues to redirect   - Attention impairment: distractibility and reduced memory  Following Direction   - follows one step commands with increased time and follows one step commands with repetition  Safety Awareness/Insight   - decreased awareness of need for assistance and decreased awareness of need for safety    Patient Activity Tolerance: 1 to 1 activity to rest         Bed Mobility  - Supine to sit: supervision, with verbal cues  Supervision to move to edge of bed with significant increased time and cues.   Transfers  Assistive devices: 2-wheeled walker, gait belt  - Sit to stand: minimal assist, moderate assist, with tactile cues, with verbal cues  - Stand to sit: minimal assist, moderate assist, with tactile cues, with verbal cues  Min assist to stand from edge of bed and moderate to max assist to stand from the low toilet. He was unable to follow cues to use the grab bar next to the toilet and required hand over hand assist to place left hand on the bar and right hand on the walker.         Functional Ambulation  - Assistance: contact guard/touching/steadying assist, with verbal cues and with tactile  cues  - Assistive device: 2-wheeled walker and gait belt  - Distance (ft):5; 5  - Surface: even  Patient ambulated bed to toilet and toilet to recliner with CGA.   Activities of Daily Living (ADLs)  Upper Body Dressing:  - Assist: minimal assist, with tactile cues and with verbal cues  - Position: chair  - Assist needed for: increased time to complete, pull around back and fasteners  Lower Body Dressing:   - Assist: maximal assist  - Assist needed for: thread left lower extremity into underwear and pull up over hips  Toileting:   - Toilet transfer:        - Assist: moderate assist, with verbal cues and with tactile cues       - Device: gait belt       - Equipment: grab bar use  - Assist: moderate assist  - Assist needed for: increased time to complete, clothing management down and perineal hygiene  Bathing:   - Assist: minimal assist, moderate assist, with tactile cues and with verbal cues  - Position: chair  - Assist needed for: increased time to complete and perineal area  Low body dressing and pericares completed at the toilet. Difficulty with toileting tasks due to small size of bathroom and low toilet.   He requird cues for upper body bathing and to wash his upper legs and knees.   Interventions    Training provided: ADL training, activity tolerance, balance retraining, compensatory techniques, positioning, safety training, transfer training, bed mobility training and functional ambulation  Skilled input: verbal instruction/cues and tactile instruction/cues  Verbal Consent: Writer verbally educated and received verbal consent for hand placement, positioning of patient, and techniques to be performed today from patient for clothing adjustments for techniques and therapist position for techniques as described above and how they are pertinent to the patient's plan of care.         Education:   - Present and ready to learn: patient    ASSESSMENT   Interferring components: cognitive deficits, decreased activity  tolerance, decreased insight into deficit and medical status limitations    Discharge needs based on today's assessment:  - Current level of function: slightly below baseline level of function  - Therapy needs at discharge: therapy 1-3 times per week  - Activities of daily living (ADLs) requiring support at discharge: bed mobility, transfers, ambulation, dressing, grooming, bathing and toileting  - Impairments that require further therapy intervention: safety awareness, activity tolerance, cognition, strength, balance and executive functioning  Visit #: 2    AM-PAC  - Prior Level of Function: Needs a little help ([facility] 12-21)       Key: MOD A=moderate assistance, IND/MOD I=independent/modified independent  - Generalized Current Level of Function     - Current Self-Cares: 14       Scoring Key= >21 Modified Independent; 20-21 Supervision; 18-19 Minimal assist; 13-18 Moderate assist; 9-12 Max assist; <9 Total assist          PLAN (while hospitalized)  Suggestions for next session as indicated: Up to recliner for bathing and dressing or to sink for grooming, toileting.  Will need commode or elevated toilet as current toilet is too low     OT Frequency: 3-5 x per week      PT/OT Mobility Equipment for Discharge: continue to assess  PT/OT ADL Equipment for Discharge: continue to assess  Agreement to plan and goals: patient agrees with goals and treatment plan      GOALS  Review Date: 9/4/2023  Long Term Goals: (to be met by time of discharge from hospital)  Feeding: Patient will complete feeding tasks supervision.  Grooming: Patient will complete grooming tasks supervision.  Upper body dressing: Patient will complete upper body dressing supervision.  Lower body dressing: Patient will complete lower body dressing minimal assist.  Toileting: Patient will complete toileting supervision.  Bathing: Patient will complete bathingminimal assist Toilet transfer: Patient will complete toilet transfer with 2-wheeled walker,  supervision.   Home setting transfer: Patient will complete home setting transfers with 2-wheeled walker, contact guard or touching/steadying.         Documented in the chart in the following areas: Assessment/Plan.    Patient at End of Session:   Location: in chair  Safety measures: alarm system in place/re-engaged, call light within reach, equipment intact and sitter present      Therapy procedure time and total treatment time can be found documented on the Time Entry flowsheet   not motivated to quit

## 2023-09-12 NOTE — ED ADULT TRIAGE NOTE - AS TEMP SITE
Call to kyleighTomeka. Aware we have not been able to contact [facility] yet for the PA. Tomeka aware we will try now again. Aware of patient's appt 9-20-23.     Call to [facility] [phone].  By automation was transferred to another number for PA.  [employer] PA-Spoke with Ronny.  Ronny said the PA form can be faxed to Dr. Zapata. Our fax number provided.         DaughterTomeka aware we are awaiting the fax.   FYI to Dr. Zapata                                oral

## 2023-09-27 ENCOUNTER — APPOINTMENT (OUTPATIENT)
Dept: ORTHOPEDIC SURGERY | Facility: CLINIC | Age: 68
End: 2023-09-27
Payer: MEDICARE

## 2023-09-27 VITALS — HEIGHT: 63 IN | WEIGHT: 218 LBS | BODY MASS INDEX: 38.62 KG/M2

## 2023-09-27 PROCEDURE — 99024 POSTOP FOLLOW-UP VISIT: CPT

## 2023-11-16 ENCOUNTER — APPOINTMENT (OUTPATIENT)
Dept: ORTHOPEDIC SURGERY | Facility: CLINIC | Age: 68
End: 2023-11-16
Payer: MEDICARE

## 2023-11-16 VITALS — HEIGHT: 63 IN | WEIGHT: 216 LBS | BODY MASS INDEX: 38.27 KG/M2

## 2023-11-16 PROCEDURE — 20611 DRAIN/INJ JOINT/BURSA W/US: CPT | Mod: LT

## 2023-11-16 PROCEDURE — 99213 OFFICE O/P EST LOW 20 MIN: CPT | Mod: 25

## 2024-01-25 ENCOUNTER — ASOB RESULT (OUTPATIENT)
Age: 69
End: 2024-01-25

## 2024-01-25 ENCOUNTER — APPOINTMENT (OUTPATIENT)
Dept: ANTEPARTUM | Facility: CLINIC | Age: 69
End: 2024-01-25
Payer: MEDICARE

## 2024-01-25 PROCEDURE — 76830 TRANSVAGINAL US NON-OB: CPT

## 2024-01-25 PROCEDURE — 76856 US EXAM PELVIC COMPLETE: CPT | Mod: 59

## 2024-02-27 NOTE — H&P PST ADULT - EYES
Review of cardiology note acceptable/moderate risk to proceed with surgery from cardiology perspective   PERRL/EOMI

## 2024-03-06 ENCOUNTER — APPOINTMENT (OUTPATIENT)
Dept: ORTHOPEDIC SURGERY | Facility: CLINIC | Age: 69
End: 2024-03-06
Payer: MEDICARE

## 2024-03-06 VITALS — HEIGHT: 63 IN | WEIGHT: 216 LBS | BODY MASS INDEX: 38.27 KG/M2

## 2024-03-06 DIAGNOSIS — S83.272A COMPLEX TEAR OF LATERAL MENISCUS, CURRENT INJURY, LEFT KNEE, INITIAL ENCOUNTER: ICD-10-CM

## 2024-03-06 DIAGNOSIS — M17.12 UNILATERAL PRIMARY OSTEOARTHRITIS, LEFT KNEE: ICD-10-CM

## 2024-03-06 PROCEDURE — 99213 OFFICE O/P EST LOW 20 MIN: CPT | Mod: 25

## 2024-03-06 PROCEDURE — 20611 DRAIN/INJ JOINT/BURSA W/US: CPT | Mod: LT

## 2024-03-06 NOTE — PROCEDURE
[Large Joint Injection] : Large joint injection [Left] : of the left [Knee] : knee [Pain] : pain [Inflammation] : inflammation [Repeat series performed] : repeat series performed [Alcohol] : alcohol [Betadine] : betadine [Ethyl Chloride sprayed topically] : ethyl chloride sprayed topically [Sterile technique used] : sterile technique used [___ cc    1%] : Lidocaine ~Vcc of 1%  [___ cc    0.25%] : Bupivacaine (Marcaine) ~Vcc of 0.25%  [___ cc    40mg] : Methylprednisolone (Depomedrol) ~Vcc of 40 mg  [Call if redness, pain or fever occur] : call if redness, pain or fever occur [] : Patient tolerated procedure well [Previous OTC use and PT nontherapeutic] : patient has tried OTC's including aspirin, Ibuprofen, Aleve, etc or prescription NSAIDS, and/or exercises at home and/or physical therapy without satisfactory response [Patient had decreased mobility in the joint] : patient had decreased mobility in the joint [Risks, benefits, alternatives discussed / Verbal consent obtained] : the risks benefits, and alternatives have been discussed, and verbal consent was obtained [All ultrasound images have been permanently captured and stored accordingly in our picture archiving and communication system] : All ultrasound images have been permanently captured and stored accordingly in our picture archiving and communication system [Altered anatomic landmarks d/t erosive arthritis] : altered anatomic landmarks d/t erosive arthritis [Visualization of the needle and placement of injection was performed without complication] : visualization of the needle and placement of injection was performed without complication

## 2024-03-06 NOTE — ASSESSMENT
[FreeTextEntry1] : She is now 6 months out from her left knee arthroscopy and partial lateral meniscectomy on 7/28/23. She had moderate arthritic changes. She took a fall after surgery and this is slowed down her recovery. She has good range of motion but still somewhat swollen and painful. She has pain with walking and stairs. The patient has had good success with injections in the past and wishes to try another CSI today. She has stopped PT at this time.  Left knee exam: Examination left knee reveals well-healed scars with no signs of infection or DVT. Range of motion 0 to 120 degrees with pain at the extremes and there is a 1+ effusion. There is no instability or apprehension. There is diffuse joint line tenderness particular over the lateral side.  The patient received a left knee CSI. She tolerated it well. Ultrasound guidance was used. She will return as needed.

## 2024-03-06 NOTE — HISTORY OF PRESENT ILLNESS
[7] : 7 [0] : 0 [Dull/Aching] : dull/aching [Sharp] : sharp [Intermittent] : intermittent [Injection therapy] : injection therapy [Walking] : walking [] : no [FreeTextEntry5] : 69 y/o F presents for f/u eval of the Lt. knee today. Previous tx CSI with good relief.

## 2024-04-12 NOTE — DISCHARGE NOTE PROVIDER - DISCHARGE DIET
I certify I provided patient choice and a list to the patient/family of CMS rated Home Health, SNF, IRF, LTACH, longterm Nursing Homes.  Patient/Family signed Patient's Choice Disclosure Form choosing the first  available SNF.      04/12/24 0983   Post-Acute Status   Post-Acute Authorization Placement   Post-Acute Placement Status Referrals Sent   Patient choice form signed by patient/caregiver List with quality metrics by geographic area provided;List from CMS Compare;List from System Post-Acute Care   Discharge Delays (!) Post-Acute Set-up   Discharge Plan   Discharge Plan A Skilled Nursing Facility        Consistent Carbohydrate Diabetic Diets

## 2024-05-02 ENCOUNTER — APPOINTMENT (OUTPATIENT)
Dept: ANTEPARTUM | Facility: CLINIC | Age: 69
End: 2024-05-02
Payer: MEDICARE

## 2024-05-02 ENCOUNTER — ASOB RESULT (OUTPATIENT)
Age: 69
End: 2024-05-02

## 2024-05-02 PROCEDURE — 76857 US EXAM PELVIC LIMITED: CPT | Mod: 59

## 2024-05-02 PROCEDURE — 76830 TRANSVAGINAL US NON-OB: CPT

## 2024-05-16 ENCOUNTER — OUTPATIENT (OUTPATIENT)
Dept: OUTPATIENT SERVICES | Facility: HOSPITAL | Age: 69
LOS: 1 days | End: 2024-05-16
Payer: MEDICARE

## 2024-05-16 VITALS
SYSTOLIC BLOOD PRESSURE: 122 MMHG | HEART RATE: 59 BPM | WEIGHT: 211.86 LBS | RESPIRATION RATE: 16 BRPM | TEMPERATURE: 98 F | OXYGEN SATURATION: 97 % | HEIGHT: 63 IN | DIASTOLIC BLOOD PRESSURE: 62 MMHG

## 2024-05-16 DIAGNOSIS — Z98.891 HISTORY OF UTERINE SCAR FROM PREVIOUS SURGERY: Chronic | ICD-10-CM

## 2024-05-16 DIAGNOSIS — Z98.890 OTHER SPECIFIED POSTPROCEDURAL STATES: Chronic | ICD-10-CM

## 2024-05-16 DIAGNOSIS — N85.00 ENDOMETRIAL HYPERPLASIA, UNSPECIFIED: ICD-10-CM

## 2024-05-16 DIAGNOSIS — Z98.1 ARTHRODESIS STATUS: Chronic | ICD-10-CM

## 2024-05-16 DIAGNOSIS — Z01.818 ENCOUNTER FOR OTHER PREPROCEDURAL EXAMINATION: ICD-10-CM

## 2024-05-16 LAB
ANION GAP SERPL CALC-SCNC: 6 MMOL/L — SIGNIFICANT CHANGE UP (ref 5–17)
APPEARANCE UR: CLEAR — SIGNIFICANT CHANGE UP
APTT BLD: 30.7 SEC — SIGNIFICANT CHANGE UP (ref 24.5–35.6)
BACTERIA # UR AUTO: ABNORMAL /HPF
BASOPHILS # BLD AUTO: 0.05 K/UL — SIGNIFICANT CHANGE UP (ref 0–0.2)
BASOPHILS NFR BLD AUTO: 0.5 % — SIGNIFICANT CHANGE UP (ref 0–2)
BILIRUB UR-MCNC: NEGATIVE — SIGNIFICANT CHANGE UP
BLD GP AB SCN SERPL QL: SIGNIFICANT CHANGE UP
BUN SERPL-MCNC: 23 MG/DL — SIGNIFICANT CHANGE UP (ref 7–23)
CALCIUM SERPL-MCNC: 10.2 MG/DL — HIGH (ref 8.5–10.1)
CAST: 0 /LPF — SIGNIFICANT CHANGE UP (ref 0–4)
CHLORIDE SERPL-SCNC: 109 MMOL/L — HIGH (ref 96–108)
CO2 SERPL-SCNC: 25 MMOL/L — SIGNIFICANT CHANGE UP (ref 22–31)
COLOR SPEC: YELLOW — SIGNIFICANT CHANGE UP
CREAT SERPL-MCNC: 1.12 MG/DL — SIGNIFICANT CHANGE UP (ref 0.5–1.3)
DIFF PNL FLD: NEGATIVE — SIGNIFICANT CHANGE UP
EGFR: 53 ML/MIN/1.73M2 — LOW
EOSINOPHIL # BLD AUTO: 0.2 K/UL — SIGNIFICANT CHANGE UP (ref 0–0.5)
EOSINOPHIL NFR BLD AUTO: 2.1 % — SIGNIFICANT CHANGE UP (ref 0–6)
GLUCOSE SERPL-MCNC: 213 MG/DL — HIGH (ref 70–99)
GLUCOSE UR QL: >=1000 MG/DL
HCT VFR BLD CALC: 43.7 % — SIGNIFICANT CHANGE UP (ref 34.5–45)
HGB BLD-MCNC: 14.3 G/DL — SIGNIFICANT CHANGE UP (ref 11.5–15.5)
IMM GRANULOCYTES NFR BLD AUTO: 0.3 % — SIGNIFICANT CHANGE UP (ref 0–0.9)
INR BLD: 0.91 RATIO — SIGNIFICANT CHANGE UP (ref 0.85–1.18)
KETONES UR-MCNC: NEGATIVE MG/DL — SIGNIFICANT CHANGE UP
LEUKOCYTE ESTERASE UR-ACNC: ABNORMAL
LYMPHOCYTES # BLD AUTO: 2.41 K/UL — SIGNIFICANT CHANGE UP (ref 1–3.3)
LYMPHOCYTES # BLD AUTO: 24.9 % — SIGNIFICANT CHANGE UP (ref 13–44)
MCHC RBC-ENTMCNC: 28.3 PG — SIGNIFICANT CHANGE UP (ref 27–34)
MCHC RBC-ENTMCNC: 32.7 GM/DL — SIGNIFICANT CHANGE UP (ref 32–36)
MCV RBC AUTO: 86.5 FL — SIGNIFICANT CHANGE UP (ref 80–100)
MONOCYTES # BLD AUTO: 0.56 K/UL — SIGNIFICANT CHANGE UP (ref 0–0.9)
MONOCYTES NFR BLD AUTO: 5.8 % — SIGNIFICANT CHANGE UP (ref 2–14)
NEUTROPHILS # BLD AUTO: 6.41 K/UL — SIGNIFICANT CHANGE UP (ref 1.8–7.4)
NEUTROPHILS NFR BLD AUTO: 66.4 % — SIGNIFICANT CHANGE UP (ref 43–77)
NITRITE UR-MCNC: NEGATIVE — SIGNIFICANT CHANGE UP
PH UR: 5 — SIGNIFICANT CHANGE UP (ref 5–8)
PLATELET # BLD AUTO: 268 K/UL — SIGNIFICANT CHANGE UP (ref 150–400)
POTASSIUM SERPL-MCNC: 4.3 MMOL/L — SIGNIFICANT CHANGE UP (ref 3.5–5.3)
POTASSIUM SERPL-SCNC: 4.3 MMOL/L — SIGNIFICANT CHANGE UP (ref 3.5–5.3)
PROT UR-MCNC: NEGATIVE MG/DL — SIGNIFICANT CHANGE UP
PROTHROM AB SERPL-ACNC: 10.3 SEC — SIGNIFICANT CHANGE UP (ref 9.5–13)
RBC # BLD: 5.05 M/UL — SIGNIFICANT CHANGE UP (ref 3.8–5.2)
RBC # FLD: 14.8 % — HIGH (ref 10.3–14.5)
RBC CASTS # UR COMP ASSIST: 0 /HPF — SIGNIFICANT CHANGE UP (ref 0–4)
SODIUM SERPL-SCNC: 140 MMOL/L — SIGNIFICANT CHANGE UP (ref 135–145)
SP GR SPEC: 1.03 — SIGNIFICANT CHANGE UP (ref 1–1.03)
SQUAMOUS # UR AUTO: 4 /HPF — SIGNIFICANT CHANGE UP (ref 0–5)
UROBILINOGEN FLD QL: 0.2 MG/DL — SIGNIFICANT CHANGE UP (ref 0.2–1)
WBC # BLD: 9.66 K/UL — SIGNIFICANT CHANGE UP (ref 3.8–10.5)
WBC # FLD AUTO: 9.66 K/UL — SIGNIFICANT CHANGE UP (ref 3.8–10.5)
WBC UR QL: 14 /HPF — HIGH (ref 0–5)

## 2024-05-16 PROCEDURE — 85730 THROMBOPLASTIN TIME PARTIAL: CPT

## 2024-05-16 PROCEDURE — 93005 ELECTROCARDIOGRAM TRACING: CPT

## 2024-05-16 PROCEDURE — 86901 BLOOD TYPING SEROLOGIC RH(D): CPT

## 2024-05-16 PROCEDURE — 86850 RBC ANTIBODY SCREEN: CPT

## 2024-05-16 PROCEDURE — 86900 BLOOD TYPING SEROLOGIC ABO: CPT

## 2024-05-16 PROCEDURE — 99214 OFFICE O/P EST MOD 30 MIN: CPT | Mod: 25

## 2024-05-16 PROCEDURE — 81001 URINALYSIS AUTO W/SCOPE: CPT

## 2024-05-16 PROCEDURE — 85025 COMPLETE CBC W/AUTO DIFF WBC: CPT

## 2024-05-16 PROCEDURE — 83036 HEMOGLOBIN GLYCOSYLATED A1C: CPT

## 2024-05-16 PROCEDURE — 80048 BASIC METABOLIC PNL TOTAL CA: CPT

## 2024-05-16 PROCEDURE — 85610 PROTHROMBIN TIME: CPT

## 2024-05-16 PROCEDURE — 93010 ELECTROCARDIOGRAM REPORT: CPT

## 2024-05-16 PROCEDURE — 36415 COLL VENOUS BLD VENIPUNCTURE: CPT

## 2024-05-16 NOTE — H&P PST ADULT - NSICDXFAMILYHX_GEN_ALL_CORE_FT
FAMILY HISTORY:  Father  Still living? Unknown  Family history of DVT, Age at diagnosis: Age Unknown    Mother  Still living? Unknown  FH: kidney disease, Age at diagnosis: Age Unknown

## 2024-05-16 NOTE — H&P PST ADULT - ASSESSMENT
Plan:  1. PST instructions given ; NPO status/  instructions to be given by ASU   2. Pt instructed to take following meds on day of surgery:   3. Pt instructed to take routine evening medications unless indicated   4. Stop NSAIDS ( Aspirin Alev Motrin Mobic Diclofenac), herbal supplements , MVI , Vitamin fish oil 7 days prior to surgery  unless   directed by surgeon or cardiologist;   5. Medical Optimization  with    6. EZ wash instructions given & mupirocin instructions given  7. Labs EKG CXR as per surgeon request   8. Pt denies covid symptoms shortness of breath fever cough      69  year old female with endometrial thickening; Patient was having frequent yeast infections and PCP requested OB-GYN; sonogram was done and endometrial thickening was noted; denies post menopausal bleeding; she presents to PST for planned D&C and possible polypectomy         Plan:  1. PST instructions given ; NPO status/  instructions to be given by ASU   2. Pt instructed to take following meds on day of surgery: none  3. Pt instructed to take routine evening medications unless indicated   4. Stop NSAIDS ( Aspirin Alev Motrin Mobic Diclofenac), herbal supplements , MVI , Vitamin fish oil 7 days prior to surgery  unless   directed by surgeon or cardiologist;   5. Medical Optimization  with Dr Massey   6. Pt denies covid symptoms shortness of breath fever cough   7. Labs EKG  as per surgeon request

## 2024-05-16 NOTE — H&P PST ADULT - HISTORY OF PRESENT ILLNESS
69  year old female with endometrial thickening; Patient was having frequent yeast infections and PCP requested OB-GYN; sonogram was done and endometrial thickening was noted; denies post menopausal bleeding; she presents to PST for planned D&C and possible polypectomy

## 2024-05-16 NOTE — H&P PST ADULT - NSICDXPASTMEDICALHX_GEN_ALL_CORE_FT
PAST MEDICAL HISTORY:  Anxiety and depression     Arthritis     At risk for obstructive sleep apnea DHRUV precautions. Pt >3 criteria on STOP-Bang questionnaire.    CAD (coronary artery disease)     Diverticulosis     DM type 2 (diabetes mellitus, type 2)     Gastroparesis     GERD (gastroesophageal reflux disease)     Heart murmur     Hiatal hernia     History of diverticulitis     History of hepatitis C     History of myocardial infarction     HLD (hyperlipidemia)     HTN (hypertension)     Insomnia     Kidney disease     Lower back pain     Marijuana use     Morbid obesity     Renal calculi     Stage 3 chronic kidney disease     Torn meniscus     Umbilical hernia      PAST MEDICAL HISTORY:  Anxiety and depression     Arthritis     At risk for obstructive sleep apnea DHRUV precautions. Pt >3 criteria on STOP-Bang questionnaire.    CAD (coronary artery disease)     Diverticulosis     DM type 2 (diabetes mellitus, type 2)     Endometrial thickening on ultrasound     Gastroparesis     GERD (gastroesophageal reflux disease)     Heart murmur     Hiatal hernia     History of diverticulitis     History of hepatitis C     History of myocardial infarction     HLD (hyperlipidemia)     HTN (hypertension)     Insomnia     Kidney disease     Lower back pain     Marijuana use     Morbid obesity     Renal calculi     Stage 3 chronic kidney disease     Torn meniscus     Umbilical hernia

## 2024-05-16 NOTE — H&P PST ADULT - NSICDXPASTSURGICALHX_GEN_ALL_CORE_FT
PAST SURGICAL HISTORY:  H/O arthroscopy of left knee     History of 2  sections     History of colonoscopy     History of lumbar fusion     History of lumbar laminectomy     History of percutaneous angioplasty

## 2024-05-17 DIAGNOSIS — Z01.818 ENCOUNTER FOR OTHER PREPROCEDURAL EXAMINATION: ICD-10-CM

## 2024-05-17 DIAGNOSIS — N85.00 ENDOMETRIAL HYPERPLASIA, UNSPECIFIED: ICD-10-CM

## 2024-05-17 LAB
A1C WITH ESTIMATED AVERAGE GLUCOSE RESULT: 8.1 % — HIGH (ref 4–5.6)
ESTIMATED AVERAGE GLUCOSE: 186 MG/DL — HIGH (ref 68–114)

## 2024-05-17 NOTE — CHART NOTE - NSCHARTNOTEFT_GEN_A_CORE
Patient with a1c 8.1. results faxed to PMD and Surgeon case discussed and clearance with PMD scheduled 5/20/24.  Case discussed with anesthesiologist Dr. Burgess urgent and emergent case ok to proceed.

## 2024-05-22 ENCOUNTER — TRANSCRIPTION ENCOUNTER (OUTPATIENT)
Age: 69
End: 2024-05-22

## 2024-05-22 ENCOUNTER — OUTPATIENT (OUTPATIENT)
Dept: INPATIENT UNIT | Facility: HOSPITAL | Age: 69
LOS: 1 days | Discharge: HOME | End: 2024-05-22
Payer: MEDICARE

## 2024-05-22 VITALS
HEART RATE: 61 BPM | RESPIRATION RATE: 18 BRPM | TEMPERATURE: 97 F | SYSTOLIC BLOOD PRESSURE: 153 MMHG | HEIGHT: 63 IN | DIASTOLIC BLOOD PRESSURE: 54 MMHG | OXYGEN SATURATION: 97 % | WEIGHT: 211.86 LBS

## 2024-05-22 VITALS
OXYGEN SATURATION: 98 % | TEMPERATURE: 98 F | DIASTOLIC BLOOD PRESSURE: 62 MMHG | SYSTOLIC BLOOD PRESSURE: 134 MMHG | RESPIRATION RATE: 18 BRPM | HEART RATE: 64 BPM

## 2024-05-22 DIAGNOSIS — N85.8 OTHER SPECIFIED NONINFLAMMATORY DISORDERS OF UTERUS: ICD-10-CM

## 2024-05-22 DIAGNOSIS — E66.01 MORBID (SEVERE) OBESITY DUE TO EXCESS CALORIES: ICD-10-CM

## 2024-05-22 DIAGNOSIS — Z79.84 LONG TERM (CURRENT) USE OF ORAL HYPOGLYCEMIC DRUGS: ICD-10-CM

## 2024-05-22 DIAGNOSIS — Z98.890 OTHER SPECIFIED POSTPROCEDURAL STATES: Chronic | ICD-10-CM

## 2024-05-22 DIAGNOSIS — F17.210 NICOTINE DEPENDENCE, CIGARETTES, UNCOMPLICATED: ICD-10-CM

## 2024-05-22 DIAGNOSIS — F41.9 ANXIETY DISORDER, UNSPECIFIED: ICD-10-CM

## 2024-05-22 DIAGNOSIS — I25.10 ATHEROSCLEROTIC HEART DISEASE OF NATIVE CORONARY ARTERY WITHOUT ANGINA PECTORIS: ICD-10-CM

## 2024-05-22 DIAGNOSIS — N18.31 CHRONIC KIDNEY DISEASE, STAGE 3A: ICD-10-CM

## 2024-05-22 DIAGNOSIS — Z88.5 ALLERGY STATUS TO NARCOTIC AGENT: ICD-10-CM

## 2024-05-22 DIAGNOSIS — Z79.85 LONG-TERM (CURRENT) USE OF INJECTABLE NON-INSULIN ANTIDIABETIC DRUGS: ICD-10-CM

## 2024-05-22 DIAGNOSIS — M19.90 UNSPECIFIED OSTEOARTHRITIS, UNSPECIFIED SITE: ICD-10-CM

## 2024-05-22 DIAGNOSIS — E11.22 TYPE 2 DIABETES MELLITUS WITH DIABETIC CHRONIC KIDNEY DISEASE: ICD-10-CM

## 2024-05-22 DIAGNOSIS — G47.33 OBSTRUCTIVE SLEEP APNEA (ADULT) (PEDIATRIC): ICD-10-CM

## 2024-05-22 DIAGNOSIS — I12.9 HYPERTENSIVE CHRONIC KIDNEY DISEASE WITH STAGE 1 THROUGH STAGE 4 CHRONIC KIDNEY DISEASE, OR UNSPECIFIED CHRONIC KIDNEY DISEASE: ICD-10-CM

## 2024-05-22 DIAGNOSIS — N85.00 ENDOMETRIAL HYPERPLASIA, UNSPECIFIED: ICD-10-CM

## 2024-05-22 DIAGNOSIS — I25.2 OLD MYOCARDIAL INFARCTION: ICD-10-CM

## 2024-05-22 DIAGNOSIS — Z98.891 HISTORY OF UTERINE SCAR FROM PREVIOUS SURGERY: Chronic | ICD-10-CM

## 2024-05-22 DIAGNOSIS — N84.0 POLYP OF CORPUS UTERI: ICD-10-CM

## 2024-05-22 DIAGNOSIS — Z98.51 TUBAL LIGATION STATUS: ICD-10-CM

## 2024-05-22 DIAGNOSIS — Z98.1 ARTHRODESIS STATUS: Chronic | ICD-10-CM

## 2024-05-22 DIAGNOSIS — F32.A DEPRESSION, UNSPECIFIED: ICD-10-CM

## 2024-05-22 DIAGNOSIS — Z98.1 ARTHRODESIS STATUS: ICD-10-CM

## 2024-05-22 DIAGNOSIS — E78.5 HYPERLIPIDEMIA, UNSPECIFIED: ICD-10-CM

## 2024-05-22 LAB — GLUCOSE BLDC GLUCOMTR-MCNC: 225 MG/DL — HIGH (ref 70–99)

## 2024-05-22 PROCEDURE — 88305 TISSUE EXAM BY PATHOLOGIST: CPT

## 2024-05-22 PROCEDURE — 88305 TISSUE EXAM BY PATHOLOGIST: CPT | Mod: 26

## 2024-05-22 PROCEDURE — 82962 GLUCOSE BLOOD TEST: CPT

## 2024-05-22 RX ORDER — ONDANSETRON 8 MG/1
4 TABLET, FILM COATED ORAL ONCE
Refills: 0 | Status: DISCONTINUED | OUTPATIENT
Start: 2024-05-22 | End: 2024-05-22

## 2024-05-22 RX ORDER — FENTANYL CITRATE 50 UG/ML
50 INJECTION INTRAVENOUS
Refills: 0 | Status: DISCONTINUED | OUTPATIENT
Start: 2024-05-22 | End: 2024-05-22

## 2024-05-22 RX ORDER — DULAGLUTIDE 4.5 MG/.5ML
1.5 INJECTION, SOLUTION SUBCUTANEOUS
Refills: 0 | DISCHARGE

## 2024-05-22 RX ORDER — SERTRALINE 25 MG/1
1 TABLET, FILM COATED ORAL
Refills: 0 | DISCHARGE

## 2024-05-22 RX ORDER — METFORMIN HYDROCHLORIDE 850 MG/1
2 TABLET ORAL
Refills: 0 | DISCHARGE

## 2024-05-22 RX ORDER — EMPAGLIFLOZIN 10 MG/1
1 TABLET, FILM COATED ORAL
Refills: 0 | DISCHARGE

## 2024-05-22 RX ORDER — ATORVASTATIN CALCIUM 80 MG/1
1 TABLET, FILM COATED ORAL
Qty: 0 | Refills: 0 | DISCHARGE

## 2024-05-22 RX ORDER — FENTANYL CITRATE 50 UG/ML
25 INJECTION INTRAVENOUS
Refills: 0 | Status: DISCONTINUED | OUTPATIENT
Start: 2024-05-22 | End: 2024-05-22

## 2024-05-22 RX ORDER — FENOFIBRATE,MICRONIZED 130 MG
1 CAPSULE ORAL
Qty: 0 | Refills: 0 | DISCHARGE

## 2024-05-22 RX ORDER — METFORMIN HYDROCHLORIDE 850 MG/1
1 TABLET ORAL
Refills: 0 | DISCHARGE

## 2024-05-22 RX ORDER — ZOLPIDEM TARTRATE 10 MG/1
1 TABLET ORAL
Qty: 0 | Refills: 0 | DISCHARGE

## 2024-05-22 RX ORDER — GLIMEPIRIDE 1 MG
1 TABLET ORAL
Qty: 0 | Refills: 0 | DISCHARGE

## 2024-05-22 RX ORDER — SODIUM CHLORIDE 9 MG/ML
1000 INJECTION, SOLUTION INTRAVENOUS
Refills: 0 | Status: DISCONTINUED | OUTPATIENT
Start: 2024-05-22 | End: 2024-05-22

## 2024-05-22 RX ORDER — METOPROLOL TARTRATE 50 MG
1 TABLET ORAL
Refills: 0 | DISCHARGE

## 2024-05-22 RX ORDER — MIRTAZAPINE 45 MG/1
1 TABLET, ORALLY DISINTEGRATING ORAL
Qty: 0 | Refills: 0 | DISCHARGE

## 2024-05-22 RX ORDER — ALPRAZOLAM 0.25 MG
1 TABLET ORAL
Refills: 0 | DISCHARGE

## 2024-05-22 NOTE — ASU PATIENT PROFILE, ADULT - PAIN, CHRONIC: FACTORS THAT AGGRAVATE, PROFILE
activity/movement I personally performed the service described in the documentation recorded by the scribe in my presence, and it accurately and completely records my words and actions.

## 2024-05-22 NOTE — ASU PATIENT PROFILE, ADULT - NSICDXPASTMEDICALHX_GEN_ALL_CORE_FT
PAST MEDICAL HISTORY:  Anxiety and depression     Arthritis     At risk for obstructive sleep apnea DHRUV precautions. Pt >3 criteria on STOP-Bang questionnaire.    CAD (coronary artery disease)     Diverticulosis     DM type 2 (diabetes mellitus, type 2)     Endometrial thickening on ultrasound     Gastroparesis     GERD (gastroesophageal reflux disease)     Heart murmur     Hiatal hernia     History of diverticulitis     History of hepatitis C     History of myocardial infarction     HLD (hyperlipidemia)     HTN (hypertension)     Insomnia     Kidney disease     Lower back pain     Marijuana use     Morbid obesity     Renal calculi     Stage 3 chronic kidney disease     Torn meniscus     Umbilical hernia

## 2024-05-22 NOTE — BRIEF OPERATIVE NOTE - NSICDXBRIEFPROCEDURE_GEN_ALL_CORE_FT
PROCEDURES:  Diagnostic hysteroscopy with dilation and curettage of uterus 22-May-2024 17:42:19  Noble Harrington  Polypectomy, endometrium, hysteroscopic 22-May-2024 17:42:52  Noble Harrington

## 2024-05-22 NOTE — BRIEF OPERATIVE NOTE - OPERATION/FINDINGS
Diagnostic Hysteroscopy revealed scarring of the endometrial tissue and an endometrial polyp. Some of the proximal scar tissue had to be cut with the Myosure Light to see the polyp and remove it with the myosure device. Fractional D&C revealed scant tissue.

## 2024-05-22 NOTE — ASU DISCHARGE PLAN (ADULT/PEDIATRIC) - CARE PROVIDER_API CALL
Noble Harrington  Obstetrics and Gynecology  [address] [place] [address]  Phone: [phone]  Fax: [phone]  Follow Up Time:

## 2024-05-28 LAB — SURGICAL PATHOLOGY STUDY: SIGNIFICANT CHANGE UP

## 2024-07-30 PROBLEM — R93.89 ABNORMAL FINDINGS ON DIAGNOSTIC IMAGING OF OTHER SPECIFIED BODY STRUCTURES: Chronic | Status: ACTIVE | Noted: 2024-05-16

## 2024-07-30 PROBLEM — I25.10 ATHEROSCLEROTIC HEART DISEASE OF NATIVE CORONARY ARTERY WITHOUT ANGINA PECTORIS: Chronic | Status: ACTIVE | Noted: 2024-05-16

## 2024-07-30 PROBLEM — N18.30 CHRONIC KIDNEY DISEASE, STAGE 3 UNSPECIFIED: Chronic | Status: ACTIVE | Noted: 2024-05-16

## 2024-07-30 PROBLEM — G47.00 INSOMNIA, UNSPECIFIED: Chronic | Status: ACTIVE | Noted: 2024-05-16

## 2024-07-30 PROBLEM — Z87.19 PERSONAL HISTORY OF OTHER DISEASES OF THE DIGESTIVE SYSTEM: Chronic | Status: ACTIVE | Noted: 2024-05-16

## 2024-07-30 PROBLEM — K42.9 UMBILICAL HERNIA WITHOUT OBSTRUCTION OR GANGRENE: Chronic | Status: ACTIVE | Noted: 2024-05-16

## 2024-07-30 PROBLEM — K44.9 DIAPHRAGMATIC HERNIA WITHOUT OBSTRUCTION OR GANGRENE: Chronic | Status: ACTIVE | Noted: 2024-05-16

## 2024-07-30 PROBLEM — F12.90 CANNABIS USE, UNSPECIFIED, UNCOMPLICATED: Chronic | Status: ACTIVE | Noted: 2024-05-16

## 2024-07-30 PROBLEM — K21.9 GASTRO-ESOPHAGEAL REFLUX DISEASE WITHOUT ESOPHAGITIS: Chronic | Status: ACTIVE | Noted: 2024-05-16

## 2024-07-30 PROBLEM — R01.1 CARDIAC MURMUR, UNSPECIFIED: Chronic | Status: ACTIVE | Noted: 2024-05-16

## 2024-08-05 NOTE — ED PROVIDER NOTE - PMH
Reason for Call:  Appointment Request    Patient requesting this type of appt: Pre-op    Requested provider: Jovana Beaulieu    Reason patient unable to be scheduled: Not within requested timeframe    When does patient want to be seen/preferred time:  3 days    Comments: Pt has preop scheduled for 08/13/24. Surgeons office called him and said they want the preop done this week. Please contact pt with available appt options    Could we send this information to you in [employer] or would you prefer to receive a phone call?:   Patient would prefer a phone call   Okay to leave a detailed message?: Yes at Cell number on file:    Telephone Information:   Mobile [phone]       Call taken on 8/5/2024 at 8:18 AM by Susana Witt     Renal calculi

## 2024-08-06 ENCOUNTER — APPOINTMENT (OUTPATIENT)
Dept: ORTHOPEDIC SURGERY | Facility: CLINIC | Age: 69
End: 2024-08-06

## 2024-08-06 PROCEDURE — 20611 DRAIN/INJ JOINT/BURSA W/US: CPT | Mod: LT

## 2024-08-06 PROCEDURE — 73564 X-RAY EXAM KNEE 4 OR MORE: CPT | Mod: LT

## 2024-08-06 NOTE — HISTORY OF PRESENT ILLNESS
[7] : 7 [0] : 0 [Dull/Aching] : dull/aching [Sharp] : sharp [Intermittent] : intermittent [Injection therapy] : injection therapy [Walking] : walking [] : no

## 2024-08-06 NOTE — ASSESSMENT
[FreeTextEntry1] : The patient is one year out from her left knee arthroscopy and partial lateral meniscectomy on 7/28/23. She had moderate arthritic changes. She has good range of motion but still somewhat swollen and painful. She has pain with walking and stairs. The patient has had good success with injections in the past and wishes to try another CSI today. She has stopped PT at this time. She denies new trauma and reports that her pain occurs secondary to ADLs and overuse.  Left knee exam: Examination left knee reveals well-healed scars with no signs of infection or DVT. Range of motion 0 to 120 degrees with pain at the extremes and there is a 1+ effusion. There is no instability or apprehension. There is diffuse joint line tenderness particular over the lateral side.  Left knee xrays taken today in office, 4 views WB - Moderate tricompartmental OA. No fractures or loose bodies. No obvious tumors, masses, or lesions  The patient received a left knee CSI. She tolerated it well. Ultrasound guidance was used due to body habitus and anatomical change.  She will return as needed.

## 2024-08-06 NOTE — PROCEDURE
[Large Joint Injection] : Large joint injection [Left] : of the left [Knee] : knee [Pain] : pain [Inflammation] : inflammation [Repeat series performed] : repeat series performed [Alcohol] : alcohol [Betadine] : betadine [Ethyl Chloride sprayed topically] : ethyl chloride sprayed topically [Sterile technique used] : sterile technique used [___ cc    1%] : Lidocaine ~Vcc of 1%  [___ cc    0.25%] : Bupivacaine (Marcaine) ~Vcc of 0.25%  [___ cc    40mg] : Methylprednisolone (Depomedrol) ~Vcc of 40 mg  [] : Patient tolerated procedure well [Call if redness, pain or fever occur] : call if redness, pain or fever occur [Previous OTC use and PT nontherapeutic] : patient has tried OTC's including aspirin, Ibuprofen, Aleve, etc or prescription NSAIDS, and/or exercises at home and/or physical therapy without satisfactory response [Patient had decreased mobility in the joint] : patient had decreased mobility in the joint [Risks, benefits, alternatives discussed / Verbal consent obtained] : the risks benefits, and alternatives have been discussed, and verbal consent was obtained [Altered anatomic landmarks d/t erosive arthritis] : altered anatomic landmarks d/t erosive arthritis [All ultrasound images have been permanently captured and stored accordingly in our picture archiving and communication system] : All ultrasound images have been permanently captured and stored accordingly in our picture archiving and communication system [Visualization of the needle and placement of injection was performed without complication] : visualization of the needle and placement of injection was performed without complication

## 2025-02-06 ENCOUNTER — RESULT REVIEW (OUTPATIENT)
Age: 70
End: 2025-02-06

## 2025-02-06 ENCOUNTER — OUTPATIENT (OUTPATIENT)
Dept: OUTPATIENT SERVICES | Facility: HOSPITAL | Age: 70
LOS: 1 days | End: 2025-02-06
Payer: MEDICARE

## 2025-02-06 DIAGNOSIS — Z98.890 OTHER SPECIFIED POSTPROCEDURAL STATES: Chronic | ICD-10-CM

## 2025-02-06 DIAGNOSIS — Z98.1 ARTHRODESIS STATUS: Chronic | ICD-10-CM

## 2025-02-06 DIAGNOSIS — I25.10 ATHEROSCLEROTIC HEART DISEASE OF NATIVE CORONARY ARTERY WITHOUT ANGINA PECTORIS: ICD-10-CM

## 2025-02-06 DIAGNOSIS — Z98.891 HISTORY OF UTERINE SCAR FROM PREVIOUS SURGERY: Chronic | ICD-10-CM

## 2025-02-06 PROCEDURE — A9500: CPT

## 2025-02-06 PROCEDURE — 78452 HT MUSCLE IMAGE SPECT MULT: CPT

## 2025-02-06 PROCEDURE — 93016 CV STRESS TEST SUPVJ ONLY: CPT

## 2025-02-06 PROCEDURE — 93017 CV STRESS TEST TRACING ONLY: CPT

## 2025-02-06 PROCEDURE — 93018 CV STRESS TEST I&R ONLY: CPT

## 2025-02-06 PROCEDURE — 78452 HT MUSCLE IMAGE SPECT MULT: CPT | Mod: 26

## 2025-02-07 ENCOUNTER — OUTPATIENT (OUTPATIENT)
Dept: OUTPATIENT SERVICES | Facility: HOSPITAL | Age: 70
LOS: 1 days | End: 2025-02-07

## 2025-02-07 DIAGNOSIS — I25.10 ATHEROSCLEROTIC HEART DISEASE OF NATIVE CORONARY ARTERY WITHOUT ANGINA PECTORIS: ICD-10-CM

## 2025-02-07 DIAGNOSIS — Z98.890 OTHER SPECIFIED POSTPROCEDURAL STATES: Chronic | ICD-10-CM

## 2025-02-07 DIAGNOSIS — Z98.1 ARTHRODESIS STATUS: Chronic | ICD-10-CM

## 2025-02-07 DIAGNOSIS — Z98.891 HISTORY OF UTERINE SCAR FROM PREVIOUS SURGERY: Chronic | ICD-10-CM

## 2025-02-19 ENCOUNTER — TRANSCRIPTION ENCOUNTER (OUTPATIENT)
Age: 70
End: 2025-02-19

## 2025-02-19 ENCOUNTER — OUTPATIENT (OUTPATIENT)
Dept: OUTPATIENT SERVICES | Facility: HOSPITAL | Age: 70
LOS: 1 days | End: 2025-02-19
Payer: MEDICARE

## 2025-02-19 VITALS
HEART RATE: 64 BPM | RESPIRATION RATE: 17 BRPM | OXYGEN SATURATION: 98 % | SYSTOLIC BLOOD PRESSURE: 125 MMHG | DIASTOLIC BLOOD PRESSURE: 69 MMHG

## 2025-02-19 VITALS
WEIGHT: 220.02 LBS | HEART RATE: 78 BPM | DIASTOLIC BLOOD PRESSURE: 74 MMHG | SYSTOLIC BLOOD PRESSURE: 164 MMHG | TEMPERATURE: 98 F | RESPIRATION RATE: 16 BRPM | HEIGHT: 63 IN | OXYGEN SATURATION: 99 %

## 2025-02-19 DIAGNOSIS — Z98.890 OTHER SPECIFIED POSTPROCEDURAL STATES: Chronic | ICD-10-CM

## 2025-02-19 DIAGNOSIS — R94.39 ABNORMAL RESULT OF OTHER CARDIOVASCULAR FUNCTION STUDY: ICD-10-CM

## 2025-02-19 DIAGNOSIS — Z98.1 ARTHRODESIS STATUS: Chronic | ICD-10-CM

## 2025-02-19 DIAGNOSIS — Z98.891 HISTORY OF UTERINE SCAR FROM PREVIOUS SURGERY: Chronic | ICD-10-CM

## 2025-02-19 LAB
ANION GAP SERPL CALC-SCNC: 17 MMOL/L — SIGNIFICANT CHANGE UP (ref 5–17)
BUN SERPL-MCNC: 27 MG/DL — HIGH (ref 7–23)
CALCIUM SERPL-MCNC: 10.4 MG/DL — SIGNIFICANT CHANGE UP (ref 8.4–10.5)
CHLORIDE SERPL-SCNC: 102 MMOL/L — SIGNIFICANT CHANGE UP (ref 96–108)
CO2 SERPL-SCNC: 19 MMOL/L — LOW (ref 22–31)
CREAT SERPL-MCNC: 0.94 MG/DL — SIGNIFICANT CHANGE UP (ref 0.5–1.3)
EGFR: 65 ML/MIN/1.73M2 — SIGNIFICANT CHANGE UP
GLUCOSE BLDC GLUCOMTR-MCNC: 262 MG/DL — HIGH (ref 70–99)
GLUCOSE SERPL-MCNC: 278 MG/DL — HIGH (ref 70–99)
HCT VFR BLD CALC: 42.6 % — SIGNIFICANT CHANGE UP (ref 34.5–45)
HGB BLD-MCNC: 13.9 G/DL — SIGNIFICANT CHANGE UP (ref 11.5–15.5)
MCHC RBC-ENTMCNC: 28.1 PG — SIGNIFICANT CHANGE UP (ref 27–34)
MCHC RBC-ENTMCNC: 32.6 G/DL — SIGNIFICANT CHANGE UP (ref 32–36)
MCV RBC AUTO: 86.1 FL — SIGNIFICANT CHANGE UP (ref 80–100)
NRBC BLD AUTO-RTO: 0 /100 WBCS — SIGNIFICANT CHANGE UP (ref 0–0)
PLATELET # BLD AUTO: 246 K/UL — SIGNIFICANT CHANGE UP (ref 150–400)
POTASSIUM SERPL-MCNC: 4.5 MMOL/L — SIGNIFICANT CHANGE UP (ref 3.5–5.3)
POTASSIUM SERPL-SCNC: 4.5 MMOL/L — SIGNIFICANT CHANGE UP (ref 3.5–5.3)
RBC # BLD: 4.95 M/UL — SIGNIFICANT CHANGE UP (ref 3.8–5.2)
RBC # FLD: 14.2 % — SIGNIFICANT CHANGE UP (ref 10.3–14.5)
SODIUM SERPL-SCNC: 138 MMOL/L — SIGNIFICANT CHANGE UP (ref 135–145)
WBC # BLD: 12.33 K/UL — HIGH (ref 3.8–10.5)
WBC # FLD AUTO: 12.33 K/UL — HIGH (ref 3.8–10.5)

## 2025-02-19 PROCEDURE — 80048 BASIC METABOLIC PNL TOTAL CA: CPT

## 2025-02-19 PROCEDURE — C1894: CPT

## 2025-02-19 PROCEDURE — 85027 COMPLETE CBC AUTOMATED: CPT

## 2025-02-19 PROCEDURE — 82962 GLUCOSE BLOOD TEST: CPT

## 2025-02-19 PROCEDURE — C1769: CPT

## 2025-02-19 PROCEDURE — 93010 ELECTROCARDIOGRAM REPORT: CPT

## 2025-02-19 PROCEDURE — C1887: CPT

## 2025-02-19 PROCEDURE — 93005 ELECTROCARDIOGRAM TRACING: CPT

## 2025-02-19 PROCEDURE — 93458 L HRT ARTERY/VENTRICLE ANGIO: CPT

## 2025-02-19 RX ORDER — GLUCAGON 3 MG/1
1 POWDER NASAL ONCE
Refills: 0 | Status: DISCONTINUED | OUTPATIENT
Start: 2025-02-19 | End: 2025-03-05

## 2025-02-19 RX ORDER — INSULIN LISPRO 100 U/ML
INJECTION, SOLUTION INTRAVENOUS; SUBCUTANEOUS AT BEDTIME
Refills: 0 | Status: DISCONTINUED | OUTPATIENT
Start: 2025-02-19 | End: 2025-03-05

## 2025-02-19 RX ORDER — DEXTROSE 50 % IN WATER 50 %
15 SYRINGE (ML) INTRAVENOUS ONCE
Refills: 0 | Status: DISCONTINUED | OUTPATIENT
Start: 2025-02-19 | End: 2025-03-05

## 2025-02-19 RX ORDER — DEXTROSE 50 % IN WATER 50 %
25 SYRINGE (ML) INTRAVENOUS ONCE
Refills: 0 | Status: DISCONTINUED | OUTPATIENT
Start: 2025-02-19 | End: 2025-03-05

## 2025-02-19 RX ORDER — SODIUM CHLORIDE 9 G/1000ML
1000 INJECTION, SOLUTION INTRAVENOUS
Refills: 0 | Status: DISCONTINUED | OUTPATIENT
Start: 2025-02-19 | End: 2025-03-05

## 2025-02-19 RX ORDER — DEXTROSE 50 % IN WATER 50 %
12.5 SYRINGE (ML) INTRAVENOUS ONCE
Refills: 0 | Status: DISCONTINUED | OUTPATIENT
Start: 2025-02-19 | End: 2025-03-05

## 2025-02-19 RX ORDER — INSULIN LISPRO 100 U/ML
INJECTION, SOLUTION INTRAVENOUS; SUBCUTANEOUS
Refills: 0 | Status: DISCONTINUED | OUTPATIENT
Start: 2025-02-19 | End: 2025-03-05

## 2025-02-19 RX ORDER — OMEPRAZOLE 20 MG/1
1 CAPSULE, DELAYED RELEASE ORAL
Refills: 0 | DISCHARGE

## 2025-02-19 RX ADMIN — Medication 500 MILLILITER(S): at 08:05

## 2025-02-19 RX ADMIN — Medication 75 MILLILITER(S): at 08:05

## 2025-02-19 NOTE — ASU DISCHARGE PLAN (ADULT/PEDIATRIC) - FINANCIAL ASSISTANCE
[facility] provides services at a reduced cost to those who are determined to be eligible through [facility]’s financial assistance program. Information regarding [facility]’s financial assistance program can be found by going to [email or web address] or by calling [phone].

## 2025-02-19 NOTE — ASU DISCHARGE PLAN (ADULT/PEDIATRIC) - CARE PROVIDER_API CALL
Gagan Adkins  Cardiovascular Disease  [address] [place] [address]  Phone: [phone]  Fax: [phone]  Established Patient  Follow Up Time: 1 month

## 2025-02-19 NOTE — H&P CARDIOLOGY - HISTORY OF PRESENT ILLNESS
70 yo F with PMhx MI at age 40 with h/o angioplasty, HTN, HLD, DM, Hep C, obesity , DHRUV refuses management, CKD,major depression, h/o back pain which is severe so spine surgeon recommended a stimulator . She then needed preop cardiac clearance so followed with Dr Cooper and had an ECHO and stress test. The nuclear stress test was abnormal and she now presents for [facility] .  No fever or chills, no N/V/D or abd pain.

## 2025-03-17 ENCOUNTER — OUTPATIENT (OUTPATIENT)
Dept: OUTPATIENT SERVICES | Facility: HOSPITAL | Age: 70
LOS: 1 days | End: 2025-03-17
Payer: MEDICARE

## 2025-03-17 VITALS
SYSTOLIC BLOOD PRESSURE: 153 MMHG | DIASTOLIC BLOOD PRESSURE: 62 MMHG | OXYGEN SATURATION: 100 % | HEART RATE: 73 BPM | HEIGHT: 63 IN | RESPIRATION RATE: 16 BRPM | TEMPERATURE: 99 F | WEIGHT: 220.02 LBS

## 2025-03-17 DIAGNOSIS — Z01.818 ENCOUNTER FOR OTHER PREPROCEDURAL EXAMINATION: ICD-10-CM

## 2025-03-17 DIAGNOSIS — E11.9 TYPE 2 DIABETES MELLITUS WITHOUT COMPLICATIONS: ICD-10-CM

## 2025-03-17 DIAGNOSIS — Z98.890 OTHER SPECIFIED POSTPROCEDURAL STATES: Chronic | ICD-10-CM

## 2025-03-17 DIAGNOSIS — M54.50 LOW BACK PAIN, UNSPECIFIED: ICD-10-CM

## 2025-03-17 DIAGNOSIS — I10 ESSENTIAL (PRIMARY) HYPERTENSION: ICD-10-CM

## 2025-03-17 DIAGNOSIS — Z98.891 HISTORY OF UTERINE SCAR FROM PREVIOUS SURGERY: Chronic | ICD-10-CM

## 2025-03-17 DIAGNOSIS — Z98.1 ARTHRODESIS STATUS: Chronic | ICD-10-CM

## 2025-03-17 PROBLEM — Z91.89 OTHER SPECIFIED PERSONAL RISK FACTORS, NOT ELSEWHERE CLASSIFIED: Chronic | Status: INACTIVE | Noted: 2022-10-03 | Resolved: 2025-03-17

## 2025-03-17 LAB
A1C WITH ESTIMATED AVERAGE GLUCOSE RESULT: 8.8 % — HIGH (ref 4–5.6)
ALBUMIN SERPL ELPH-MCNC: 3.4 G/DL — SIGNIFICANT CHANGE UP (ref 3.3–5)
ALP SERPL-CCNC: 39 U/L — LOW (ref 40–120)
ALT FLD-CCNC: 23 U/L — SIGNIFICANT CHANGE UP (ref 12–78)
APTT BLD: 29.3 SEC — SIGNIFICANT CHANGE UP (ref 24.5–35.6)
AST SERPL-CCNC: 10 U/L — LOW (ref 15–37)
BASOPHILS NFR BLD AUTO: 1.1 % — SIGNIFICANT CHANGE UP (ref 0–2)
BILIRUB SERPL-MCNC: 0.3 MG/DL — SIGNIFICANT CHANGE UP (ref 0.2–1.2)
BUN SERPL-MCNC: 27 MG/DL — HIGH (ref 7–23)
CALCIUM SERPL-MCNC: 9.2 MG/DL — SIGNIFICANT CHANGE UP (ref 8.5–10.1)
CHLORIDE SERPL-SCNC: 112 MMOL/L — HIGH (ref 96–108)
CO2 SERPL-SCNC: 28 MMOL/L — SIGNIFICANT CHANGE UP (ref 22–31)
EGFR: 44 ML/MIN/1.73M2 — LOW
EOSINOPHIL # BLD AUTO: 0.15 K/UL — SIGNIFICANT CHANGE UP (ref 0–0.5)
EOSINOPHIL NFR BLD AUTO: 2.3 % — SIGNIFICANT CHANGE UP (ref 0–6)
ESTIMATED AVERAGE GLUCOSE: 206 MG/DL — HIGH (ref 68–114)
GLUCOSE SERPL-MCNC: 243 MG/DL — HIGH (ref 70–99)
HCT VFR BLD CALC: 35.6 % — SIGNIFICANT CHANGE UP (ref 34.5–45)
HGB BLD-MCNC: 11.1 G/DL — LOW (ref 11.5–15.5)
IMM GRANULOCYTES # BLD AUTO: 0.08 K/UL — HIGH (ref 0–0.07)
IMM GRANULOCYTES NFR BLD AUTO: 1.2 % — HIGH (ref 0–0.9)
INR BLD: 0.87 RATIO — SIGNIFICANT CHANGE UP (ref 0.85–1.16)
LYMPHOCYTES # BLD AUTO: 1.67 K/UL — SIGNIFICANT CHANGE UP (ref 1–3.3)
LYMPHOCYTES NFR BLD AUTO: 25.3 % — SIGNIFICANT CHANGE UP (ref 13–44)
MCHC RBC-ENTMCNC: 28 PG — SIGNIFICANT CHANGE UP (ref 27–34)
MCHC RBC-ENTMCNC: 31.2 G/DL — LOW (ref 32–36)
MCV RBC AUTO: 89.7 FL — SIGNIFICANT CHANGE UP (ref 80–100)
MONOCYTES NFR BLD AUTO: 8.5 % — SIGNIFICANT CHANGE UP (ref 2–14)
MRSA PCR RESULT.: SIGNIFICANT CHANGE UP
NEUTROPHILS # BLD AUTO: 4.07 K/UL — SIGNIFICANT CHANGE UP (ref 1.8–7.4)
NEUTROPHILS NFR BLD AUTO: 61.6 % — SIGNIFICANT CHANGE UP (ref 43–77)
NRBC # BLD AUTO: 0 K/UL — SIGNIFICANT CHANGE UP (ref 0–0)
NRBC # FLD: 0 K/UL — SIGNIFICANT CHANGE UP (ref 0–0)
PLATELET # BLD AUTO: 240 K/UL — SIGNIFICANT CHANGE UP (ref 150–400)
PMV BLD: 10.2 FL — SIGNIFICANT CHANGE UP (ref 7–13)
POTASSIUM SERPL-MCNC: 4.7 MMOL/L — SIGNIFICANT CHANGE UP (ref 3.5–5.3)
PROT SERPL-MCNC: 6.8 GM/DL — SIGNIFICANT CHANGE UP (ref 6–8.3)
PROTHROM AB SERPL-ACNC: 10 SEC — SIGNIFICANT CHANGE UP (ref 9.9–13.4)
RBC # FLD: 14.8 % — HIGH (ref 10.3–14.5)
S AUREUS DNA NOSE QL NAA+PROBE: SIGNIFICANT CHANGE UP
SODIUM SERPL-SCNC: 140 MMOL/L — SIGNIFICANT CHANGE UP (ref 135–145)
WBC # BLD: 6.6 K/UL — SIGNIFICANT CHANGE UP (ref 3.8–10.5)
WBC # FLD AUTO: 6.6 K/UL — SIGNIFICANT CHANGE UP (ref 3.8–10.5)

## 2025-03-17 PROCEDURE — 83036 HEMOGLOBIN GLYCOSYLATED A1C: CPT

## 2025-03-17 PROCEDURE — 87641 MR-STAPH DNA AMP PROBE: CPT

## 2025-03-17 PROCEDURE — 86900 BLOOD TYPING SEROLOGIC ABO: CPT

## 2025-03-17 PROCEDURE — 86901 BLOOD TYPING SEROLOGIC RH(D): CPT

## 2025-03-17 PROCEDURE — 86850 RBC ANTIBODY SCREEN: CPT

## 2025-03-17 PROCEDURE — 85730 THROMBOPLASTIN TIME PARTIAL: CPT

## 2025-03-17 PROCEDURE — 85610 PROTHROMBIN TIME: CPT

## 2025-03-17 PROCEDURE — 87640 STAPH A DNA AMP PROBE: CPT

## 2025-03-17 PROCEDURE — 86923 COMPATIBILITY TEST ELECTRIC: CPT

## 2025-03-17 PROCEDURE — 80053 COMPREHEN METABOLIC PANEL: CPT

## 2025-03-17 PROCEDURE — 36415 COLL VENOUS BLD VENIPUNCTURE: CPT

## 2025-03-17 PROCEDURE — 99214 OFFICE O/P EST MOD 30 MIN: CPT | Mod: 25

## 2025-03-17 PROCEDURE — 85025 COMPLETE CBC W/AUTO DIFF WBC: CPT

## 2025-03-17 RX ORDER — TRANEXAMIC ACID 1000 MG/10
2000 AMPUL (ML) INTRAVENOUS ONCE
Refills: 0 | Status: DISCONTINUED | OUTPATIENT
Start: 2025-03-18 | End: 2025-03-19

## 2025-03-17 RX ORDER — TRANEXAMIC ACID 1000 MG/10
2000 AMPUL (ML) INTRAVENOUS ONCE
Refills: 0 | Status: COMPLETED | OUTPATIENT
Start: 2025-03-18 | End: 2025-03-18

## 2025-03-17 NOTE — H&P PST ADULT - NSICDXPASTMEDICALHX_GEN_ALL_CORE_FT
PAST MEDICAL HISTORY:  Anxiety and depression     Arthritis     CAD (coronary artery disease)     Diverticulosis     DM type 2 (diabetes mellitus, type 2)     Endometrial thickening on ultrasound     Gastroparesis     GERD (gastroesophageal reflux disease)     H/O peripheral neuropathy     Heart murmur     Hiatal hernia     History of diverticulitis     History of hepatitis C     History of myocardial infarction     HLD (hyperlipidemia)     HTN (hypertension)     Insomnia     Kidney disease     Lower back pain     Marijuana use     Morbid obesity     DHRUV (obstructive sleep apnea)     Renal calculi     Stage 3 chronic kidney disease     Torn meniscus     Umbilical hernia

## 2025-03-17 NOTE — H&P PST ADULT - PROBLEM SELECTOR PLAN 1
Pre op chlorhexidine instructions given and explained.  spine educational booklet provided   Avoid NSAIDs and OTC supplements.   Patient verbalized understanding  medical optimization requested by surgeon  cbc, bmp, type and screen, ptt/inr, a1c, mrsa, albumin,  done today in PST

## 2025-03-17 NOTE — H&P PST ADULT - PROBLEM SELECTOR PLAN 2
A1c pending  Hold Metformin and glimepiride on the DOS   last dose Trulicity  3/10/25 and last dose of Jardiance  3/13/25 as advised by PMD   Patient verbalized understanding

## 2025-03-17 NOTE — H&P PST ADULT - ASSESSMENT
69 y/o female presents to PST for schedule exploration fusion l4-s1 removal hardware on 3/18/25    CAPRINI SCORE Version 2013    AGE RELATED RISK FACTORS                                                             [ ] Age 41-60 years             [1 point]  [ x] Age: 61-74 years            [2 points]                 [ ] Age 75 years or over     [3 points]             DISEASE RELATED RISK FACTORS                                                       [ ] Current swollen legs (pitting edema of any level)     [1 point]                     [ ] Varicose veins (visible, bulging vein, not spider veins or surgically removed veins)   [1 point]                                 [x ] BMI > 25 Kg/m2                       [1 point]  [ ] BMI > 40 kg/m2                       [1 point]                                 [ ] Serious infection (within past month, requiring hospitalization and IV antibiotics)    [1 point]                     [ ] Lung disease ( interstitial: COPD, emphysema, sarcoid, 9-11 illness, NOT asthma)       [1 point]                                                                          [ ] Acute myocardial infarction (within past month)      [1 point]                  [ ] Congestive heart failure (episode within past month or taking CHF meds)                   [1 point]         [ ] Inflammatory bowel disease (Crohns disease or ulcerative colitis, not irritable bowel syndrome)   [1 point]                  [ ] Central venous access, PICC line or Port (within past month)     [2 points]                                                             [ ] Stroke (within past month)     [5 points]    [ ] Previous or present malignancy (includes melanoma but not basal cell carcinoma, each incidence of cancer scores 2 points-not metastases)  [2 points]                                                                                                                                                         HEMATOLOGY RELATED FACTORS                                                         [ ] History of DVT or PE (including superficial venous thrombosis)    [3 points]                    [ x] Positive family history for DVT or PE (includes first-, second-, and third-degree relatives)   [3 points]   [ ] Personal or family history of genetic thrombophilia (family history counts only if it has not been confirmed that patient does not have this genetic marker)                       [ ] Prothrombin [id number] mutation                   [3 points]                           [ ] Factor V Leiden                                               [3 points]            [ ] Antithrombin III deficiency                           [3 points]         [ ] Protein C & S deficiency                                [3 points]              [ ] Dysfibrinogenemia                                         [3 points]  [ ] Personal history of acquired thrombophilia                       [ ] Lupus anticoagulant                                       [3 points]                                                                  [ ] Anticardiolipin antibodies                             [3 points]              [ ] Antiphospholipid antibodies                         [3 points]                                                         [ ] High homocysteine in the blood                  [3 points]                                                    [ ] Myeloproliferative disorders (including thrombocytosis)    [3 points]            [ ] HIV                                                                     [3 points]                                                    [ ] Heparin induced thrombocytopenia            [3 points]                                        MOBILITY RELATED FACTORS  [ ] Bed rest or restricted mobility (inability to ambulate 30 feet continuously or removable leg brace) for less than 72 hours    [1 point]  [ ] Nonremovable plaster cast or mold that prevents calf muscle use   [2 points]  [ ] Bed bound  or restricted mobility for more than 72 hours                  [2 points]    GENDER SPECIFIC FACTORS  [ ] Pregnancy or had a baby within the last month   [1 point]  [ ] Hormone therapy  or oral contraception               [1 point]  [ ] Current use of estrogen-like drugs (raloxifine, tamoxifen, anastrozole, letrozole)                                [1 point]  [ ] History of unexplained stillborn infant, premature birth with toxemia or growth-restricted infant   [1 point]  [ ] Recurrent spontaneous abortions (3 or more)      [1 point]    OTHER RISK FACTORS                                         [x ] Current smoker (includes vaping and smoking marijuana)      [1 point]  [ ] Diabetes requiring insulin     [1 point]                   [ ] Chemotherapy (includes methotrexate for rheumatoid arthritis, hydroxyurea for thrombocytosis)    [1 point]  [ ] Blood transfusion(s)               [1 point]    SURGERY RELATED RISK FACTORS  [ ]  section within the last month                    [1 point]  [ ] Minor surgery is planned (less than 45 minutes)     [1 point]  [ ] Past major surgery (longer than 45 minutes) within past month  [2 points]  [ x] Planned major surgery lasting more than 45 minutes (includes laparoscopic and arthroscopic; do not add to the "5" for hip and knee replacement)    [2 points]  [ x] Length of surgery over 2 hours (includes anesthesia time; do not add to the "5" for hip and knee replacement)   [1 point]  [ ] Elective hip or knee joint replacement surgery         [5 points]                                               TRAUMA RELATED RISK FACTORS  [ ] Fracture of the hip, pelvis, or leg                       [5 points]  [ ] Spinal cord injury resulting in paralysis (within the past month)    [5 points]  [ ] Paralysis  (within the past month)                      [5 points]  [ ] Multiple trauma (within the past month)         [5 Points]    Total Score [  10     ]    Caprini score 7 or higher: High risk, pharmocologic VTE prophylaxis indicated for most patients (in the absence of contraindications)

## 2025-03-17 NOTE — H&P PST ADULT - PROBLEM SELECTOR PLAN 3
Cardiac optimization in chart   On  the DOS  with sip of water take cardiac meds - metoprolol   no enalapril on the DOS   Patient verbalized understanding.

## 2025-03-17 NOTE — H&P PST ADULT - HISTORY OF PRESENT ILLNESS
71 y/o female presents to PST for schedule exploration fusion l4-s1 removal hardware on 3/18/25. Patient report lower back pain that has progressively worsen over last 2 years. She is unable to bear weight due to radiating pain associated with occasional numbness to bilateral LE. Seen by surgeon diagnostic testing done and procedure advised for loose screw.

## 2025-03-17 NOTE — H&P PST ADULT - NSICDXPASTSURGICALHX_GEN_ALL_CORE_FT
PAST SURGICAL HISTORY:  H/O arthroscopy of left knee     History of 2  sections     History of colonoscopy     History of lumbar fusion     History of lumbar laminectomy     History of percutaneous angioplasty     S/P cardiac catheterization

## 2025-03-18 ENCOUNTER — INPATIENT (INPATIENT)
Facility: HOSPITAL | Age: 70
LOS: 6 days | Discharge: HOME | DRG: 552 | End: 2025-03-25
Attending: ORTHOPAEDIC SURGERY | Admitting: ORTHOPAEDIC SURGERY
Payer: MEDICARE

## 2025-03-18 VITALS
TEMPERATURE: 97 F | OXYGEN SATURATION: 97 % | RESPIRATION RATE: 16 BRPM | HEIGHT: 63 IN | WEIGHT: 220.02 LBS | DIASTOLIC BLOOD PRESSURE: 78 MMHG | HEART RATE: 70 BPM | SYSTOLIC BLOOD PRESSURE: 132 MMHG

## 2025-03-18 DIAGNOSIS — Z98.890 OTHER SPECIFIED POSTPROCEDURAL STATES: Chronic | ICD-10-CM

## 2025-03-18 DIAGNOSIS — Z01.818 ENCOUNTER FOR OTHER PREPROCEDURAL EXAMINATION: ICD-10-CM

## 2025-03-18 DIAGNOSIS — M96.1 POSTLAMINECTOMY SYNDROME, NOT ELSEWHERE CLASSIFIED: ICD-10-CM

## 2025-03-18 DIAGNOSIS — Z98.891 HISTORY OF UTERINE SCAR FROM PREVIOUS SURGERY: Chronic | ICD-10-CM

## 2025-03-18 DIAGNOSIS — Z98.1 ARTHRODESIS STATUS: Chronic | ICD-10-CM

## 2025-03-18 LAB
ANION GAP SERPL CALC-SCNC: 4 MMOL/L — LOW (ref 5–17)
BASE EXCESS BLDA CALC-SCNC: -10.7 MMOL/L — LOW (ref -2–3)
BASOPHILS # BLD AUTO: 0.06 K/UL — SIGNIFICANT CHANGE UP (ref 0–0.2)
BASOPHILS NFR BLD AUTO: 0.7 % — SIGNIFICANT CHANGE UP (ref 0–2)
BLOOD GAS COMMENTS ARTERIAL: SIGNIFICANT CHANGE UP
BUN SERPL-MCNC: 27 MG/DL — HIGH (ref 7–23)
CALCIUM SERPL-MCNC: 8.1 MG/DL — LOW (ref 8.5–10.1)
CHLORIDE SERPL-SCNC: 111 MMOL/L — HIGH (ref 96–108)
CO2 SERPL-SCNC: 25 MMOL/L — SIGNIFICANT CHANGE UP (ref 22–31)
CREAT SERPL-MCNC: 1.2 MG/DL — SIGNIFICANT CHANGE UP (ref 0.5–1.3)
EGFR: 48 ML/MIN/1.73M2 — LOW
EGFR: 48 ML/MIN/1.73M2 — LOW
EOSINOPHIL # BLD AUTO: 0.11 K/UL — SIGNIFICANT CHANGE UP (ref 0–0.5)
EOSINOPHIL NFR BLD AUTO: 1.3 % — SIGNIFICANT CHANGE UP (ref 0–6)
GAS PNL BLDA: SIGNIFICANT CHANGE UP
GLUCOSE BLDC GLUCOMTR-MCNC: 167 MG/DL — HIGH (ref 70–99)
GLUCOSE BLDC GLUCOMTR-MCNC: 174 MG/DL — HIGH (ref 70–99)
GLUCOSE BLDC GLUCOMTR-MCNC: 174 MG/DL — HIGH (ref 70–99)
GLUCOSE BLDC GLUCOMTR-MCNC: 184 MG/DL — HIGH (ref 70–99)
GLUCOSE BLDC GLUCOMTR-MCNC: 207 MG/DL — HIGH (ref 70–99)
GLUCOSE BLDC GLUCOMTR-MCNC: 216 MG/DL — HIGH (ref 70–99)
GLUCOSE SERPL-MCNC: 211 MG/DL — HIGH (ref 70–99)
HCO3 BLDA-SCNC: 14 MMOL/L — LOW (ref 21–28)
HCT VFR BLD CALC: 29.4 % — LOW (ref 34.5–45)
HCT VFR BLD CALC: 32.6 % — LOW (ref 34.5–45)
HGB BLD-MCNC: 10.2 G/DL — LOW (ref 11.5–15.5)
HGB BLD-MCNC: 9.4 G/DL — LOW (ref 11.5–15.5)
IMM GRANULOCYTES # BLD AUTO: 0.09 K/UL — HIGH (ref 0–0.07)
IMM GRANULOCYTES NFR BLD AUTO: 1.1 % — HIGH (ref 0–0.9)
LYMPHOCYTES # BLD AUTO: 2.02 K/UL — SIGNIFICANT CHANGE UP (ref 1–3.3)
LYMPHOCYTES NFR BLD AUTO: 24.2 % — SIGNIFICANT CHANGE UP (ref 13–44)
MCHC RBC-ENTMCNC: 28.4 PG — SIGNIFICANT CHANGE UP (ref 27–34)
MCHC RBC-ENTMCNC: 28.5 PG — SIGNIFICANT CHANGE UP (ref 27–34)
MCHC RBC-ENTMCNC: 31.3 G/DL — LOW (ref 32–36)
MCHC RBC-ENTMCNC: 32 G/DL — SIGNIFICANT CHANGE UP (ref 32–36)
MCV RBC AUTO: 89.1 FL — SIGNIFICANT CHANGE UP (ref 80–100)
MCV RBC AUTO: 90.8 FL — SIGNIFICANT CHANGE UP (ref 80–100)
MONOCYTES # BLD AUTO: 0.49 K/UL — SIGNIFICANT CHANGE UP (ref 0–0.9)
MONOCYTES NFR BLD AUTO: 5.9 % — SIGNIFICANT CHANGE UP (ref 2–14)
NEUTROPHILS # BLD AUTO: 5.56 K/UL — SIGNIFICANT CHANGE UP (ref 1.8–7.4)
NEUTROPHILS NFR BLD AUTO: 66.8 % — SIGNIFICANT CHANGE UP (ref 43–77)
NRBC # BLD AUTO: 0 K/UL — SIGNIFICANT CHANGE UP (ref 0–0)
NRBC # BLD AUTO: 0 K/UL — SIGNIFICANT CHANGE UP (ref 0–0)
NRBC # FLD: 0 K/UL — SIGNIFICANT CHANGE UP (ref 0–0)
NRBC # FLD: 0 K/UL — SIGNIFICANT CHANGE UP (ref 0–0)
NRBC BLD AUTO-RTO: 0 /100 WBCS — SIGNIFICANT CHANGE UP (ref 0–0)
NRBC BLD AUTO-RTO: 0 /100 WBCS — SIGNIFICANT CHANGE UP (ref 0–0)
PCO2 BLDA: 29 MMHG — LOW (ref 32–45)
PH BLDA: 7.3 — LOW (ref 7.35–7.45)
PLATELET # BLD AUTO: 199 K/UL — SIGNIFICANT CHANGE UP (ref 150–400)
PLATELET # BLD AUTO: 253 K/UL — SIGNIFICANT CHANGE UP (ref 150–400)
PMV BLD: 10.3 FL — SIGNIFICANT CHANGE UP (ref 7–13)
PMV BLD: 10.3 FL — SIGNIFICANT CHANGE UP (ref 7–13)
PO2 BLDA: 271 MMHG — HIGH (ref 83–108)
POTASSIUM SERPL-MCNC: 4.4 MMOL/L — SIGNIFICANT CHANGE UP (ref 3.5–5.3)
POTASSIUM SERPL-SCNC: 4.4 MMOL/L — SIGNIFICANT CHANGE UP (ref 3.5–5.3)
RBC # BLD: 3.3 M/UL — LOW (ref 3.8–5.2)
RBC # BLD: 3.59 M/UL — LOW (ref 3.8–5.2)
RBC # FLD: 15 % — HIGH (ref 10.3–14.5)
RBC # FLD: 15.2 % — HIGH (ref 10.3–14.5)
SAO2 % BLDA: 99 % — HIGH (ref 94–98)
SODIUM SERPL-SCNC: 140 MMOL/L — SIGNIFICANT CHANGE UP (ref 135–145)
WBC # BLD: 8.33 K/UL — SIGNIFICANT CHANGE UP (ref 3.8–10.5)
WBC # BLD: 8.71 K/UL — SIGNIFICANT CHANGE UP (ref 3.8–10.5)
WBC # FLD AUTO: 8.33 K/UL — SIGNIFICANT CHANGE UP (ref 3.8–10.5)
WBC # FLD AUTO: 8.71 K/UL — SIGNIFICANT CHANGE UP (ref 3.8–10.5)

## 2025-03-18 PROCEDURE — 84100 ASSAY OF PHOSPHORUS: CPT

## 2025-03-18 PROCEDURE — 99232 SBSQ HOSP IP/OBS MODERATE 35: CPT

## 2025-03-18 PROCEDURE — 83735 ASSAY OF MAGNESIUM: CPT

## 2025-03-18 PROCEDURE — 72100 X-RAY EXAM L-S SPINE 2/3 VWS: CPT | Mod: 26

## 2025-03-18 PROCEDURE — 74018 RADEX ABDOMEN 1 VIEW: CPT

## 2025-03-18 PROCEDURE — 36430 TRANSFUSION BLD/BLD COMPNT: CPT

## 2025-03-18 PROCEDURE — 88304 TISSUE EXAM BY PATHOLOGIST: CPT | Mod: 26

## 2025-03-18 PROCEDURE — C1713: CPT

## 2025-03-18 PROCEDURE — 97116 GAIT TRAINING THERAPY: CPT | Mod: GP

## 2025-03-18 PROCEDURE — 80048 BASIC METABOLIC PNL TOTAL CA: CPT

## 2025-03-18 PROCEDURE — 97530 THERAPEUTIC ACTIVITIES: CPT | Mod: GP

## 2025-03-18 PROCEDURE — 97162 PT EVAL MOD COMPLEX 30 MIN: CPT | Mod: GP

## 2025-03-18 PROCEDURE — 85027 COMPLETE CBC AUTOMATED: CPT

## 2025-03-18 PROCEDURE — 86891 AUTOLOGOUS BLOOD OP SALVAGE: CPT

## 2025-03-18 PROCEDURE — 82962 GLUCOSE BLOOD TEST: CPT

## 2025-03-18 PROCEDURE — 36600 WITHDRAWAL OF ARTERIAL BLOOD: CPT

## 2025-03-18 PROCEDURE — C1889: CPT

## 2025-03-18 PROCEDURE — 88304 TISSUE EXAM BY PATHOLOGIST: CPT

## 2025-03-18 PROCEDURE — 36415 COLL VENOUS BLD VENIPUNCTURE: CPT

## 2025-03-18 PROCEDURE — 76000 FLUOROSCOPY <1 HR PHYS/QHP: CPT

## 2025-03-18 PROCEDURE — 82803 BLOOD GASES ANY COMBINATION: CPT

## 2025-03-18 PROCEDURE — 80053 COMPREHEN METABOLIC PANEL: CPT

## 2025-03-18 PROCEDURE — 72100 X-RAY EXAM L-S SPINE 2/3 VWS: CPT

## 2025-03-18 PROCEDURE — 85025 COMPLETE CBC W/AUTO DIFF WBC: CPT

## 2025-03-18 PROCEDURE — P9016: CPT

## 2025-03-18 PROCEDURE — 81001 URINALYSIS AUTO W/SCOPE: CPT

## 2025-03-18 RX ORDER — B1/B2/B3/B5/B6/B12/VIT C/FOLIC 500-0.5 MG
1 TABLET ORAL DAILY
Refills: 0 | Status: DISCONTINUED | OUTPATIENT
Start: 2025-03-18 | End: 2025-03-25

## 2025-03-18 RX ORDER — MIRTAZAPINE 30 MG/1
15 TABLET, FILM COATED ORAL AT BEDTIME
Refills: 0 | Status: DISCONTINUED | OUTPATIENT
Start: 2025-03-18 | End: 2025-03-25

## 2025-03-18 RX ORDER — HYDROMORPHONE/SOD CHLOR,ISO/PF 2 MG/10 ML
1 SYRINGE (ML) INJECTION
Refills: 0 | Status: DISCONTINUED | OUTPATIENT
Start: 2025-03-18 | End: 2025-03-19

## 2025-03-18 RX ORDER — ONDANSETRON HCL/PF 4 MG/2 ML
4 VIAL (ML) INJECTION ONCE
Refills: 0 | Status: COMPLETED | OUTPATIENT
Start: 2025-03-18 | End: 2025-03-18

## 2025-03-18 RX ORDER — DEXTROSE 50 % IN WATER 50 %
15 SYRINGE (ML) INTRAVENOUS ONCE
Refills: 0 | Status: DISCONTINUED | OUTPATIENT
Start: 2025-03-18 | End: 2025-03-25

## 2025-03-18 RX ORDER — CEFAZOLIN SODIUM IN 0.9 % NACL 3 G/100 ML
2000 INTRAVENOUS SOLUTION, PIGGYBACK (ML) INTRAVENOUS EVERY 8 HOURS
Refills: 0 | Status: COMPLETED | OUTPATIENT
Start: 2025-03-18 | End: 2025-03-19

## 2025-03-18 RX ORDER — SENNA 187 MG
2 TABLET ORAL AT BEDTIME
Refills: 0 | Status: DISCONTINUED | OUTPATIENT
Start: 2025-03-18 | End: 2025-03-25

## 2025-03-18 RX ORDER — ALPRAZOLAM 0.5 MG
1 TABLET, EXTENDED RELEASE 24 HR ORAL DAILY
Refills: 0 | Status: DISCONTINUED | OUTPATIENT
Start: 2025-03-18 | End: 2025-03-20

## 2025-03-18 RX ORDER — ATORVASTATIN CALCIUM 80 MG/1
40 TABLET, FILM COATED ORAL AT BEDTIME
Refills: 0 | Status: DISCONTINUED | OUTPATIENT
Start: 2025-03-18 | End: 2025-03-25

## 2025-03-18 RX ORDER — ACETAMINOPHEN 500 MG/5ML
650 LIQUID (ML) ORAL EVERY 6 HOURS
Refills: 0 | Status: DISCONTINUED | OUTPATIENT
Start: 2025-03-18 | End: 2025-03-25

## 2025-03-18 RX ORDER — CELECOXIB 50 MG/1
200 CAPSULE ORAL DAILY
Refills: 0 | Status: DISCONTINUED | OUTPATIENT
Start: 2025-03-19 | End: 2025-03-25

## 2025-03-18 RX ORDER — CEFAZOLIN SODIUM IN 0.9 % NACL 3 G/100 ML
2000 INTRAVENOUS SOLUTION, PIGGYBACK (ML) INTRAVENOUS EVERY 8 HOURS
Refills: 0 | Status: DISCONTINUED | OUTPATIENT
Start: 2025-03-18 | End: 2025-03-18

## 2025-03-18 RX ORDER — DEXTROSE 50 % IN WATER 50 %
25 SYRINGE (ML) INTRAVENOUS ONCE
Refills: 0 | Status: DISCONTINUED | OUTPATIENT
Start: 2025-03-18 | End: 2025-03-25

## 2025-03-18 RX ORDER — HYDROMORPHONE/SOD CHLOR,ISO/PF 2 MG/10 ML
0.5 SYRINGE (ML) INJECTION
Refills: 0 | Status: DISCONTINUED | OUTPATIENT
Start: 2025-03-18 | End: 2025-03-20

## 2025-03-18 RX ORDER — CELECOXIB 50 MG/1
400 CAPSULE ORAL ONCE
Refills: 0 | Status: COMPLETED | OUTPATIENT
Start: 2025-03-18 | End: 2025-03-18

## 2025-03-18 RX ORDER — ONDANSETRON HCL/PF 4 MG/2 ML
4 VIAL (ML) INJECTION ONCE
Refills: 0 | Status: DISCONTINUED | OUTPATIENT
Start: 2025-03-18 | End: 2025-03-18

## 2025-03-18 RX ORDER — FENTANYL CITRATE-0.9 % NACL/PF 100MCG/2ML
50 SYRINGE (ML) INTRAVENOUS
Refills: 0 | Status: DISCONTINUED | OUTPATIENT
Start: 2025-03-18 | End: 2025-03-18

## 2025-03-18 RX ORDER — SODIUM CHLORIDE 9 G/1000ML
1000 INJECTION, SOLUTION INTRAVENOUS
Refills: 0 | Status: DISCONTINUED | OUTPATIENT
Start: 2025-03-18 | End: 2025-03-18

## 2025-03-18 RX ORDER — SERTRALINE 100 MG/1
250 TABLET, FILM COATED ORAL AT BEDTIME
Refills: 0 | Status: DISCONTINUED | OUTPATIENT
Start: 2025-03-18 | End: 2025-03-25

## 2025-03-18 RX ORDER — SODIUM CHLORIDE 9 G/1000ML
1000 INJECTION, SOLUTION INTRAVENOUS
Refills: 0 | Status: DISCONTINUED | OUTPATIENT
Start: 2025-03-18 | End: 2025-03-25

## 2025-03-18 RX ORDER — METFORMIN HYDROCHLORIDE 850 MG/1
500 TABLET ORAL
Refills: 0 | Status: DISCONTINUED | OUTPATIENT
Start: 2025-03-18 | End: 2025-03-18

## 2025-03-18 RX ORDER — NALOXONE HYDROCHLORIDE 0.4 MG/ML
0.1 INJECTION, SOLUTION INTRAMUSCULAR; INTRAVENOUS; SUBCUTANEOUS
Refills: 0 | Status: DISCONTINUED | OUTPATIENT
Start: 2025-03-18 | End: 2025-03-25

## 2025-03-18 RX ORDER — TRANEXAMIC ACID 1000 MG/10
2000 AMPUL (ML) INTRAVENOUS ONCE
Refills: 0 | Status: DISCONTINUED | OUTPATIENT
Start: 2025-03-18 | End: 2025-03-19

## 2025-03-18 RX ORDER — FENOFIBRATE 160 MG/1
145 TABLET ORAL DAILY
Refills: 0 | Status: DISCONTINUED | OUTPATIENT
Start: 2025-03-18 | End: 2025-03-25

## 2025-03-18 RX ORDER — ENALAPRIL MALEATE 20 MG
5 TABLET ORAL AT BEDTIME
Refills: 0 | Status: DISCONTINUED | OUTPATIENT
Start: 2025-03-18 | End: 2025-03-24

## 2025-03-18 RX ORDER — OXYCODONE HYDROCHLORIDE 30 MG/1
5 TABLET ORAL
Refills: 0 | Status: DISCONTINUED | OUTPATIENT
Start: 2025-03-18 | End: 2025-03-19

## 2025-03-18 RX ORDER — GLUCAGON 3 MG/1
1 POWDER NASAL ONCE
Refills: 0 | Status: DISCONTINUED | OUTPATIENT
Start: 2025-03-18 | End: 2025-03-25

## 2025-03-18 RX ORDER — METOPROLOL SUCCINATE 50 MG/1
100 TABLET, EXTENDED RELEASE ORAL DAILY
Refills: 0 | Status: DISCONTINUED | OUTPATIENT
Start: 2025-03-18 | End: 2025-03-25

## 2025-03-18 RX ORDER — ACETAMINOPHEN 500 MG/5ML
1000 LIQUID (ML) ORAL ONCE
Refills: 0 | Status: COMPLETED | OUTPATIENT
Start: 2025-03-18 | End: 2025-03-18

## 2025-03-18 RX ORDER — INSULIN LISPRO 100 U/ML
INJECTION, SOLUTION INTRAVENOUS; SUBCUTANEOUS
Refills: 0 | Status: DISCONTINUED | OUTPATIENT
Start: 2025-03-18 | End: 2025-03-25

## 2025-03-18 RX ORDER — BISACODYL 5 MG
5 TABLET, DELAYED RELEASE (ENTERIC COATED) ORAL EVERY 12 HOURS
Refills: 0 | Status: DISCONTINUED | OUTPATIENT
Start: 2025-03-18 | End: 2025-03-25

## 2025-03-18 RX ORDER — ACETAMINOPHEN 500 MG/5ML
1000 LIQUID (ML) ORAL ONCE
Refills: 0 | Status: DISCONTINUED | OUTPATIENT
Start: 2025-03-18 | End: 2025-03-25

## 2025-03-18 RX ORDER — HYDROMORPHONE/SOD CHLOR,ISO/PF 2 MG/10 ML
30 SYRINGE (ML) INJECTION
Refills: 0 | Status: DISCONTINUED | OUTPATIENT
Start: 2025-03-18 | End: 2025-03-20

## 2025-03-18 RX ORDER — DEXTROSE 50 % IN WATER 50 %
12.5 SYRINGE (ML) INTRAVENOUS ONCE
Refills: 0 | Status: DISCONTINUED | OUTPATIENT
Start: 2025-03-18 | End: 2025-03-25

## 2025-03-18 RX ADMIN — Medication 2 TABLET(S): at 22:17

## 2025-03-18 RX ADMIN — Medication 0.5 MILLIGRAM(S): at 14:50

## 2025-03-18 RX ADMIN — SODIUM CHLORIDE 125 MILLILITER(S): 9 INJECTION, SOLUTION INTRAVENOUS at 14:41

## 2025-03-18 RX ADMIN — SERTRALINE 250 MILLIGRAM(S): 100 TABLET, FILM COATED ORAL at 22:14

## 2025-03-18 RX ADMIN — Medication 1000 MILLIGRAM(S): at 22:49

## 2025-03-18 RX ADMIN — Medication 2000 MILLIGRAM(S): at 22:13

## 2025-03-18 RX ADMIN — Medication 400 MILLIGRAM(S): at 22:19

## 2025-03-18 RX ADMIN — Medication 30 MILLILITER(S): at 14:40

## 2025-03-18 RX ADMIN — MIRTAZAPINE 15 MILLIGRAM(S): 30 TABLET, FILM COATED ORAL at 22:14

## 2025-03-18 RX ADMIN — INSULIN LISPRO 2: 100 INJECTION, SOLUTION INTRAVENOUS; SUBCUTANEOUS at 22:29

## 2025-03-18 RX ADMIN — Medication 4 MILLIGRAM(S): at 22:14

## 2025-03-18 NOTE — BRIEF OPERATIVE NOTE - NSICDXBRIEFPOSTOP_GEN_ALL_CORE_FT
POST-OP DIAGNOSIS:  Spinal stenosis of lumbar region with neurogenic claudication 18-Mar-2025 14:16:46  Francis Yang  Morbid obesity 18-Mar-2025 14:16:58  Francis Yang

## 2025-03-18 NOTE — BRIEF OPERATIVE NOTE - NSICDXBRIEFPREOP_GEN_ALL_CORE_FT
PRE-OP DIAGNOSIS:  Spinal stenosis of lumbar region with neurogenic claudication 18-Mar-2025 14:16:31  Francis Yang

## 2025-03-18 NOTE — PATIENT PROFILE ADULT - FALL HARM RISK - HARM RISK INTERVENTIONS

## 2025-03-19 LAB
ANION GAP SERPL CALC-SCNC: 2 MMOL/L — LOW (ref 5–17)
BASOPHILS # BLD AUTO: 0.05 K/UL — SIGNIFICANT CHANGE UP (ref 0–0.2)
BASOPHILS NFR BLD AUTO: 0.4 % — SIGNIFICANT CHANGE UP (ref 0–2)
BUN SERPL-MCNC: 29 MG/DL — HIGH (ref 7–23)
CALCIUM SERPL-MCNC: 8.3 MG/DL — LOW (ref 8.5–10.1)
CHLORIDE SERPL-SCNC: 110 MMOL/L — HIGH (ref 96–108)
CO2 SERPL-SCNC: 26 MMOL/L — SIGNIFICANT CHANGE UP (ref 22–31)
CREAT SERPL-MCNC: 1.4 MG/DL — HIGH (ref 0.5–1.3)
EGFR: 40 ML/MIN/1.73M2 — LOW
EGFR: 40 ML/MIN/1.73M2 — LOW
EOSINOPHIL # BLD AUTO: 0.06 K/UL — SIGNIFICANT CHANGE UP (ref 0–0.5)
EOSINOPHIL NFR BLD AUTO: 0.5 % — SIGNIFICANT CHANGE UP (ref 0–6)
GLUCOSE BLDC GLUCOMTR-MCNC: 214 MG/DL — HIGH (ref 70–99)
GLUCOSE BLDC GLUCOMTR-MCNC: 216 MG/DL — HIGH (ref 70–99)
GLUCOSE BLDC GLUCOMTR-MCNC: 221 MG/DL — HIGH (ref 70–99)
GLUCOSE BLDC GLUCOMTR-MCNC: 227 MG/DL — HIGH (ref 70–99)
GLUCOSE BLDC GLUCOMTR-MCNC: 279 MG/DL — HIGH (ref 70–99)
GLUCOSE SERPL-MCNC: 229 MG/DL — HIGH (ref 70–99)
HCT VFR BLD CALC: 33 % — LOW (ref 34.5–45)
HGB BLD-MCNC: 10 G/DL — LOW (ref 11.5–15.5)
IMM GRANULOCYTES # BLD AUTO: 0.09 K/UL — HIGH (ref 0–0.07)
IMM GRANULOCYTES NFR BLD AUTO: 0.8 % — SIGNIFICANT CHANGE UP (ref 0–0.9)
LYMPHOCYTES # BLD AUTO: 1.1 K/UL — SIGNIFICANT CHANGE UP (ref 1–3.3)
LYMPHOCYTES NFR BLD AUTO: 9.6 % — LOW (ref 13–44)
MCHC RBC-ENTMCNC: 28 PG — SIGNIFICANT CHANGE UP (ref 27–34)
MCHC RBC-ENTMCNC: 30.3 G/DL — LOW (ref 32–36)
MCV RBC AUTO: 92.4 FL — SIGNIFICANT CHANGE UP (ref 80–100)
MONOCYTES # BLD AUTO: 0.79 K/UL — SIGNIFICANT CHANGE UP (ref 0–0.9)
MONOCYTES NFR BLD AUTO: 6.9 % — SIGNIFICANT CHANGE UP (ref 2–14)
NEUTROPHILS # BLD AUTO: 9.4 K/UL — HIGH (ref 1.8–7.4)
NEUTROPHILS NFR BLD AUTO: 81.8 % — HIGH (ref 43–77)
NRBC # BLD AUTO: 0 K/UL — SIGNIFICANT CHANGE UP (ref 0–0)
NRBC # FLD: 0 K/UL — SIGNIFICANT CHANGE UP (ref 0–0)
NRBC BLD AUTO-RTO: 0 /100 WBCS — SIGNIFICANT CHANGE UP (ref 0–0)
PLATELET # BLD AUTO: 211 K/UL — SIGNIFICANT CHANGE UP (ref 150–400)
PMV BLD: 10.7 FL — SIGNIFICANT CHANGE UP (ref 7–13)
POTASSIUM SERPL-MCNC: 5.2 MMOL/L — SIGNIFICANT CHANGE UP (ref 3.5–5.3)
POTASSIUM SERPL-SCNC: 5.2 MMOL/L — SIGNIFICANT CHANGE UP (ref 3.5–5.3)
RBC # BLD: 3.57 M/UL — LOW (ref 3.8–5.2)
RBC # FLD: 15.5 % — HIGH (ref 10.3–14.5)
SODIUM SERPL-SCNC: 138 MMOL/L — SIGNIFICANT CHANGE UP (ref 135–145)
WBC # BLD: 11.49 K/UL — HIGH (ref 3.8–10.5)
WBC # FLD AUTO: 11.49 K/UL — HIGH (ref 3.8–10.5)

## 2025-03-19 PROCEDURE — 99232 SBSQ HOSP IP/OBS MODERATE 35: CPT

## 2025-03-19 RX ORDER — INSULIN GLARGINE-YFGN 100 [IU]/ML
10 INJECTION, SOLUTION SUBCUTANEOUS EVERY MORNING
Refills: 0 | Status: DISCONTINUED | OUTPATIENT
Start: 2025-03-19 | End: 2025-03-20

## 2025-03-19 RX ORDER — SODIUM CHLORIDE 9 G/1000ML
1000 INJECTION, SOLUTION INTRAVENOUS
Refills: 0 | Status: DISCONTINUED | OUTPATIENT
Start: 2025-03-19 | End: 2025-03-20

## 2025-03-19 RX ORDER — SODIUM CHLORIDE 9 G/1000ML
500 INJECTION, SOLUTION INTRAVENOUS ONCE
Refills: 0 | Status: COMPLETED | OUTPATIENT
Start: 2025-03-19 | End: 2025-03-19

## 2025-03-19 RX ADMIN — Medication 650 MILLIGRAM(S): at 05:31

## 2025-03-19 RX ADMIN — Medication 650 MILLIGRAM(S): at 11:30

## 2025-03-19 RX ADMIN — INSULIN LISPRO 2: 100 INJECTION, SOLUTION INTRAVENOUS; SUBCUTANEOUS at 16:38

## 2025-03-19 RX ADMIN — SODIUM CHLORIDE 75 MILLILITER(S): 9 INJECTION, SOLUTION INTRAVENOUS at 08:53

## 2025-03-19 RX ADMIN — CELECOXIB 200 MILLIGRAM(S): 50 CAPSULE ORAL at 14:01

## 2025-03-19 RX ADMIN — MIRTAZAPINE 15 MILLIGRAM(S): 30 TABLET, FILM COATED ORAL at 23:32

## 2025-03-19 RX ADMIN — Medication 650 MILLIGRAM(S): at 12:13

## 2025-03-19 RX ADMIN — Medication 650 MILLIGRAM(S): at 18:02

## 2025-03-19 RX ADMIN — Medication 650 MILLIGRAM(S): at 16:40

## 2025-03-19 RX ADMIN — SODIUM CHLORIDE 500 MILLILITER(S): 9 INJECTION, SOLUTION INTRAVENOUS at 08:53

## 2025-03-19 RX ADMIN — CELECOXIB 200 MILLIGRAM(S): 50 CAPSULE ORAL at 13:14

## 2025-03-19 RX ADMIN — INSULIN LISPRO 2: 100 INJECTION, SOLUTION INTRAVENOUS; SUBCUTANEOUS at 11:29

## 2025-03-19 RX ADMIN — INSULIN LISPRO 2: 100 INJECTION, SOLUTION INTRAVENOUS; SUBCUTANEOUS at 23:46

## 2025-03-19 RX ADMIN — Medication 2000 MILLIGRAM(S): at 13:14

## 2025-03-19 RX ADMIN — INSULIN LISPRO 2: 100 INJECTION, SOLUTION INTRAVENOUS; SUBCUTANEOUS at 08:10

## 2025-03-19 RX ADMIN — SODIUM CHLORIDE 75 MILLILITER(S): 9 INJECTION, SOLUTION INTRAVENOUS at 16:37

## 2025-03-19 RX ADMIN — Medication 2 TABLET(S): at 23:32

## 2025-03-19 RX ADMIN — METOPROLOL SUCCINATE 100 MILLIGRAM(S): 50 TABLET, EXTENDED RELEASE ORAL at 08:08

## 2025-03-19 RX ADMIN — FENOFIBRATE 145 MILLIGRAM(S): 160 TABLET ORAL at 08:06

## 2025-03-19 RX ADMIN — INSULIN GLARGINE-YFGN 10 UNIT(S): 100 INJECTION, SOLUTION SUBCUTANEOUS at 08:56

## 2025-03-19 RX ADMIN — Medication 1 MILLIGRAM(S): at 08:09

## 2025-03-19 RX ADMIN — ATORVASTATIN CALCIUM 40 MILLIGRAM(S): 80 TABLET, FILM COATED ORAL at 23:31

## 2025-03-19 RX ADMIN — Medication 40 MILLIGRAM(S): at 13:14

## 2025-03-19 RX ADMIN — SODIUM CHLORIDE 75 MILLILITER(S): 9 INJECTION, SOLUTION INTRAVENOUS at 23:34

## 2025-03-19 RX ADMIN — Medication 2000 MILLIGRAM(S): at 05:31

## 2025-03-19 NOTE — PHYSICAL THERAPY INITIAL EVALUATION ADULT - GENERAL OBSERVATIONS, REHAB EVAL
pt rec'd supine in bed on SDU, PCA, IV, louie, JAMSHID x 2, LSO in closet, SCDs. pt denies pain at rest/if still. pt little confused- endorses thinking today is surgery day

## 2025-03-19 NOTE — PROGRESS NOTE ADULT - ASSESSMENT
Problem List:  1) back pain, POD#1 L3-L4 lami and fusion  2) EMIL  3) dehydration  4) HTN  5) HLD  6) anxiety/depression     Problem List:  1) back pain, POD#1 L3-L4 lami and fusion  2) EMIL  3) dehydration  4) HTN  5) HLD  6) anxiety/depression    home dose anti depressants and xanax ordered. pain control with NSAIDs, tylenol, PCA pump   Bp stable on her home dose anti hypertensives   Stable on nasal canula O2  advance diet, add protonix (GERD history, post op)  EMIL, suspect prerenal for blood loss in OR and dehydration given 500LR bolus and will start LR at 75ml/hr for 24 hours, check labs and lytes in AM  claire op abx with cefazolin   SCDS for DVT-P, spinal drain in place  glucose not optimal, will add lantus 10U in the morning. Maintain sliding scale insulin   remove a line   Possible transfer to medical floors tomorrow, to remain SICU today   Plan discussed with ICU attending Dr. No

## 2025-03-19 NOTE — PROGRESS NOTE ADULT - ASSESSMENT
69 yo WF presents with progressive back and R thigh pain. patient underwent lumbar lami/fusion L4-S1 about 1-1/2 yrs ago. Patient was doing well immediately post op but notes progressive c/o back and R thigh pain. Studies reveal adjacent level DDD with spinal stenosis and migration of the R L4 pedical screw thru the superior endplate of L4. Patient failed nonoperative modalities and underwent expl fusion, removal hardware, lami L3/4, laminotomy L2/3, fusion L3-S1, TLIF L3/4, cage, instrumentation, LBG, BMP on 3/18/25.    POD #1  Maintain PCA  D/C A line  Start OOB/PT  Monitor JPs  Monitor labs  Monitor U/O  Medical management

## 2025-03-19 NOTE — PROGRESS NOTE ADULT - SUBJECTIVE AND OBJECTIVE BOX
69 yo WF presents with progressive back and R thigh pain. patient underwent lumbar lami/fusion L4-S1 about 1-1/2 yrs ago. Patient was doing well immediately post op but notes progressive c/o back and R thigh pain. Studies reveal adjacent level DDD with spinal stenosis and migration of the R L4 pedical screw thru the superior endplate of L4. Patient failed nonoperative modalities and underwent expl fusion, removal hardware, lami L3/4, laminotomy L2/3, fusion L3-S1, TLIF L3/4, cage, instrumentation, LBG, BMP on 3/18/25.    3/19/25 Patient with incisional pain but no thigh pain    PAST MEDICAL & SURGICAL HISTORY:  Renal calculi      DM type 2 (diabetes mellitus, type 2)      HTN (hypertension)      Lower back pain      HLD (hyperlipidemia)      Anxiety and depression      Arthritis      Torn meniscus      Gastroparesis      Diverticulosis      History of myocardial infarction      Kidney disease      History of hepatitis C      Morbid obesity      Hiatal hernia      GERD (gastroesophageal reflux disease)      Umbilical hernia      CAD (coronary artery disease)      Heart murmur      Insomnia      History of diverticulitis      Stage 3 chronic kidney disease      Marijuana use      Endometrial thickening on ultrasound      DHRUV (obstructive sleep apnea)      H/O peripheral neuropathy      History of lumbar laminectomy      History of lumbar fusion      History of 2  sections      History of colonoscopy      History of percutaneous angioplasty      H/O arthroscopy of left knee      S/P cardiac catheterization    MEDICATIONS  (STANDING):  acetaminophen     Tablet .. 650 milliGRAM(s) Oral every 6 hours  acetaminophen   IVPB .. 1000 milliGRAM(s) IV Intermittent once  atorvastatin 40 milliGRAM(s) Oral at bedtime  ceFAZolin  Injectable. 2000 milliGRAM(s) IV Push every 8 hours  dextrose 5%. 1000 milliLiter(s) (50 mL/Hr) IV Continuous <Continuous>  dextrose 5%. 1000 milliLiter(s) (100 mL/Hr) IV Continuous <Continuous>  dextrose 50% Injectable 25 Gram(s) IV Push once  dextrose 50% Injectable 12.5 Gram(s) IV Push once  dextrose 50% Injectable 25 Gram(s) IV Push once  enalapril 5 milliGRAM(s) Oral at bedtime  fenofibrate Tablet 145 milliGRAM(s) Oral daily  glucagon  Injectable 1 milliGRAM(s) IntraMuscular once  HYDROmorphone PCA (1 mG/mL) 30 milliLiter(s) PCA Continuous PCA Continuous  insulin glargine Injectable (LANTUS) 10 Unit(s) SubCutaneous every morning  insulin lispro (ADMELOG) corrective regimen sliding scale   SubCutaneous Before meals and at bedtime  lactated ringers. 1000 milliLiter(s) (75 mL/Hr) IV Continuous <Continuous>  metoprolol succinate  milliGRAM(s) Oral daily  mirtazapine 15 milliGRAM(s) Oral at bedtime  multivitamin 1 Tablet(s) Oral daily  pantoprazole    Tablet 40 milliGRAM(s) Oral daily  senna 2 Tablet(s) Oral at bedtime  sertraline 250 milliGRAM(s) Oral at bedtime    MEDICATIONS  (PRN):  ALPRAZolam 1 milliGRAM(s) Oral daily PRN for anxiety  bisacodyl 5 milliGRAM(s) Oral every 12 hours PRN Constipation  dextrose Oral Gel 15 Gram(s) Oral once PRN Blood Glucose LESS THAN 70 milliGRAM(s)/deciliter  HYDROmorphone PCA (1 mG/mL) Rescue Clinician Bolus 0.5 milliGRAM(s) IV Push every 15 minutes PRN for Pain Scale GREATER THAN 6  naloxone Injectable 0.1 milliGRAM(s) IV Push every 3 minutes PRN For ANY of the following changes in patient status:  A. RR LESS THAN 10 breaths per minute, B. Oxygen saturation LESS THAN 90%, C. Sedation score of 6    Allergies    strawberry (Other (Moderate))  No Known Drug Allergies  copper (Hives)    Intolerances    morphine (Other (Mild))  codeine (Other (Mild))    PE:    ICU Vital Signs Last 24 Hrs  T(C): 37.1 (19 Mar 2025 08:35), Max: 37.1 (19 Mar 2025 08:35)  T(F): 98.7 (19 Mar 2025 08:35), Max: 98.7 (19 Mar 2025 08:35)  HR: 71 (19 Mar 2025 07:00) (61 - 78)  BP: 136/69 (19 Mar 2025 06:00) (115/59 - 158/81)  BP(mean): 85 (19 Mar 2025 06:00) (79 - 96)  ABP: 135/59 (19 Mar 2025 07:00) (87/81 - 165/77)  ABP(mean): 85 (19 Mar 2025 07:00) (85 - 313)  RR: 16 (19 Mar 2025 07:00) (8 - 18)  SpO2: 94% (19 Mar 2025 07:00) (90% - 100%)    O2 Parameters below as of 19 Mar 2025 07:00  Patient On (Oxygen Delivery Method): nasal cannula  O2 Flow (L/min): 2      Patient sitting upright in bed with c/o incisional pain  Tolerating diet  Vogt with good U/O  Dressing D&I  Deep JAMSHID with 290 cc postop/110 last 12 hrs  Superficial JAMSHID without drainage  Neuro without deficits    LABS                        10.0   11.49 )-----------( 211      ( 19 Mar 2025 05:30 )             33.0                03-19    138  |  110[H]  |  29[H]  ----------------------------<  229[H]  5.2   |  26  |  1.40[H]    Ca    8.3[L]      19 Mar 2025 05:30

## 2025-03-19 NOTE — PHYSICAL THERAPY INITIAL EVALUATION ADULT - PERTINENT HX OF CURRENT PROBLEM, REHAB EVAL
presents with progressive back and R thigh pain. patient underwent lumbar lami/fusion L4-S1 about 1-1/2 yrs ago. Patient was doing well immediately post op but notes progressive c/o back and R thigh pain. Studies reveal adjacent level DDD with spinal stenosis and migration of the R L4 pedical screw thru the superior endplate of L4. Patient failed nonoperative modalities and underwent expl fusion, removal hardware, lami L3/4, laminotomy L2/3, fusion L3-S1, TLIF L3/4, cage, instrumentation, LBG, BMP on 3/18/25.

## 2025-03-19 NOTE — PHYSICAL THERAPY INITIAL EVALUATION ADULT - ACTIVE RANGE OF MOTION EXAMINATION, REHAB EVAL
B hip/knee flex somewhat ltd in bed/bilateral upper extremity Active ROM was WFL (within functional limits)/bilateral  lower extremity Active ROM was WFL (within functional limits)

## 2025-03-19 NOTE — PROGRESS NOTE ADULT - SUBJECTIVE AND OBJECTIVE BOX
Patient is a 70y old  Female who presents with a chief complaint of elective back surgery     BRIEF HOSPITAL COURSE: ***    Events last 24 hours: POD#1 L3-L4 lami and fusion. She does have some pain in her low back. Able to move and feel her LE. Pain is better with PCA pump. Tolerated oral liquid diet this AM.     PAST MEDICAL & SURGICAL HISTORY:  Renal calculi      DM type 2 (diabetes mellitus, type 2)      HTN (hypertension)      Lower back pain      HLD (hyperlipidemia)      Anxiety and depression      Arthritis      Torn meniscus      Gastroparesis      Diverticulosis      History of myocardial infarction      Kidney disease      History of hepatitis C      Morbid obesity      Hiatal hernia      GERD (gastroesophageal reflux disease)      Umbilical hernia      CAD (coronary artery disease)      Heart murmur      Insomnia      History of diverticulitis      Stage 3 chronic kidney disease      Marijuana use      Endometrial thickening on ultrasound      DHRUV (obstructive sleep apnea)      H/O peripheral neuropathy      History of lumbar laminectomy      History of lumbar fusion      History of 2  sections      History of colonoscopy      History of percutaneous angioplasty      H/O arthroscopy of left knee      S/P cardiac catheterization          Review of Systems:  CONSTITUTIONAL: No fever, chills, or fatigue  EYES: No eye pain, visual disturbances, or discharge  ENMT:  No difficulty hearing, tinnitus, vertigo; No sinus or throat pain  NECK: No pain or stiffness  RESPIRATORY: No cough, wheezing, chills or hemoptysis; No shortness of breath  CARDIOVASCULAR: No chest pain, palpitations, dizziness, or leg swelling  GASTROINTESTINAL: No abdominal or epigastric pain. No nausea, vomiting, or hematemesis; No diarrhea or constipation. No melena or hematochezia.  GENITOURINARY: No dysuria, frequency, hematuria, or incontinence  NEUROLOGICAL: No headaches, memory loss, loss of strength, numbness, or tremors  SKIN: No itching, burning, rashes, or lesions   MUSCULOSKELETAL: No joint pain or swelling; + back pain   PSYCHIATRIC: No depression, anxiety, mood swings, or difficulty sleeping      Medications:  ceFAZolin  Injectable. 2000 milliGRAM(s) IV Push every 8 hours    enalapril 5 milliGRAM(s) Oral at bedtime  metoprolol succinate  milliGRAM(s) Oral daily      acetaminophen     Tablet .. 650 milliGRAM(s) Oral every 6 hours  acetaminophen   IVPB .. 1000 milliGRAM(s) IV Intermittent once  ALPRAZolam 1 milliGRAM(s) Oral daily PRN  HYDROmorphone PCA (1 mG/mL) 30 milliLiter(s) PCA Continuous PCA Continuous  HYDROmorphone PCA (1 mG/mL) Rescue Clinician Bolus 0.5 milliGRAM(s) IV Push every 15 minutes PRN  mirtazapine 15 milliGRAM(s) Oral at bedtime  sertraline 250 milliGRAM(s) Oral at bedtime        bisacodyl 5 milliGRAM(s) Oral every 12 hours PRN  senna 2 Tablet(s) Oral at bedtime      atorvastatin 40 milliGRAM(s) Oral at bedtime  dextrose 50% Injectable 25 Gram(s) IV Push once  dextrose 50% Injectable 12.5 Gram(s) IV Push once  dextrose 50% Injectable 25 Gram(s) IV Push once  dextrose Oral Gel 15 Gram(s) Oral once PRN  fenofibrate Tablet 145 milliGRAM(s) Oral daily  glucagon  Injectable 1 milliGRAM(s) IntraMuscular once  insulin glargine Injectable (LANTUS) 10 Unit(s) SubCutaneous every morning  insulin lispro (ADMELOG) corrective regimen sliding scale   SubCutaneous Before meals and at bedtime    dextrose 5%. 1000 milliLiter(s) IV Continuous <Continuous>  dextrose 5%. 1000 milliLiter(s) IV Continuous <Continuous>  lactated ringers. 1000 milliLiter(s) IV Continuous <Continuous>  multivitamin 1 Tablet(s) Oral daily        naloxone Injectable 0.1 milliGRAM(s) IV Push every 3 minutes PRN          ICU Vital Signs Last 24 Hrs  T(C): 37.1 (19 Mar 2025 08:35), Max: 37.1 (19 Mar 2025 08:35)  T(F): 98.7 (19 Mar 2025 08:35), Max: 98.7 (19 Mar 2025 08:35)  HR: 71 (19 Mar 2025 07:00) (61 - 78)  BP: 136/69 (19 Mar 2025 06:00) (115/59 - 158/81)  BP(mean): 85 (19 Mar 2025 06:00) (79 - 96)  ABP: 135/59 (19 Mar 2025 07:00) (87/81 - 165/77)  ABP(mean): 85 (19 Mar 2025 07:00) (85 - 313)  RR: 16 (19 Mar 2025 07:00) (8 - 18)  SpO2: 94% (19 Mar 2025 07:00) (90% - 100%)    O2 Parameters below as of 19 Mar 2025 07:00  Patient On (Oxygen Delivery Method): nasal cannula  O2 Flow (L/min): 2          ABG - ( 18 Mar 2025 12:42 )  pH, Arterial: 7.30  pH, Blood: x     /  pCO2: 29    /  pO2: 271   / HCO3: 14    / Base Excess: -10.7 /  SaO2: 99.0                I&O's Detail    18 Mar 2025 07:01  -  19 Mar 2025 07:00  --------------------------------------------------------  IN:    Other (mL): 3950 mL    sodium chloride 0.9%: 315 mL  Total IN: 4265 mL    OUT:    Blood Loss (mL): 600 mL    Bulb (mL): 290 mL    Bulb (mL): 0 mL    Indwelling Catheter - Urethral (mL): 600 mL    Other (mL): 480 mL  Total OUT: 1970 mL    Total NET: 2295 mL            LABS:                        10.0   11.49 )-----------( 211      ( 19 Mar 2025 05:30 )             33.0     03-19    138  |  110[H]  |  29[H]  ----------------------------<  229[H]  5.2   |  26  |  1.40[H]    Ca    8.3[L]      19 Mar 2025 05:30            CAPILLARY BLOOD GLUCOSE      POCT Blood Glucose.: 279 mg/dL (19 Mar 2025 08:55)      Urinalysis Basic - ( 19 Mar 2025 05:30 )    Color: x / Appearance: x / SG: x / pH: x  Gluc: 229 mg/dL / Ketone: x  / Bili: x / Urobili: x   Blood: x / Protein: x / Nitrite: x   Leuk Esterase: x / RBC: x / WBC x   Sq Epi: x / Non Sq Epi: x / Bacteria: x      CULTURES:      Physical Examination:    General:  Alert, oriented, interactive, nonfocal    HEENT: Pupils equal, reactive to light.  Symmetric.    PULM: Clear to auscultation bilaterally    CVS: Regular rate and rhythm    ABD: Soft, nondistended, nontender,    EXT: No edema, nontender    SKIN: Warm     NEURO: A&Ox3, moving LE, appropriate sensation      RADIOLOGY: reviewed       " Time spent on this patient encounter, which includes documenting this note in the electronic medical record, was 41 minutes including assessing the presenting problems with associated risks, reviewing the medical record to prepare for the encounter, and meeting face to face with patient to obtain additional history. I have also performed an appropriate physical exam, made interventions listed and ordered and interpreted appropriate diagnostic studies as documented. To improve communication and patient saftey, I have coordinated care with the multidisciplinary team including the bedside nurse, appropriate attending of record and consultants as needed. "    Date of Entry of this note is equal to the date of services rendered Patient is a 70y old  Female who presents with a chief complaint of elective back surgery     BRIEF HOSPITAL COURSE: 69 yo female with PMHx DM2, HTN, HLD, anxiety, depression, CAD, chronic lower back pain, DHRUV, hx lumbar fusion presents for scheduled exploration fusion l4-s1 removal hardware on 3/18/25.     3/18: s/p laminectomy L3-L4 with osteotomy and fusion 3/18, EBL 600cc, received 250 cell saver    Events last 24 hours: POD#1 L3-L4 lami and fusion. She does have some pain in her low back. Able to move and feel her LE. Pain is better with PCA pump. Tolerated oral liquid diet this AM.     PAST MEDICAL & SURGICAL HISTORY:  Renal calculi      DM type 2 (diabetes mellitus, type 2)      HTN (hypertension)      Lower back pain      HLD (hyperlipidemia)      Anxiety and depression      Arthritis      Torn meniscus      Gastroparesis      Diverticulosis      History of myocardial infarction      Kidney disease      History of hepatitis C      Morbid obesity      Hiatal hernia      GERD (gastroesophageal reflux disease)      Umbilical hernia      CAD (coronary artery disease)      Heart murmur      Insomnia      History of diverticulitis      Stage 3 chronic kidney disease      Marijuana use      Endometrial thickening on ultrasound      DHRUV (obstructive sleep apnea)      H/O peripheral neuropathy      History of lumbar laminectomy      History of lumbar fusion      History of 2  sections      History of colonoscopy      History of percutaneous angioplasty      H/O arthroscopy of left knee      S/P cardiac catheterization          Review of Systems:  CONSTITUTIONAL: No fever, chills, or fatigue  EYES: No eye pain, visual disturbances, or discharge  ENMT:  No difficulty hearing, tinnitus, vertigo; No sinus or throat pain  NECK: No pain or stiffness  RESPIRATORY: No cough, wheezing, chills or hemoptysis; No shortness of breath  CARDIOVASCULAR: No chest pain, palpitations, dizziness, or leg swelling  GASTROINTESTINAL: No abdominal or epigastric pain. No nausea, vomiting, or hematemesis; No diarrhea or constipation. No melena or hematochezia.  GENITOURINARY: No dysuria, frequency, hematuria, or incontinence  NEUROLOGICAL: No headaches, memory loss, loss of strength, numbness, or tremors  SKIN: No itching, burning, rashes, or lesions   MUSCULOSKELETAL: No joint pain or swelling; + back pain   PSYCHIATRIC: No depression, anxiety, mood swings, or difficulty sleeping      Medications:  ceFAZolin  Injectable. 2000 milliGRAM(s) IV Push every 8 hours    enalapril 5 milliGRAM(s) Oral at bedtime  metoprolol succinate  milliGRAM(s) Oral daily      acetaminophen     Tablet .. 650 milliGRAM(s) Oral every 6 hours  acetaminophen   IVPB .. 1000 milliGRAM(s) IV Intermittent once  ALPRAZolam 1 milliGRAM(s) Oral daily PRN  HYDROmorphone PCA (1 mG/mL) 30 milliLiter(s) PCA Continuous PCA Continuous  HYDROmorphone PCA (1 mG/mL) Rescue Clinician Bolus 0.5 milliGRAM(s) IV Push every 15 minutes PRN  mirtazapine 15 milliGRAM(s) Oral at bedtime  sertraline 250 milliGRAM(s) Oral at bedtime        bisacodyl 5 milliGRAM(s) Oral every 12 hours PRN  senna 2 Tablet(s) Oral at bedtime      atorvastatin 40 milliGRAM(s) Oral at bedtime  dextrose 50% Injectable 25 Gram(s) IV Push once  dextrose 50% Injectable 12.5 Gram(s) IV Push once  dextrose 50% Injectable 25 Gram(s) IV Push once  dextrose Oral Gel 15 Gram(s) Oral once PRN  fenofibrate Tablet 145 milliGRAM(s) Oral daily  glucagon  Injectable 1 milliGRAM(s) IntraMuscular once  insulin glargine Injectable (LANTUS) 10 Unit(s) SubCutaneous every morning  insulin lispro (ADMELOG) corrective regimen sliding scale   SubCutaneous Before meals and at bedtime    dextrose 5%. 1000 milliLiter(s) IV Continuous <Continuous>  dextrose 5%. 1000 milliLiter(s) IV Continuous <Continuous>  lactated ringers. 1000 milliLiter(s) IV Continuous <Continuous>  multivitamin 1 Tablet(s) Oral daily        naloxone Injectable 0.1 milliGRAM(s) IV Push every 3 minutes PRN          ICU Vital Signs Last 24 Hrs  T(C): 37.1 (19 Mar 2025 08:35), Max: 37.1 (19 Mar 2025 08:35)  T(F): 98.7 (19 Mar 2025 08:35), Max: 98.7 (19 Mar 2025 08:35)  HR: 71 (19 Mar 2025 07:00) (61 - 78)  BP: 136/69 (19 Mar 2025 06:00) (115/59 - 158/81)  BP(mean): 85 (19 Mar 2025 06:00) (79 - 96)  ABP: 135/59 (19 Mar 2025 07:00) (87/81 - 165/77)  ABP(mean): 85 (19 Mar 2025 07:00) (85 - 313)  RR: 16 (19 Mar 2025 07:00) (8 - 18)  SpO2: 94% (19 Mar 2025 07:00) (90% - 100%)    O2 Parameters below as of 19 Mar 2025 07:00  Patient On (Oxygen Delivery Method): nasal cannula  O2 Flow (L/min): 2          ABG - ( 18 Mar 2025 12:42 )  pH, Arterial: 7.30  pH, Blood: x     /  pCO2: 29    /  pO2: 271   / HCO3: 14    / Base Excess: -10.7 /  SaO2: 99.0                I&O's Detail    18 Mar 2025 07:01  -  19 Mar 2025 07:00  --------------------------------------------------------  IN:    Other (mL): 3950 mL    sodium chloride 0.9%: 315 mL  Total IN: 4265 mL    OUT:    Blood Loss (mL): 600 mL    Bulb (mL): 290 mL    Bulb (mL): 0 mL    Indwelling Catheter - Urethral (mL): 600 mL    Other (mL): 480 mL  Total OUT: 1970 mL    Total NET: 2295 mL            LABS:                        10.0   11.49 )-----------( 211      ( 19 Mar 2025 05:30 )             33.0     03-19    138  |  110[H]  |  29[H]  ----------------------------<  229[H]  5.2   |  26  |  1.40[H]    Ca    8.3[L]      19 Mar 2025 05:30            CAPILLARY BLOOD GLUCOSE      POCT Blood Glucose.: 279 mg/dL (19 Mar 2025 08:55)      Urinalysis Basic - ( 19 Mar 2025 05:30 )    Color: x / Appearance: x / SG: x / pH: x  Gluc: 229 mg/dL / Ketone: x  / Bili: x / Urobili: x   Blood: x / Protein: x / Nitrite: x   Leuk Esterase: x / RBC: x / WBC x   Sq Epi: x / Non Sq Epi: x / Bacteria: x      CULTURES:      Physical Examination:    General:  Alert, oriented, interactive, nonfocal    HEENT: Pupils equal, reactive to light.  Symmetric.    PULM: Clear to auscultation bilaterally    CVS: Regular rate and rhythm    ABD: Soft, nondistended, nontender,    EXT: No edema, nontender    SKIN: Warm     NEURO: A&Ox3, moving LE, appropriate sensation      RADIOLOGY: reviewed       " Time spent on this patient encounter, which includes documenting this note in the electronic medical record, was 41 minutes including assessing the presenting problems with associated risks, reviewing the medical record to prepare for the encounter, and meeting face to face with patient to obtain additional history. I have also performed an appropriate physical exam, made interventions listed and ordered and interpreted appropriate diagnostic studies as documented. To improve communication and patient saftey, I have coordinated care with the multidisciplinary team including the bedside nurse, appropriate attending of record and consultants as needed. "    Date of Entry of this note is equal to the date of services rendered

## 2025-03-20 LAB
ANION GAP SERPL CALC-SCNC: 3 MMOL/L — LOW (ref 5–17)
BASOPHILS # BLD AUTO: 0.04 K/UL — SIGNIFICANT CHANGE UP (ref 0–0.2)
BASOPHILS NFR BLD AUTO: 0.4 % — SIGNIFICANT CHANGE UP (ref 0–2)
BUN SERPL-MCNC: 31 MG/DL — HIGH (ref 7–23)
CALCIUM SERPL-MCNC: 8.4 MG/DL — LOW (ref 8.5–10.1)
CHLORIDE SERPL-SCNC: 110 MMOL/L — HIGH (ref 96–108)
CO2 SERPL-SCNC: 24 MMOL/L — SIGNIFICANT CHANGE UP (ref 22–31)
CREAT SERPL-MCNC: 1.21 MG/DL — SIGNIFICANT CHANGE UP (ref 0.5–1.3)
EGFR: 48 ML/MIN/1.73M2 — LOW
EGFR: 48 ML/MIN/1.73M2 — LOW
EOSINOPHIL # BLD AUTO: 0.03 K/UL — SIGNIFICANT CHANGE UP (ref 0–0.5)
EOSINOPHIL NFR BLD AUTO: 0.3 % — SIGNIFICANT CHANGE UP (ref 0–6)
GLUCOSE BLDC GLUCOMTR-MCNC: 227 MG/DL — HIGH (ref 70–99)
GLUCOSE BLDC GLUCOMTR-MCNC: 270 MG/DL — HIGH (ref 70–99)
GLUCOSE BLDC GLUCOMTR-MCNC: 280 MG/DL — HIGH (ref 70–99)
GLUCOSE BLDC GLUCOMTR-MCNC: 288 MG/DL — HIGH (ref 70–99)
GLUCOSE SERPL-MCNC: 272 MG/DL — HIGH (ref 70–99)
HCT VFR BLD CALC: 27.3 % — LOW (ref 34.5–45)
HCT VFR BLD CALC: 28.1 % — LOW (ref 34.5–45)
HGB BLD-MCNC: 8.4 G/DL — LOW (ref 11.5–15.5)
HGB BLD-MCNC: 8.6 G/DL — LOW (ref 11.5–15.5)
IMM GRANULOCYTES # BLD AUTO: 0.12 K/UL — HIGH (ref 0–0.07)
IMM GRANULOCYTES NFR BLD AUTO: 1.2 % — HIGH (ref 0–0.9)
LYMPHOCYTES # BLD AUTO: 0.79 K/UL — LOW (ref 1–3.3)
LYMPHOCYTES NFR BLD AUTO: 8.2 % — LOW (ref 13–44)
MCHC RBC-ENTMCNC: 28.2 PG — SIGNIFICANT CHANGE UP (ref 27–34)
MCHC RBC-ENTMCNC: 28.3 PG — SIGNIFICANT CHANGE UP (ref 27–34)
MCHC RBC-ENTMCNC: 30.6 G/DL — LOW (ref 32–36)
MCHC RBC-ENTMCNC: 30.8 G/DL — LOW (ref 32–36)
MCV RBC AUTO: 91.9 FL — SIGNIFICANT CHANGE UP (ref 80–100)
MCV RBC AUTO: 92.1 FL — SIGNIFICANT CHANGE UP (ref 80–100)
MONOCYTES # BLD AUTO: 0.72 K/UL — SIGNIFICANT CHANGE UP (ref 0–0.9)
MONOCYTES NFR BLD AUTO: 7.4 % — SIGNIFICANT CHANGE UP (ref 2–14)
NEUTROPHILS # BLD AUTO: 7.97 K/UL — HIGH (ref 1.8–7.4)
NEUTROPHILS NFR BLD AUTO: 82.5 % — HIGH (ref 43–77)
NRBC # BLD AUTO: 0 K/UL — SIGNIFICANT CHANGE UP (ref 0–0)
NRBC # BLD AUTO: 0 K/UL — SIGNIFICANT CHANGE UP (ref 0–0)
NRBC # FLD: 0 K/UL — SIGNIFICANT CHANGE UP (ref 0–0)
NRBC # FLD: 0 K/UL — SIGNIFICANT CHANGE UP (ref 0–0)
NRBC BLD AUTO-RTO: 0 /100 WBCS — SIGNIFICANT CHANGE UP (ref 0–0)
NRBC BLD AUTO-RTO: 0 /100 WBCS — SIGNIFICANT CHANGE UP (ref 0–0)
PLATELET # BLD AUTO: 143 K/UL — LOW (ref 150–400)
PLATELET # BLD AUTO: 149 K/UL — LOW (ref 150–400)
PMV BLD: 10.8 FL — SIGNIFICANT CHANGE UP (ref 7–13)
PMV BLD: 11.3 FL — SIGNIFICANT CHANGE UP (ref 7–13)
POTASSIUM SERPL-MCNC: 4.9 MMOL/L — SIGNIFICANT CHANGE UP (ref 3.5–5.3)
POTASSIUM SERPL-SCNC: 4.9 MMOL/L — SIGNIFICANT CHANGE UP (ref 3.5–5.3)
RBC # BLD: 2.97 M/UL — LOW (ref 3.8–5.2)
RBC # BLD: 3.05 M/UL — LOW (ref 3.8–5.2)
RBC # FLD: 15.7 % — HIGH (ref 10.3–14.5)
RBC # FLD: 15.8 % — HIGH (ref 10.3–14.5)
SODIUM SERPL-SCNC: 137 MMOL/L — SIGNIFICANT CHANGE UP (ref 135–145)
WBC # BLD: 8.04 K/UL — SIGNIFICANT CHANGE UP (ref 3.8–10.5)
WBC # BLD: 9.67 K/UL — SIGNIFICANT CHANGE UP (ref 3.8–10.5)
WBC # FLD AUTO: 8.04 K/UL — SIGNIFICANT CHANGE UP (ref 3.8–10.5)
WBC # FLD AUTO: 9.67 K/UL — SIGNIFICANT CHANGE UP (ref 3.8–10.5)

## 2025-03-20 PROCEDURE — 99232 SBSQ HOSP IP/OBS MODERATE 35: CPT

## 2025-03-20 PROCEDURE — 74018 RADEX ABDOMEN 1 VIEW: CPT | Mod: 26

## 2025-03-20 RX ORDER — OXYCODONE HYDROCHLORIDE 30 MG/1
10 TABLET ORAL EVERY 4 HOURS
Refills: 0 | Status: DISCONTINUED | OUTPATIENT
Start: 2025-03-20 | End: 2025-03-25

## 2025-03-20 RX ORDER — OXYCODONE HYDROCHLORIDE 30 MG/1
5 TABLET ORAL EVERY 4 HOURS
Refills: 0 | Status: DISCONTINUED | OUTPATIENT
Start: 2025-03-20 | End: 2025-03-25

## 2025-03-20 RX ORDER — ONDANSETRON HCL/PF 4 MG/2 ML
4 VIAL (ML) INJECTION EVERY 8 HOURS
Refills: 0 | Status: DISCONTINUED | OUTPATIENT
Start: 2025-03-20 | End: 2025-03-25

## 2025-03-20 RX ORDER — ALPRAZOLAM 0.5 MG
0.5 TABLET, EXTENDED RELEASE 24 HR ORAL DAILY
Refills: 0 | Status: DISCONTINUED | OUTPATIENT
Start: 2025-03-20 | End: 2025-03-25

## 2025-03-20 RX ORDER — INSULIN GLARGINE-YFGN 100 [IU]/ML
15 INJECTION, SOLUTION SUBCUTANEOUS EVERY MORNING
Refills: 0 | Status: DISCONTINUED | OUTPATIENT
Start: 2025-03-20 | End: 2025-03-25

## 2025-03-20 RX ORDER — POLYETHYLENE GLYCOL 3350 17 G/17G
17 POWDER, FOR SOLUTION ORAL DAILY
Refills: 0 | Status: DISCONTINUED | OUTPATIENT
Start: 2025-03-20 | End: 2025-03-25

## 2025-03-20 RX ADMIN — Medication 2 MILLIGRAM(S): at 21:35

## 2025-03-20 RX ADMIN — SERTRALINE 250 MILLIGRAM(S): 100 TABLET, FILM COATED ORAL at 21:27

## 2025-03-20 RX ADMIN — Medication 2 TABLET(S): at 21:26

## 2025-03-20 RX ADMIN — POLYETHYLENE GLYCOL 3350 17 GRAM(S): 17 POWDER, FOR SOLUTION ORAL at 09:18

## 2025-03-20 RX ADMIN — CELECOXIB 200 MILLIGRAM(S): 50 CAPSULE ORAL at 10:00

## 2025-03-20 RX ADMIN — INSULIN LISPRO 3: 100 INJECTION, SOLUTION INTRAVENOUS; SUBCUTANEOUS at 17:17

## 2025-03-20 RX ADMIN — INSULIN GLARGINE-YFGN 15 UNIT(S): 100 INJECTION, SOLUTION SUBCUTANEOUS at 09:10

## 2025-03-20 RX ADMIN — INSULIN LISPRO 3: 100 INJECTION, SOLUTION INTRAVENOUS; SUBCUTANEOUS at 12:25

## 2025-03-20 RX ADMIN — Medication 650 MILLIGRAM(S): at 13:00

## 2025-03-20 RX ADMIN — Medication 650 MILLIGRAM(S): at 05:30

## 2025-03-20 RX ADMIN — Medication 2 MILLIGRAM(S): at 14:33

## 2025-03-20 RX ADMIN — Medication 650 MILLIGRAM(S): at 17:19

## 2025-03-20 RX ADMIN — Medication 1 TABLET(S): at 09:11

## 2025-03-20 RX ADMIN — CELECOXIB 200 MILLIGRAM(S): 50 CAPSULE ORAL at 09:15

## 2025-03-20 RX ADMIN — Medication 650 MILLIGRAM(S): at 12:18

## 2025-03-20 RX ADMIN — Medication 2 MILLIGRAM(S): at 21:03

## 2025-03-20 RX ADMIN — MIRTAZAPINE 15 MILLIGRAM(S): 30 TABLET, FILM COATED ORAL at 21:26

## 2025-03-20 RX ADMIN — FENOFIBRATE 145 MILLIGRAM(S): 160 TABLET ORAL at 09:15

## 2025-03-20 RX ADMIN — INSULIN LISPRO 3: 100 INJECTION, SOLUTION INTRAVENOUS; SUBCUTANEOUS at 21:27

## 2025-03-20 RX ADMIN — OXYCODONE HYDROCHLORIDE 10 MILLIGRAM(S): 30 TABLET ORAL at 18:15

## 2025-03-20 RX ADMIN — METOPROLOL SUCCINATE 100 MILLIGRAM(S): 50 TABLET, EXTENDED RELEASE ORAL at 09:07

## 2025-03-20 RX ADMIN — Medication 5 MILLIGRAM(S): at 21:27

## 2025-03-20 RX ADMIN — Medication 40 MILLIGRAM(S): at 09:07

## 2025-03-20 RX ADMIN — INSULIN LISPRO 2: 100 INJECTION, SOLUTION INTRAVENOUS; SUBCUTANEOUS at 09:13

## 2025-03-20 RX ADMIN — Medication 650 MILLIGRAM(S): at 18:15

## 2025-03-20 RX ADMIN — Medication 650 MILLIGRAM(S): at 05:02

## 2025-03-20 RX ADMIN — ATORVASTATIN CALCIUM 40 MILLIGRAM(S): 80 TABLET, FILM COATED ORAL at 21:26

## 2025-03-20 RX ADMIN — Medication 2 MILLIGRAM(S): at 14:45

## 2025-03-20 RX ADMIN — OXYCODONE HYDROCHLORIDE 10 MILLIGRAM(S): 30 TABLET ORAL at 17:15

## 2025-03-20 NOTE — PROGRESS NOTE ADULT - ASSESSMENT
71 yo WF presents with progressive back and R thigh pain. patient underwent lumbar lami/fusion L4-S1 about 1-1/2 yrs ago. Patient was doing well immediately post op but notes progressive c/o back and R thigh pain. Studies reveal adjacent level DDD with spinal stenosis and migration of the R L4 pedical screw thru the superior endplate of L4. Patient failed nonoperative modalities and underwent expl fusion, removal hardware, lami L3/4, laminotomy L2/3, fusion L3-S1, TLIF L3/4, cage, instrumentation, LBG, BMP on 3/18/25.    POD #2  Discontinue PCA  Start OOB/PT  Monitor JPs  Monitor labs  Monitor U/O-may D/C louie  Abdominal xrays done this AM-report pending  Medical management

## 2025-03-20 NOTE — PROGRESS NOTE ADULT - SUBJECTIVE AND OBJECTIVE BOX
Patient is a 70y old  Female who presents with a chief complaint of     BRIEF HOSPITAL COURSE: 69 yo female with PMHx DM2, HTN, HLD, anxiety, depression, CAD, chronic lower back pain, DHRUV, hx lumbar fusion presents for scheduled exploration fusion l4-s1 removal hardware on 3/18/25.     s/p laminectomy L3-L4 with osteotomy and fusion 3/18  EBL 600cc, received 250 cell saver    3/19 pt c/o feeling nauseous, epigastric abd pain that is intermittent crampy. denies having BM in few days. lower back pain is still present. also having pain in upper back     PAST MEDICAL & SURGICAL HISTORY:  Renal calculi  DM type 2 (diabetes mellitus, type 2)  HTN (hypertension)  Lower back pain  HLD (hyperlipidemia)  Anxiety and depression  Arthritis  Torn meniscus  Gastropresis  Diverticulosis  History of myocardial infarction  Kidney disease  History of hepatitis C  Morbid obesity  Hiatal hernia  GERD (gastroesophageal reflux disease)  Umbilical hernia  CAD (coronary artery disease)  Heart murmur  Insomnia  History of diverticulitis  Stage 3 chronic kidney disease  Marijuana use  Endometrial thickening on ultrasound  DHRUV (obstructive sleep apnea)  H/O peripheral neuropathy  History of lumbar laminectomy  History of lumbar fusion  History of 2  sections  History of colonoscopy  History of percutaneous angioplasty  H/O arthroscopy of left knee  S/P cardiac catheterization      MEDICATIONS  (STANDING):  acetaminophen     Tablet .. 650 milliGRAM(s) Oral every 6 hours  acetaminophen   IVPB .. 1000 milliGRAM(s) IV Intermittent once  atorvastatin 40 milliGRAM(s) Oral at bedtime  celecoxib 200 milliGRAM(s) Oral daily  dextrose 5%. 1000 milliLiter(s) (50 mL/Hr) IV Continuous <Continuous>  dextrose 5%. 1000 milliLiter(s) (100 mL/Hr) IV Continuous <Continuous>  dextrose 50% Injectable 25 Gram(s) IV Push once  dextrose 50% Injectable 12.5 Gram(s) IV Push once  dextrose 50% Injectable 25 Gram(s) IV Push once  enalapril 5 milliGRAM(s) Oral at bedtime  fenofibrate Tablet 145 milliGRAM(s) Oral daily  glucagon  Injectable 1 milliGRAM(s) IntraMuscular once  HYDROmorphone PCA (1 mG/mL) 30 milliLiter(s) PCA Continuous PCA Continuous  insulin glargine Injectable (LANTUS) 15 Unit(s) SubCutaneous every morning  insulin lispro (ADMELOG) corrective regimen sliding scale   SubCutaneous Before meals and at bedtime  lactated ringers. 1000 milliLiter(s) (75 mL/Hr) IV Continuous <Continuous>  metoprolol succinate  milliGRAM(s) Oral daily  mirtazapine 15 milliGRAM(s) Oral at bedtime  multivitamin 1 Tablet(s) Oral daily  pantoprazole    Tablet 40 milliGRAM(s) Oral daily  polyethylene glycol 3350 17 Gram(s) Oral daily  senna 2 Tablet(s) Oral at bedtime  sertraline 250 milliGRAM(s) Oral at bedtime    MEDICATIONS  (PRN):  ALPRAZolam 1 milliGRAM(s) Oral daily PRN for anxiety  bisacodyl 5 milliGRAM(s) Oral every 12 hours PRN Constipation  dextrose Oral Gel 15 Gram(s) Oral once PRN Blood Glucose LESS THAN 70 milliGRAM(s)/deciliter  HYDROmorphone PCA (1 mG/mL) Rescue Clinician Bolus 0.5 milliGRAM(s) IV Push every 15 minutes PRN for Pain Scale GREATER THAN 6  naloxone Injectable 0.1 milliGRAM(s) IV Push every 3 minutes PRN For ANY of the following changes in patient status:  A. RR LESS THAN 10 breaths per minute, B. Oxygen saturation LESS THAN 90%, C. Sedation score of 6  ondansetron Injectable 4 milliGRAM(s) IV Push every 8 hours PRN Nausea and/or Vomiting      Vital Signs Last 24 Hrs  T(C): 36.9 (20 Mar 2025 09:02), Max: 37.7 (20 Mar 2025 06:00)  T(F): 98.4 (20 Mar 2025 09:02), Max: 99.8 (20 Mar 2025 06:00)  HR: 77 (20 Mar 2025 07:00) (63 - 87)  BP: 123/46 (20 Mar 2025 07:00) (101/56 - 133/60)  BP(mean): 68 (20 Mar 2025 07:00) (68 - 100)  RR: 15 (20 Mar 2025 07:00) (9 - 19)  SpO2: 92% (20 Mar 2025 07:00) (92% - 98%)    Parameters below as of 20 Mar 2025 07:00  Patient On (Oxygen Delivery Method): nasal cannula  O2 Flow (L/min): 2    I&O's Detail    19 Mar 2025 07:01  -  20 Mar 2025 07:00  --------------------------------------------------------  IN:    Lactated Ringers: 888 mL  Total IN: 888 mL    OUT:    Bulb (mL): 2 mL    Bulb (mL): 150 mL    Indwelling Catheter - Urethral (mL): 475 mL  Total OUT: 627 mL    Total NET: 261 mL      LABS:                      8.6    9.67  )-----------( 149      ( 20 Mar 2025 05:45 )             28.1     03-20    137  |  110[H]  |  31[H]  ----------------------------<  272[H]  4.9   |  24  |  1.21    Ca    8.4[L]      20 Mar 2025 05:45      Urinalysis Basic - ( 20 Mar 2025 05:45 )    Color: x / Appearance: x / SG: x / pH: x  Gluc: 272 mg/dL / Ketone: x  / Bili: x / Urobili: x   Blood: x / Protein: x / Nitrite: x   Leuk Esterase: x / RBC: x / WBC x   Sq Epi: x / Non Sq Epi: x / Bacteria: x      ABG - ( 18 Mar 2025 12:42 )  pH, Arterial: 7.30  pH, Blood: x     /  pCO2: 29    /  pO2: 271   / HCO3: 14    / Base Excess: -10.7 /  SaO2: 99.0        CULTURES:    Physical Examination:    General: No acute distress.    HEENT: dry lips, face swelling improving   PULM: Clear to auscultation bilaterally, no wheezing  CVS: Regular rate and rhythm, no murmurs  ABD: Soft, nondistended, difficult to hear BS, tender in epigastric region  EXT: No LE edema b/l  SKIN: Warm, dry  NEURO: Alert, oriented, interactive, LE weak b/l. able to only slightly lift legs off the bed, limited due to back pain,      DEVICES:   louie  JAMSHID x 2 - old blood    RADIOLOGY:      Patient is a 70y old  Female who presents with a chief complaint of     BRIEF HOSPITAL COURSE: 69 yo female with PMHx DM2, HTN, HLD, anxiety, depression, CAD, chronic lower back pain, DHRUV, hx lumbar fusion presents for scheduled exploration fusion l4-s1 removal hardware on 3/18/25.     s/p laminectomy L3-L4 with osteotomy, fusion L3 - S1 3/18  EBL 600cc, received 250 cell saver    3/19 pt c/o feeling nauseous, epigastric abd pain that is intermittent crampy. denies having BM in few days. lower back pain is still present. also having pain in upper back     PAST MEDICAL & SURGICAL HISTORY:  Renal calculi  DM type 2 (diabetes mellitus, type 2)  HTN (hypertension)  Lower back pain  HLD (hyperlipidemia)  Anxiety and depression  Arthritis  Torn meniscus  Gastropresis  Diverticulosis  History of myocardial infarction  Kidney disease  History of hepatitis C  Morbid obesity  Hiatal hernia  GERD (gastroesophageal reflux disease)  Umbilical hernia  CAD (coronary artery disease)  Heart murmur  Insomnia  History of diverticulitis  Stage 3 chronic kidney disease  Marijuana use  Endometrial thickening on ultrasound  DHRUV (obstructive sleep apnea)  H/O peripheral neuropathy  History of lumbar laminectomy  History of lumbar fusion  History of 2  sections  History of colonoscopy  History of percutaneous angioplasty  H/O arthroscopy of left knee  S/P cardiac catheterization      MEDICATIONS  (STANDING):  acetaminophen     Tablet .. 650 milliGRAM(s) Oral every 6 hours  acetaminophen   IVPB .. 1000 milliGRAM(s) IV Intermittent once  atorvastatin 40 milliGRAM(s) Oral at bedtime  celecoxib 200 milliGRAM(s) Oral daily  dextrose 5%. 1000 milliLiter(s) (50 mL/Hr) IV Continuous <Continuous>  dextrose 5%. 1000 milliLiter(s) (100 mL/Hr) IV Continuous <Continuous>  dextrose 50% Injectable 25 Gram(s) IV Push once  dextrose 50% Injectable 12.5 Gram(s) IV Push once  dextrose 50% Injectable 25 Gram(s) IV Push once  enalapril 5 milliGRAM(s) Oral at bedtime  fenofibrate Tablet 145 milliGRAM(s) Oral daily  glucagon  Injectable 1 milliGRAM(s) IntraMuscular once  HYDROmorphone PCA (1 mG/mL) 30 milliLiter(s) PCA Continuous PCA Continuous  insulin glargine Injectable (LANTUS) 15 Unit(s) SubCutaneous every morning  insulin lispro (ADMELOG) corrective regimen sliding scale   SubCutaneous Before meals and at bedtime  lactated ringers. 1000 milliLiter(s) (75 mL/Hr) IV Continuous <Continuous>  metoprolol succinate  milliGRAM(s) Oral daily  mirtazapine 15 milliGRAM(s) Oral at bedtime  multivitamin 1 Tablet(s) Oral daily  pantoprazole    Tablet 40 milliGRAM(s) Oral daily  polyethylene glycol 3350 17 Gram(s) Oral daily  senna 2 Tablet(s) Oral at bedtime  sertraline 250 milliGRAM(s) Oral at bedtime    MEDICATIONS  (PRN):  ALPRAZolam 1 milliGRAM(s) Oral daily PRN for anxiety  bisacodyl 5 milliGRAM(s) Oral every 12 hours PRN Constipation  dextrose Oral Gel 15 Gram(s) Oral once PRN Blood Glucose LESS THAN 70 milliGRAM(s)/deciliter  HYDROmorphone PCA (1 mG/mL) Rescue Clinician Bolus 0.5 milliGRAM(s) IV Push every 15 minutes PRN for Pain Scale GREATER THAN 6  naloxone Injectable 0.1 milliGRAM(s) IV Push every 3 minutes PRN For ANY of the following changes in patient status:  A. RR LESS THAN 10 breaths per minute, B. Oxygen saturation LESS THAN 90%, C. Sedation score of 6  ondansetron Injectable 4 milliGRAM(s) IV Push every 8 hours PRN Nausea and/or Vomiting      Vital Signs Last 24 Hrs  T(C): 36.9 (20 Mar 2025 09:02), Max: 37.7 (20 Mar 2025 06:00)  T(F): 98.4 (20 Mar 2025 09:02), Max: 99.8 (20 Mar 2025 06:00)  HR: 77 (20 Mar 2025 07:00) (63 - 87)  BP: 123/46 (20 Mar 2025 07:00) (101/56 - 133/60)  BP(mean): 68 (20 Mar 2025 07:00) (68 - 100)  RR: 15 (20 Mar 2025 07:00) (9 - 19)  SpO2: 92% (20 Mar 2025 07:00) (92% - 98%)    Parameters below as of 20 Mar 2025 07:00  Patient On (Oxygen Delivery Method): nasal cannula  O2 Flow (L/min): 2    I&O's Detail    19 Mar 2025 07:01  -  20 Mar 2025 07:00  --------------------------------------------------------  IN:    Lactated Ringers: 888 mL  Total IN: 888 mL    OUT:    Bulb (mL): 2 mL    Bulb (mL): 150 mL    Indwelling Catheter - Urethral (mL): 475 mL  Total OUT: 627 mL    Total NET: 261 mL      LABS:                      8.6    9.67  )-----------( 149      ( 20 Mar 2025 05:45 )             28.1     03-20    137  |  110[H]  |  31[H]  ----------------------------<  272[H]  4.9   |  24  |  1.21    Ca    8.4[L]      20 Mar 2025 05:45      Urinalysis Basic - ( 20 Mar 2025 05:45 )    Color: x / Appearance: x / SG: x / pH: x  Gluc: 272 mg/dL / Ketone: x  / Bili: x / Urobili: x   Blood: x / Protein: x / Nitrite: x   Leuk Esterase: x / RBC: x / WBC x   Sq Epi: x / Non Sq Epi: x / Bacteria: x      ABG - ( 18 Mar 2025 12:42 )  pH, Arterial: 7.30  pH, Blood: x     /  pCO2: 29    /  pO2: 271   / HCO3: 14    / Base Excess: -10.7 /  SaO2: 99.0        CULTURES:    Physical Examination:    General: No acute distress.    HEENT: dry lips, face swelling improving   PULM: Clear to auscultation bilaterally, no wheezing  CVS: Regular rate and rhythm, no murmurs  ABD: Soft, nondistended, difficult to hear BS, tender in epigastric region  EXT: No LE edema b/l  SKIN: Warm, dry  NEURO: Alert, oriented, interactive, LE weak b/l. able to only slightly lift legs off the bed, limited due to back pain,      DEVICES:   liban BREEN x 2 - old blood    RADIOLOGY:      Patient is a 70y old  Female who presents with a chief complaint of     BRIEF HOSPITAL COURSE: 69 yo female with PMHx DM2, HTN, HLD, anxiety, depression, CAD, chronic lower back pain, DHRUV, hx lumbar fusion presents for scheduled exploration fusion l4-s1 removal hardware on 3/18/25.     s/p laminectomy L3-L4 with osteotomy, fusion L3 - S1 3/18  EBL 600cc, received 250 cell saver    3/20 pt c/o feeling nauseous, epigastric abd pain that is intermittent crampy. denies having BM in few days. lower back pain is still present. also having pain in upper back     PAST MEDICAL & SURGICAL HISTORY:  Renal calculi  DM type 2 (diabetes mellitus, type 2)  HTN (hypertension)  Lower back pain  HLD (hyperlipidemia)  Anxiety and depression  Arthritis  Torn meniscus  Gastropresis  Diverticulosis  History of myocardial infarction  Kidney disease  History of hepatitis C  Morbid obesity  Hiatal hernia  GERD (gastroesophageal reflux disease)  Umbilical hernia  CAD (coronary artery disease)  Heart murmur  Insomnia  History of diverticulitis  Stage 3 chronic kidney disease  Marijuana use  Endometrial thickening on ultrasound  DHRUV (obstructive sleep apnea)  H/O peripheral neuropathy  History of lumbar laminectomy  History of lumbar fusion  History of 2  sections  History of colonoscopy  History of percutaneous angioplasty  H/O arthroscopy of left knee  S/P cardiac catheterization      MEDICATIONS  (STANDING):  acetaminophen     Tablet .. 650 milliGRAM(s) Oral every 6 hours  acetaminophen   IVPB .. 1000 milliGRAM(s) IV Intermittent once  atorvastatin 40 milliGRAM(s) Oral at bedtime  celecoxib 200 milliGRAM(s) Oral daily  dextrose 5%. 1000 milliLiter(s) (50 mL/Hr) IV Continuous <Continuous>  dextrose 5%. 1000 milliLiter(s) (100 mL/Hr) IV Continuous <Continuous>  dextrose 50% Injectable 25 Gram(s) IV Push once  dextrose 50% Injectable 12.5 Gram(s) IV Push once  dextrose 50% Injectable 25 Gram(s) IV Push once  enalapril 5 milliGRAM(s) Oral at bedtime  fenofibrate Tablet 145 milliGRAM(s) Oral daily  glucagon  Injectable 1 milliGRAM(s) IntraMuscular once  HYDROmorphone PCA (1 mG/mL) 30 milliLiter(s) PCA Continuous PCA Continuous  insulin glargine Injectable (LANTUS) 15 Unit(s) SubCutaneous every morning  insulin lispro (ADMELOG) corrective regimen sliding scale   SubCutaneous Before meals and at bedtime  lactated ringers. 1000 milliLiter(s) (75 mL/Hr) IV Continuous <Continuous>  metoprolol succinate  milliGRAM(s) Oral daily  mirtazapine 15 milliGRAM(s) Oral at bedtime  multivitamin 1 Tablet(s) Oral daily  pantoprazole    Tablet 40 milliGRAM(s) Oral daily  polyethylene glycol 3350 17 Gram(s) Oral daily  senna 2 Tablet(s) Oral at bedtime  sertraline 250 milliGRAM(s) Oral at bedtime    MEDICATIONS  (PRN):  ALPRAZolam 1 milliGRAM(s) Oral daily PRN for anxiety  bisacodyl 5 milliGRAM(s) Oral every 12 hours PRN Constipation  dextrose Oral Gel 15 Gram(s) Oral once PRN Blood Glucose LESS THAN 70 milliGRAM(s)/deciliter  HYDROmorphone PCA (1 mG/mL) Rescue Clinician Bolus 0.5 milliGRAM(s) IV Push every 15 minutes PRN for Pain Scale GREATER THAN 6  naloxone Injectable 0.1 milliGRAM(s) IV Push every 3 minutes PRN For ANY of the following changes in patient status:  A. RR LESS THAN 10 breaths per minute, B. Oxygen saturation LESS THAN 90%, C. Sedation score of 6  ondansetron Injectable 4 milliGRAM(s) IV Push every 8 hours PRN Nausea and/or Vomiting      Vital Signs Last 24 Hrs  T(C): 36.9 (20 Mar 2025 09:02), Max: 37.7 (20 Mar 2025 06:00)  T(F): 98.4 (20 Mar 2025 09:02), Max: 99.8 (20 Mar 2025 06:00)  HR: 77 (20 Mar 2025 07:00) (63 - 87)  BP: 123/46 (20 Mar 2025 07:00) (101/56 - 133/60)  BP(mean): 68 (20 Mar 2025 07:00) (68 - 100)  RR: 15 (20 Mar 2025 07:00) (9 - 19)  SpO2: 92% (20 Mar 2025 07:00) (92% - 98%)    Parameters below as of 20 Mar 2025 07:00  Patient On (Oxygen Delivery Method): nasal cannula  O2 Flow (L/min): 2    I&O's Detail    19 Mar 2025 07:01  -  20 Mar 2025 07:00  --------------------------------------------------------  IN:    Lactated Ringers: 888 mL  Total IN: 888 mL    OUT:    Bulb (mL): 2 mL    Bulb (mL): 150 mL    Indwelling Catheter - Urethral (mL): 475 mL  Total OUT: 627 mL    Total NET: 261 mL      LABS:                      8.6    9.67  )-----------( 149      ( 20 Mar 2025 05:45 )             28.1     03-20    137  |  110[H]  |  31[H]  ----------------------------<  272[H]  4.9   |  24  |  1.21    Ca    8.4[L]      20 Mar 2025 05:45      Urinalysis Basic - ( 20 Mar 2025 05:45 )    Color: x / Appearance: x / SG: x / pH: x  Gluc: 272 mg/dL / Ketone: x  / Bili: x / Urobili: x   Blood: x / Protein: x / Nitrite: x   Leuk Esterase: x / RBC: x / WBC x   Sq Epi: x / Non Sq Epi: x / Bacteria: x      ABG - ( 18 Mar 2025 12:42 )  pH, Arterial: 7.30  pH, Blood: x     /  pCO2: 29    /  pO2: 271   / HCO3: 14    / Base Excess: -10.7 /  SaO2: 99.0        CULTURES:    Physical Examination:    General: No acute distress.    HEENT: dry lips, face swelling improving   PULM: Clear to auscultation bilaterally, no wheezing  CVS: Regular rate and rhythm, no murmurs  ABD: Soft, nondistended, difficult to hear BS, tender in epigastric region  EXT: No LE edema b/l  SKIN: Warm, dry  NEURO: Alert, oriented, interactive, LE weak b/l. able to only slightly lift legs off the bed, limited due to back pain,      DEVICES:   liban BREEN x 2 - old blood    RADIOLOGY:

## 2025-03-20 NOTE — PROGRESS NOTE ADULT - ASSESSMENT
ASSESSMENT  69 yo female with PMHx DM2, HTN, HLD, anxiety, depression, CAD, chronic lower back pain, DHRUV, hx lumbar fusion presents for scheduled exploration fusion l4-s1 removal hardware on 3/18/25.     s/p laminectomy L3-L4 with osteotomy and fusion 3/18  EBL 600cc, received 250 cell saver    PLAN   - mentation intact. pain control prn IV tylenol, cont sertraline, mirtazapine. dc dilaudid PCA, transition to oral pain meds  - BP okay. in sinus rhythm. cont home dose toprol 100mg qd, enalapril 5mg, statin, fenofibrate  - on 2L nc sating 96-97%. encourage incentive spirometer  - check abd xray. PPI. bowel regimen prn. zofran for nausea  - lower IV fluids to LR @75. trial of void monitor I & Os   -  hyperglycemic still, inc lantus 10units -> 15units qAM. a1c 8.1. hold metformin, PO DM meds. ISS. BGMs ac hs.   - JAMSHID drain x2  mgmt as per sx. monitor output - scant sanguinous   - s/p post op abx IV ancef. monitor WBC and temps  - Hgb drifting down 10-> 8.6. repeat CBC 12:00. no chemical DVT ppx d/t bleeding risk , recent surgery. cont SCDs  PT eval     Dispo: SICU. dc dilaudid PCA, monitor JAMSHID output. xray abd. trial of void . PT    Will discuss with Dr. No ASSESSMENT  69 yo female with PMHx DM2, HTN, HLD, anxiety, depression, CAD, chronic lower back pain, DHRUV, hx lumbar fusion presents for scheduled exploration fusion l4-s1 removal hardware on 3/18/25.     s/p laminectomy L3-L4 with osteotomy, fusion L3 - S1 3/18  EBL 600cc, received 250 cell saver    PLAN   - mentation intact. pain control prn IV tylenol, cont sertraline, mirtazapine. dc dilaudid PCA, transition to oral pain meds  - BP okay. in sinus rhythm. cont home dose toprol 100mg qd, enalapril 5mg, statin, fenofibrate  - on 2L nc sating 96-97%. encourage incentive spirometer  - check abd xray. PPI. bowel regimen prn. zofran for nausea  - lower IV fluids to LR @75. trial of void monitor I & Os   -  hyperglycemic still, inc lantus 10units -> 15units qAM. a1c 8.1. hold metformin, PO DM meds. ISS. BGMs ac hs.   - JAMSHID drain x2  mgmt as per sx. monitor output - scant sanguinous   - s/p post op abx IV ancef. monitor WBC and temps  - Hgb drifting down 10-> 8.6. repeat CBC 12:00. no chemical DVT ppx d/t bleeding risk , recent surgery. cont SCDs  PT eval     Dispo: SICU. dc dilaudid PCA, monitor JAMSHID output. xray abd. trial of void . PT    Will discuss with Dr. No ASSESSMENT  69 yo female with PMHx DM2, HTN, HLD, anxiety, depression, CAD, chronic lower back pain, DHRUV, hx lumbar fusion presents for scheduled exploration fusion l4-s1 removal hardware on 3/18/25.     s/p laminectomy L3-L4 with osteotomy, fusion L3 - S1 3/18  EBL 600cc, received 250 cell saver    PLAN   - mentation intact. pain control prn IV tylenol, cont sertraline, mirtazapine. dc dilaudid PCA, transition to oral pain meds  - BP okay. in sinus rhythm. cont home dose toprol 100mg qd, enalapril 5mg, statin, fenofibrate  - on 2L nc sating 96-97%. encourage incentive spirometer  - PO diet as tolerated.. PPI. bowel regimen prn. zofran for nausea. Xray abd - no obvious dilated bowel loops. f/u official read.   -  dc IV fluids. trial of void monitor I & Os   -  hyperglycemic still, inc lantus 10units -> 15units qAM. a1c 8.1. hold metformin, PO DM meds. ISS. BGMs ac hs.   - JAMSHID drain x2  mgmt as per sx. monitor output - scant sanguinous   - s/p post op abx IV ancef. monitor WBC and temps  - Hgb drifting down 10-> 8.6. repeat CBC 12:00. no chemical DVT ppx d/t bleeding risk , recent surgery. cont SCDs  PT eval     Dispo: SICU. dc dilaudid PCA, monitor JAMSHID output.  Trend H/H. trial of void. OOB to chair. PT    Will discuss with Dr. No

## 2025-03-20 NOTE — PROGRESS NOTE ADULT - SUBJECTIVE AND OBJECTIVE BOX
71 yo WF presents with progressive back and R thigh pain. patient underwent lumbar lami/fusion L4-S1 about 1-1/2 yrs ago. Patient was doing well immediately post op but notes progressive c/o back and R thigh pain. Studies reveal adjacent level DDD with spinal stenosis and migration of the R L4 pedical screw thru the superior endplate of L4. Patient failed nonoperative modalities and underwent expl fusion, removal hardware, lami L3/4, laminotomy L2/3, fusion L3-S1, TLIF L3/4, cage, instrumentation, LBG, BMP on 3/18/25.    3/19/25 Patient with incisional pain but no thigh pain  3/20/25 Patient c/o incisional and abdominal pain today    PAST MEDICAL & SURGICAL HISTORY:  Renal calculi      DM type 2 (diabetes mellitus, type 2)      HTN (hypertension)      Lower back pain      HLD (hyperlipidemia)      Anxiety and depression      Arthritis      Torn meniscus      Gastroparesis      Diverticulosis      History of myocardial infarction      Kidney disease      History of hepatitis C      Morbid obesity      Hiatal hernia      GERD (gastroesophageal reflux disease)      Umbilical hernia      CAD (coronary artery disease)      Heart murmur      Insomnia      History of diverticulitis      Stage 3 chronic kidney disease      Marijuana use      Endometrial thickening on ultrasound      DHRUV (obstructive sleep apnea)      H/O peripheral neuropathy      History of lumbar laminectomy      History of lumbar fusion      History of 2  sections      History of colonoscopy      History of percutaneous angioplasty      H/O arthroscopy of left knee      S/P cardiac catheterization    MEDICATIONS  (STANDING):  acetaminophen     Tablet .. 650 milliGRAM(s) Oral every 6 hours  acetaminophen   IVPB .. 1000 milliGRAM(s) IV Intermittent once  atorvastatin 40 milliGRAM(s) Oral at bedtime  ceFAZolin  Injectable. 2000 milliGRAM(s) IV Push every 8 hours  dextrose 5%. 1000 milliLiter(s) (50 mL/Hr) IV Continuous <Continuous>  dextrose 5%. 1000 milliLiter(s) (100 mL/Hr) IV Continuous <Continuous>  dextrose 50% Injectable 25 Gram(s) IV Push once  dextrose 50% Injectable 12.5 Gram(s) IV Push once  dextrose 50% Injectable 25 Gram(s) IV Push once  enalapril 5 milliGRAM(s) Oral at bedtime  fenofibrate Tablet 145 milliGRAM(s) Oral daily  glucagon  Injectable 1 milliGRAM(s) IntraMuscular once  HYDROmorphone PCA (1 mG/mL) 30 milliLiter(s) PCA Continuous PCA Continuous  insulin glargine Injectable (LANTUS) 10 Unit(s) SubCutaneous every morning  insulin lispro (ADMELOG) corrective regimen sliding scale   SubCutaneous Before meals and at bedtime  lactated ringers. 1000 milliLiter(s) (75 mL/Hr) IV Continuous <Continuous>  metoprolol succinate  milliGRAM(s) Oral daily  mirtazapine 15 milliGRAM(s) Oral at bedtime  multivitamin 1 Tablet(s) Oral daily  pantoprazole    Tablet 40 milliGRAM(s) Oral daily  senna 2 Tablet(s) Oral at bedtime  sertraline 250 milliGRAM(s) Oral at bedtime    MEDICATIONS  (PRN):  ALPRAZolam 1 milliGRAM(s) Oral daily PRN for anxiety  bisacodyl 5 milliGRAM(s) Oral every 12 hours PRN Constipation  dextrose Oral Gel 15 Gram(s) Oral once PRN Blood Glucose LESS THAN 70 milliGRAM(s)/deciliter  HYDROmorphone PCA (1 mG/mL) Rescue Clinician Bolus 0.5 milliGRAM(s) IV Push every 15 minutes PRN for Pain Scale GREATER THAN 6  naloxone Injectable 0.1 milliGRAM(s) IV Push every 3 minutes PRN For ANY of the following changes in patient status:  A. RR LESS THAN 10 breaths per minute, B. Oxygen saturation LESS THAN 90%, C. Sedation score of 6    Allergies    strawberry (Other (Moderate))  No Known Drug Allergies  copper (Hives)    Intolerances    morphine (Other (Mild))  codeine (Other (Mild))    PE:    ICU Vital Signs Last 24 Hrs  T(C): 36.9 (20 Mar 2025 09:02), Max: 37.7 (20 Mar 2025 06:00)  T(F): 98.4 (20 Mar 2025 09:02), Max: 99.8 (20 Mar 2025 06:00)  HR: 77 (20 Mar 2025 07:00) (63 - 87)  BP: 123/46 (20 Mar 2025 07:00) (101/56 - 133/60)  BP(mean): 68 (20 Mar 2025 07:00) (68 - 100)  ABP: --  ABP(mean): --  RR: 15 (20 Mar 2025 07:00) (9 - 19)  SpO2: 92% (20 Mar 2025 07:00) (92% - 98%)    O2 Parameters below as of 20 Mar 2025 07:00  Patient On (Oxygen Delivery Method): nasal cannula  O2 Flow (L/min): 2      Patient laying flat in bed with c/o incisional pain and abdominal pain  Tolerating diet  Vogt with adequate U/O  Dressing D&I  Deep JAMSHID with 150 cc last 24hr/30 last 12 hrs  Superficial JAMSHID with scant drainage  Neuro without deficits    LABS             CBC Full  -  ( 20 Mar 2025 05:45 )  WBC Count : 9.67 K/uL  RBC Count : 3.05 M/uL  Hemoglobin : 8.6 g/dL  Hematocrit : 28.1 %  Platelet Count - Automated : 149 K/uL  Mean Cell Volume : 92.1 fl  Mean Cell Hemoglobin : 28.2 pg  Mean Cell Hemoglobin Concentration : 30.6 g/dL  Auto Neutrophil # : 7.97 K/uL  Auto Lymphocyte # : 0.79 K/uL  Auto Monocyte # : 0.72 K/uL  Auto Eosinophil # : 0.03 K/uL  Auto Basophil # : 0.04 K/uL  Auto Neutrophil % : 82.5 %  Auto Lymphocyte % : 8.2 %  Auto Monocyte % : 7.4 %  Auto Eosinophil % : 0.3 %  Auto Basophil % : 0.4 %    0320    137  |  110[H]  |  31[H]  ----------------------------<  272[H]  4.9   |  24  |  1.21    Ca    8.4[L]      20 Mar 2025 05:45

## 2025-03-21 LAB
ANION GAP SERPL CALC-SCNC: 2 MMOL/L — LOW (ref 5–17)
BASOPHILS # BLD AUTO: 0.06 K/UL — SIGNIFICANT CHANGE UP (ref 0–0.2)
BASOPHILS NFR BLD AUTO: 0.8 % — SIGNIFICANT CHANGE UP (ref 0–2)
BUN SERPL-MCNC: 30 MG/DL — HIGH (ref 7–23)
CALCIUM SERPL-MCNC: 9.1 MG/DL — SIGNIFICANT CHANGE UP (ref 8.5–10.1)
CHLORIDE SERPL-SCNC: 111 MMOL/L — HIGH (ref 96–108)
CO2 SERPL-SCNC: 25 MMOL/L — SIGNIFICANT CHANGE UP (ref 22–31)
CREAT SERPL-MCNC: 0.99 MG/DL — SIGNIFICANT CHANGE UP (ref 0.5–1.3)
EGFR: 61 ML/MIN/1.73M2 — SIGNIFICANT CHANGE UP
EGFR: 61 ML/MIN/1.73M2 — SIGNIFICANT CHANGE UP
EOSINOPHIL # BLD AUTO: 0.1 K/UL — SIGNIFICANT CHANGE UP (ref 0–0.5)
EOSINOPHIL NFR BLD AUTO: 1.4 % — SIGNIFICANT CHANGE UP (ref 0–6)
GLUCOSE BLDC GLUCOMTR-MCNC: 229 MG/DL — HIGH (ref 70–99)
GLUCOSE BLDC GLUCOMTR-MCNC: 242 MG/DL — HIGH (ref 70–99)
GLUCOSE BLDC GLUCOMTR-MCNC: 248 MG/DL — HIGH (ref 70–99)
GLUCOSE BLDC GLUCOMTR-MCNC: 281 MG/DL — HIGH (ref 70–99)
GLUCOSE SERPL-MCNC: 243 MG/DL — HIGH (ref 70–99)
HCT VFR BLD CALC: 25.8 % — LOW (ref 34.5–45)
HGB BLD-MCNC: 8.1 G/DL — LOW (ref 11.5–15.5)
IMM GRANULOCYTES # BLD AUTO: 0.06 K/UL — SIGNIFICANT CHANGE UP (ref 0–0.07)
IMM GRANULOCYTES NFR BLD AUTO: 0.8 % — SIGNIFICANT CHANGE UP (ref 0–0.9)
LYMPHOCYTES # BLD AUTO: 1.25 K/UL — SIGNIFICANT CHANGE UP (ref 1–3.3)
LYMPHOCYTES NFR BLD AUTO: 17.6 % — SIGNIFICANT CHANGE UP (ref 13–44)
MAGNESIUM SERPL-MCNC: 2 MG/DL — SIGNIFICANT CHANGE UP (ref 1.6–2.6)
MCHC RBC-ENTMCNC: 28.1 PG — SIGNIFICANT CHANGE UP (ref 27–34)
MCHC RBC-ENTMCNC: 31.4 G/DL — LOW (ref 32–36)
MCV RBC AUTO: 89.6 FL — SIGNIFICANT CHANGE UP (ref 80–100)
MONOCYTES # BLD AUTO: 0.63 K/UL — SIGNIFICANT CHANGE UP (ref 0–0.9)
MONOCYTES NFR BLD AUTO: 8.9 % — SIGNIFICANT CHANGE UP (ref 2–14)
NEUTROPHILS # BLD AUTO: 4.99 K/UL — SIGNIFICANT CHANGE UP (ref 1.8–7.4)
NEUTROPHILS NFR BLD AUTO: 70.5 % — SIGNIFICANT CHANGE UP (ref 43–77)
NRBC # BLD AUTO: 0 K/UL — SIGNIFICANT CHANGE UP (ref 0–0)
NRBC # FLD: 0 K/UL — SIGNIFICANT CHANGE UP (ref 0–0)
NRBC BLD AUTO-RTO: 0 /100 WBCS — SIGNIFICANT CHANGE UP (ref 0–0)
PHOSPHATE SERPL-MCNC: 1.6 MG/DL — LOW (ref 2.5–4.5)
PLATELET # BLD AUTO: 152 K/UL — SIGNIFICANT CHANGE UP (ref 150–400)
PMV BLD: 10.9 FL — SIGNIFICANT CHANGE UP (ref 7–13)
POTASSIUM SERPL-MCNC: 4 MMOL/L — SIGNIFICANT CHANGE UP (ref 3.5–5.3)
POTASSIUM SERPL-SCNC: 4 MMOL/L — SIGNIFICANT CHANGE UP (ref 3.5–5.3)
RBC # BLD: 2.88 M/UL — LOW (ref 3.8–5.2)
RBC # FLD: 15.1 % — HIGH (ref 10.3–14.5)
SODIUM SERPL-SCNC: 138 MMOL/L — SIGNIFICANT CHANGE UP (ref 135–145)
SURGICAL PATHOLOGY STUDY: SIGNIFICANT CHANGE UP
WBC # BLD: 7.09 K/UL — SIGNIFICANT CHANGE UP (ref 3.8–10.5)
WBC # FLD AUTO: 7.09 K/UL — SIGNIFICANT CHANGE UP (ref 3.8–10.5)

## 2025-03-21 PROCEDURE — 99232 SBSQ HOSP IP/OBS MODERATE 35: CPT

## 2025-03-21 PROCEDURE — 99222 1ST HOSP IP/OBS MODERATE 55: CPT

## 2025-03-21 RX ORDER — SOD PHOS DI, MONO/K PHOS MONO 250 MG
1 TABLET ORAL THREE TIMES A DAY
Refills: 0 | Status: DISCONTINUED | OUTPATIENT
Start: 2025-03-21 | End: 2025-03-21

## 2025-03-21 RX ORDER — POTASSIUM PHOSPHATE, MONOBASIC POTASSIUM PHOSPHATE, DIBASIC INJECTION, 236; 224 MG/ML; MG/ML
30 SOLUTION, CONCENTRATE INTRAVENOUS ONCE
Refills: 0 | Status: COMPLETED | OUTPATIENT
Start: 2025-03-21 | End: 2025-03-21

## 2025-03-21 RX ADMIN — Medication 650 MILLIGRAM(S): at 01:06

## 2025-03-21 RX ADMIN — Medication 4 MILLIGRAM(S): at 05:06

## 2025-03-21 RX ADMIN — ATORVASTATIN CALCIUM 40 MILLIGRAM(S): 80 TABLET, FILM COATED ORAL at 21:41

## 2025-03-21 RX ADMIN — INSULIN LISPRO 3: 100 INJECTION, SOLUTION INTRAVENOUS; SUBCUTANEOUS at 17:01

## 2025-03-21 RX ADMIN — Medication 650 MILLIGRAM(S): at 05:30

## 2025-03-21 RX ADMIN — Medication 650 MILLIGRAM(S): at 13:30

## 2025-03-21 RX ADMIN — OXYCODONE HYDROCHLORIDE 10 MILLIGRAM(S): 30 TABLET ORAL at 22:31

## 2025-03-21 RX ADMIN — INSULIN LISPRO 2: 100 INJECTION, SOLUTION INTRAVENOUS; SUBCUTANEOUS at 12:34

## 2025-03-21 RX ADMIN — Medication 650 MILLIGRAM(S): at 17:01

## 2025-03-21 RX ADMIN — METOPROLOL SUCCINATE 100 MILLIGRAM(S): 50 TABLET, EXTENDED RELEASE ORAL at 10:12

## 2025-03-21 RX ADMIN — OXYCODONE HYDROCHLORIDE 10 MILLIGRAM(S): 30 TABLET ORAL at 11:02

## 2025-03-21 RX ADMIN — OXYCODONE HYDROCHLORIDE 10 MILLIGRAM(S): 30 TABLET ORAL at 04:59

## 2025-03-21 RX ADMIN — OXYCODONE HYDROCHLORIDE 10 MILLIGRAM(S): 30 TABLET ORAL at 22:01

## 2025-03-21 RX ADMIN — OXYCODONE HYDROCHLORIDE 10 MILLIGRAM(S): 30 TABLET ORAL at 12:21

## 2025-03-21 RX ADMIN — INSULIN LISPRO 2: 100 INJECTION, SOLUTION INTRAVENOUS; SUBCUTANEOUS at 21:38

## 2025-03-21 RX ADMIN — INSULIN LISPRO 2: 100 INJECTION, SOLUTION INTRAVENOUS; SUBCUTANEOUS at 08:28

## 2025-03-21 RX ADMIN — POTASSIUM PHOSPHATE, MONOBASIC POTASSIUM PHOSPHATE, DIBASIC INJECTION, 83.33 MILLIMOLE(S): 236; 224 SOLUTION, CONCENTRATE INTRAVENOUS at 08:48

## 2025-03-21 RX ADMIN — OXYCODONE HYDROCHLORIDE 10 MILLIGRAM(S): 30 TABLET ORAL at 00:33

## 2025-03-21 RX ADMIN — CELECOXIB 200 MILLIGRAM(S): 50 CAPSULE ORAL at 11:00

## 2025-03-21 RX ADMIN — FENOFIBRATE 145 MILLIGRAM(S): 160 TABLET ORAL at 10:12

## 2025-03-21 RX ADMIN — Medication 1 TABLET(S): at 10:12

## 2025-03-21 RX ADMIN — OXYCODONE HYDROCHLORIDE 10 MILLIGRAM(S): 30 TABLET ORAL at 05:30

## 2025-03-21 RX ADMIN — Medication 650 MILLIGRAM(S): at 17:12

## 2025-03-21 RX ADMIN — OXYCODONE HYDROCHLORIDE 10 MILLIGRAM(S): 30 TABLET ORAL at 01:07

## 2025-03-21 RX ADMIN — SERTRALINE 250 MILLIGRAM(S): 100 TABLET, FILM COATED ORAL at 21:40

## 2025-03-21 RX ADMIN — Medication 5 MILLIGRAM(S): at 21:41

## 2025-03-21 RX ADMIN — POLYETHYLENE GLYCOL 3350 17 GRAM(S): 17 POWDER, FOR SOLUTION ORAL at 10:12

## 2025-03-21 RX ADMIN — MIRTAZAPINE 15 MILLIGRAM(S): 30 TABLET, FILM COATED ORAL at 21:38

## 2025-03-21 RX ADMIN — Medication 650 MILLIGRAM(S): at 12:29

## 2025-03-21 RX ADMIN — CELECOXIB 200 MILLIGRAM(S): 50 CAPSULE ORAL at 10:12

## 2025-03-21 RX ADMIN — OXYCODONE HYDROCHLORIDE 10 MILLIGRAM(S): 30 TABLET ORAL at 17:01

## 2025-03-21 RX ADMIN — Medication 40 MILLIGRAM(S): at 10:12

## 2025-03-21 RX ADMIN — OXYCODONE HYDROCHLORIDE 10 MILLIGRAM(S): 30 TABLET ORAL at 17:40

## 2025-03-21 RX ADMIN — Medication 650 MILLIGRAM(S): at 00:33

## 2025-03-21 RX ADMIN — Medication 650 MILLIGRAM(S): at 04:59

## 2025-03-21 RX ADMIN — INSULIN GLARGINE-YFGN 15 UNIT(S): 100 INJECTION, SOLUTION SUBCUTANEOUS at 08:29

## 2025-03-21 NOTE — PROGRESS NOTE ADULT - ASSESSMENT
69 yo WF presents with progressive back and R thigh pain. patient underwent lumbar lami/fusion L4-S1 about 1-1/2 yrs ago. Patient was doing well immediately post op but notes progressive c/o back and R thigh pain. Studies reveal adjacent level DDD with spinal stenosis and migration of the R L4 pedical screw thru the superior endplate of L4. Patient failed nonoperative modalities and underwent expl fusion, removal hardware, lami L3/4, laminotomy L2/3, fusion L3-S1, TLIF L3/4, cage, instrumentation, LBG, BMP on 3/18/25.    POD #3  Analgesia prn  Increase OOB/PT  Monitor JAMSHID  Monitor labs  Monitor U/O  Medical management  May transfer to 2N

## 2025-03-21 NOTE — PROGRESS NOTE ADULT - SUBJECTIVE AND OBJECTIVE BOX
HPI:  69 y/o F w/ pmh of DM2, HTN, HLD, anxiety, depression, CAD, chronic lower back pain, DHRUV, hx lumbar fusion presents for scheduled exploration fusion l4-s1 removal hardware on 3/18/25.     3/20/25: c/o nausea and epigastric pain not having BM yet       24 hour events: 3/21/25 pt seen and examined this morning, no complains at this time.    Review of Systems:  Constitutional: No fever, chills, fatigue  Neuro: No headache, numbness, weakness  Resp: No cough, wheezing, shortness of breath  CVS: No chest pain, palpitations, leg swelling  GI: No abdominal pain, nausea, vomiting, diarrhea   : No dysuria, frequency, incontinence  Skin: No itching, burning, rashes, or lesions   Msk: No joint pain or swelling  Psych: No depression, anxiety, mood swings    T(F): 98.1 (03-21-25 @ 08:50), Max: 99.2 (03-20-25 @ 12:41)  HR: 64 (03-21-25 @ 08:00) (64 - 87)  BP: 129/67 (03-21-25 @ 08:00) (129/67 - 166/74)  RR: 15 (03-21-25 @ 08:00) (12 - 21)  SpO2: 96% (03-21-25 @ 06:20) (90% - 97%)  Wt(kg): --      03-20-25 @ 07:01  -  03-21-25 @ 07:00  --------------------------------------------------------  IN: 375 mL / OUT: 1320 mL / NET: -945 mL        CAPILLARY BLOOD GLUCOSE      POCT Blood Glucose.: 229 mg/dL (21 Mar 2025 08:15)      I&O's Summary    20 Mar 2025 07:01  -  21 Mar 2025 07:00  --------------------------------------------------------  IN: 375 mL / OUT: 1320 mL / NET: -945 mL        Physical Exam:   Gen: Comfortable in bed in NAD  Neuro: AAOx3  HEENT: NC/AT  Resp: good air entry b/l  CVS: +RRR  Abd: BSx4, soft, nt/nd  Ext: no edema   Skin: warm/dry    Meds:    enalapril Oral  metoprolol succinate ER Oral    atorvastatin Oral  dextrose 50% Injectable IV Push  dextrose 50% Injectable IV Push  dextrose 50% Injectable IV Push  dextrose Oral Gel Oral PRN  fenofibrate Tablet Oral  glucagon  Injectable IntraMuscular  insulin glargine Injectable (LANTUS) SubCutaneous  insulin lispro (ADMELOG) corrective regimen sliding scale SubCutaneous      acetaminophen     Tablet .. Oral  acetaminophen   IVPB .. IV Intermittent  ALPRAZolam Oral PRN  celecoxib Oral  mirtazapine Oral  morphine  - Injectable IV Push PRN  ondansetron Injectable IV Push PRN  oxyCODONE    IR Oral PRN  oxyCODONE    IR Oral PRN  sertraline Oral        bisacodyl Oral PRN  pantoprazole    Tablet Oral  polyethylene glycol 3350 Oral  senna Oral      dextrose 5%. IV Continuous  dextrose 5%. IV Continuous  multivitamin Oral        naloxone Injectable IV Push PRN                            8.1    7.09  )-----------( 152      ( 21 Mar 2025 05:21 )             25.8       03-21    138  |  111[H]  |  30[H]  ----------------------------<  243[H]  4.0   |  25  |  0.99    Ca    9.1      21 Mar 2025 05:21  Phos  1.6     03-21  Mg     2.0     03-21      Urinalysis Basic - ( 21 Mar 2025 05:21 )    Color: x / Appearance: x / SG: x / pH: x  Gluc: 243 mg/dL / Ketone: x  / Bili: x / Urobili: x   Blood: x / Protein: x / Nitrite: x   Leuk Esterase: x / RBC: x / WBC x   Sq Epi: x / Non Sq Epi: x / Bacteria: x    Radiology:    < from: Xray Abdomen 1 View PORTABLE -Urgent (Xray Abdomen 1 View PORTABLE -Urgent .) (03.20.25 @ 10:03) >    ACC: [id number] EXAM:  XR ABDOMEN PORTABLE URGENT 1V   ORDERED BY: TRISTIN BO     PROCEDURE DATE:  03/20/2025          INTERPRETATION:  Supine abdominal study. 2 images. Patient has abdominal   pain.    Fairly extensive lower lumbar posterior hardware with multilevel lower   lumbar disc implants are noted.    This mildly increased fecal content in the colon but no sign of   obstruction.    There are bilateral catheters overlying the. Lumbar areas possibly drains.    IMPRESSION: No jr evidenceof free intraperitoneal air.    IMPRESSION: Lumbar surgery. No acute finding.    --- End of Report ---      ROXIE WEAVER MD; Attending Radiologist  This document has been electronically signed. Mar 20 2025  5:28PM    < end of copied text >        CODE STATUS: FULL CODE    Time spent on this patient encounter, which includes documenting this note in the electronic medical record, was 55 minutes including assessing the presenting problems with associated risk, reviewing the medical record to prepare for the encounter, and meeting face to face with patient to obtain additional history. I have also performed an appropriate physical exam, made interventions listed and ordered and interpreted appropriate diagnostic studies as documented. To improve communication and patient safety. I have coordinated care with the multidisciplinary team including the bedside nurse, appropriate attending of record and consultant as needed.

## 2025-03-21 NOTE — CONSULT NOTE ADULT - SUBJECTIVE AND OBJECTIVE BOX
Patient is a 70y old  Female who presents with a chief complaint of     BRIEF HOSPITAL COURSE: 69 yo female with PMHx DM2, HTN, HLD, anxiety, depression, CAD, chronic lower back pain, DHRUV, hx lumbar fusion presents for scheduled exploration fusion l4-s1 removal hardware on 3/18/25.     s/p laminectomy L3-L4 with osteotomy and fusion 3/18  EBL 600cc, received 250 cell saver    3/18 pt seen in sicu, c/o lower back pain still, denies numbness tingling in LEs. ROM in LE limited due to pain. denies chest pain, sob. abd pain. feels a little dizzy but suspect due to PCA pump    PAST MEDICAL & SURGICAL HISTORY:  Renal calculi  DM type 2 (diabetes mellitus, type 2)  HTN (hypertension)  Lower back pain  HLD (hyperlipidemia)  Anxiety and depression  Arthritis  Torn meniscus  Gastropresis  Diverticulosis  History of myocardial infarction  Kidney disease  History of hepatitis C  Morbid obesity  Hiatal hernia  GERD (gastroesophageal reflux disease)  Umbilical hernia  CAD (coronary artery disease)  Heart murmur  Insomnia  History of diverticulitis  Stage 3 chronic kidney disease  Marijuana use  Endometrial thickening on ultrasound  DHRUV (obstructive sleep apnea)  H/O peripheral neuropathy  History of lumbar laminectomy  History of lumbar fusion  History of 2  sections  History of colonoscopy  History of percutaneous angioplasty  H/O arthroscopy of left knee  S/P cardiac catheterization        Medications:  ceFAZolin  Injectable. 2000 milliGRAM(s) IV Push every 8 hours  enalapril 5 milliGRAM(s) Oral at bedtime  metoprolol succinate  milliGRAM(s) Oral daily  acetaminophen     Tablet .. 650 milliGRAM(s) Oral every 6 hours  ALPRAZolam 1 milliGRAM(s) Oral daily PRN  celecoxib 400 milliGRAM(s) Oral once  HYDROmorphone  Injectable 1 milliGRAM(s) IV Push every 3 hours PRN  HYDROmorphone PCA (1 mG/mL) 30 milliLiter(s) PCA Continuous PCA Continuous  HYDROmorphone PCA (1 mG/mL) Rescue Clinician Bolus 0.5 milliGRAM(s) IV Push every 15 minutes PRN  mirtazapine 15 milliGRAM(s) Oral at bedtime  oxyCODONE    IR 5 milliGRAM(s) Oral every 3 hours PRN  sertraline 250 milliGRAM(s) Oral at bedtime  tranexamic acid IVPB 2000 milliGRAM(s) IV Intermittent once  tranexamic acid IVPB 2000 milliGRAM(s) IV Intermittent once  bisacodyl 5 milliGRAM(s) Oral every 12 hours PRN  senna 2 Tablet(s) Oral at bedtime  atorvastatin 40 milliGRAM(s) Oral at bedtime  fenofibrate Tablet 145 milliGRAM(s) Oral daily  metFORMIN 500 milliGRAM(s) Oral two times a da  multivitamin 1 Tablet(s) Oral daily  sodium chloride 0.9%. 1000 milliLiter(s) IV Continuous <Continuous>  naloxone Injectable 0.1 milliGRAM(s) IV Push every 3 minutes PRN      ICU Vital Signs Last 24 Hrs  T(C): 36.6 (18 Mar 2025 16:00), Max: 36.6 (18 Mar 2025 14:15)  T(F): 97.9 (18 Mar 2025 16:00), Max: 97.9 (18 Mar 2025 16:00)  HR: 72 (18 Mar 2025 16:00) (70 - 78)  BP: 143/60 (18 Mar 2025 16:00) (115/59 - 158/81)  BP(mean): 82 (18 Mar 2025 16:00) (82 - 82)  ABP: 136/64 (18 Mar 2025 16:00) (135/61 - 165/77)  ABP(mean): 90 (18 Mar 2025 16:00) (90 - 90)  RR: 10 (18 Mar 2025 16:00) (10 - 16)  SpO2: 100% (18 Mar 2025 16:00) (95% - 100%)    O2 Parameters below as of 18 Mar 2025 16:00  Patient On (Oxygen Delivery Method): nasal cannula  O2 Flow (L/min): 2    ABG - ( 18 Mar 2025 12:42 )  pH, Arterial: 7.30  pH, Blood: x     /  pCO2: 29    /  pO2: 271   / HCO3: 14    / Base Excess: -10.7 /  SaO2: 99.0        I&O's Detail    18 Mar 2025 07:01  -  18 Mar 2025 18:09  --------------------------------------------------------  IN:    Other (mL): 3950 mL    sodium chloride 0.9%: 25 mL  Total IN: 3975 mL    OUT:    Blood Loss (mL): 600 mL    Bulb (mL): 70 mL    Indwelling Catheter - Urethral (mL): 90 mL    Other (mL): 480 mL  Total OUT: 1240 mL    Total NET: 2735 mL    LABS:                        10.2   8.33  )-----------( 199      ( 18 Mar 2025 14:40 )             32.6     03-18    140  |  111[H]  |  27[H]  ----------------------------<  211[H]  4.4   |  25  |  1.20    Ca    8.1[L]      18 Mar 2025 14:40    TPro  6.8  /  Alb  3.4  /  TBili  0.3  /  DBili  x   /  AST  10[L]  /  ALT  23  /  AlkPhos  39[L]  03-17    CAPILLARY BLOOD GLUCOSE  POCT Blood Glucose.: 184 mg/dL (18 Mar 2025 14:24)    PT/INR - ( 17 Mar 2025 08:17 )   PT: 10.0 sec;   INR: 0.87 ratio    PTT - ( 17 Mar 2025 08:17 )  PTT:29.3 sec  Urinalysis Basic - ( 18 Mar 2025 14:40 )    Color: x / Appearance: x / SG: x / pH: x  Gluc: 211 mg/dL / Ketone: x  / Bili: x / Urobili: x   Blood: x / Protein: x / Nitrite: x   Leuk Esterase: x / RBC: x / WBC x   Sq Epi: x / Non Sq Epi: x / Bacteria: x    CULTURES:    Physical Examination:    General: No acute distress.    HEENT: Pupils equal, reactive to light.  Symmetric. face appears swollen, swollen eye lids and cheeks suspect related to positioning during surgery. pt denies visual changes, and didnt notice that her face was swolllen  PULM: Clear to auscultation bilaterally, no wheezing  CVS: Regular rate and rhythm, no murmurs  ABD: Soft, nondistended, nontender  EXT: No LE edema b/l  SKIN: Warm   NEURO: Alert, oriented, interactive, LE weak b/l. able to only slightly lift legs off the bed, limited due to back pain, sensation to light touch in LE intact b/l. pt had slurred speech initially, then given some water. speech improved, she also said shes missing her dentures so it affects her speech    DEVICES:   liban TORO radial A line  JAMSHID x 2 - old blood    RADIOLOGY:   
69 yo WF presents with progressive back and R thigh pain. patient underwent lumbar lami/fusion L4-S1 about 1-1/2 yrs ago. Patient was doing well immediately post op but notes progressive c/o back and R thigh pain. Studies reveal adjacent level DDD with spinal stenosis and migration of the R L4 pedical screw thru the superior endplate of L4. Patient failed nonoperative modalities and underwent expl fusion, removal hardware, lami L3/4, laminotomy L2/3, fusion L3-S1, TLIF L3/4, cage, instrumentation, LBG, BMP on 3/18/25. Initially managed in ICU by ICU team and now transferred to hospitalist service     Review of Systems:  CONSTITUTIONAL: as per hpi   EYES/ENT: No visual changes;  No vertigo or throat pain   NECK: No pain or stiffness  RESPIRATORY: No cough, wheezing, hemoptysis; No shortness of breath,   CARDIOVASCULAR: No chest pain or palpitations  GASTROINTESTINAL: No abdominal or epigastric pain. No nausea, vomiting, or hematemesis; No diarrhea or constipation.   GENITOURINARY: No dysuria, frequency or hematuria  NEUROLOGICAL: As per hpi     SKIN: No itching, burning, rashes, or lesions   All other review of systems is negative unless indicated above    PAST MEDICAL & SURGICAL HISTORY:  Renal calculi      DM type 2 (diabetes mellitus, type 2)      HTN (hypertension)      Lower back pain      HLD (hyperlipidemia)      Anxiety and depression      Arthritis      Torn meniscus      Gastroparesis      Diverticulosis      History of myocardial infarction      Kidney disease      History of hepatitis C      Morbid obesity      Hiatal hernia      GERD (gastroesophageal reflux disease)      Umbilical hernia      CAD (coronary artery disease)      Heart murmur      Insomnia      History of diverticulitis      Stage 3 chronic kidney disease      Marijuana use      Endometrial thickening on ultrasound      DHRUV (obstructive sleep apnea)      H/O peripheral neuropathy      History of lumbar laminectomy      History of lumbar fusion      History of 2  sections      History of colonoscopy      History of percutaneous angioplasty      H/O arthroscopy of left knee      S/P cardiac catheterization        FAMILY HISTORY:  Family history of DVT (Father)    FH: kidney disease (Mother)      Social history -no smoking , no drinking alcohol, no recreational drug abuse     Vital Signs Last 24 Hrs  T(C): 36.7 (21 Mar 2025 08:50), Max: 37.3 (20 Mar 2025 12:41)  T(F): 98.1 (21 Mar 2025 08:50), Max: 99.2 (20 Mar 2025 12:41)  HR: 68 (21 Mar 2025 10:25) (63 - 87)  BP: 165/68 (21 Mar 2025 10:25) (129/67 - 166/74)  BP(mean): 95 (21 Mar 2025 10:25) (83 - 104)  RR: 13 (21 Mar 2025 10:25) (12 - 21)  SpO2: 96% (21 Mar 2025 10:25) (93% - 100%)    Parameters below as of 21 Mar 2025 06:20  Patient On (Oxygen Delivery Method): nasal cannula  O2 Flow (L/min): 2      General: comfortable on bed   Head- Atraumatic, normocephalic   Neurology: A&Ox3,  HEENT- PERRLA, moist muscous membrane  Neck-supple, no JVD  Respiratory: Air entry equal b/l, CTA   CVS:  S1S2, no murmurs, rubs or gallops  Abdominal: Soft, NT, ND +BS,   Genitourinary- voiding, non palpable bladder  Extremities: No edema, + peripheral pulses  Skin- no rash, no ulcer  Psychiatric- mood stable   LN- no lymphadenopathy                             8.1    7.09  )-----------( 152      ( 21 Mar 2025 05:21 )             25.8     03-21    138  |  111[H]  |  30[H]  ----------------------------<  243[H]  4.0   |  25  |  0.99    Ca    9.1      21 Mar 2025 05:21  Phos  1.6     03-21  Mg     2.0     03-21            Urinalysis Basic - ( 21 Mar 2025 05:21 )    Color: x / Appearance: x / SG: x / pH: x  Gluc: 243 mg/dL / Ketone: x  / Bili: x / Urobili: x   Blood: x / Protein: x / Nitrite: x   Leuk Esterase: x / RBC: x / WBC x   Sq Epi: x / Non Sq Epi: x / Bacteria: x          CAPILLARY BLOOD GLUCOSE      POCT Blood Glucose.: 229 mg/dL (21 Mar 2025 08:15)      MEDICATIONS  (STANDING):  acetaminophen     Tablet .. 650 milliGRAM(s) Oral every 6 hours  acetaminophen   IVPB .. 1000 milliGRAM(s) IV Intermittent once  atorvastatin 40 milliGRAM(s) Oral at bedtime  celecoxib 200 milliGRAM(s) Oral daily  dextrose 5%. 1000 milliLiter(s) (50 mL/Hr) IV Continuous <Continuous>  dextrose 5%. 1000 milliLiter(s) (100 mL/Hr) IV Continuous <Continuous>  dextrose 50% Injectable 25 Gram(s) IV Push once  dextrose 50% Injectable 12.5 Gram(s) IV Push once  dextrose 50% Injectable 25 Gram(s) IV Push once  enalapril 5 milliGRAM(s) Oral at bedtime  fenofibrate Tablet 145 milliGRAM(s) Oral daily  glucagon  Injectable 1 milliGRAM(s) IntraMuscular once  insulin glargine Injectable (LANTUS) 15 Unit(s) SubCutaneous every morning  insulin lispro (ADMELOG) corrective regimen sliding scale   SubCutaneous Before meals and at bedtime  metoprolol succinate  milliGRAM(s) Oral daily  mirtazapine 15 milliGRAM(s) Oral at bedtime  multivitamin 1 Tablet(s) Oral daily  pantoprazole    Tablet 40 milliGRAM(s) Oral daily  polyethylene glycol 3350 17 Gram(s) Oral daily  potassium phosphate / sodium phosphate Powder (PHOS-NaK) 1 Packet(s) Oral three times a day  senna 2 Tablet(s) Oral at bedtime  sertraline 250 milliGRAM(s) Oral at bedtime    MEDICATIONS  (PRN):  ALPRAZolam 0.5 milliGRAM(s) Oral daily PRN anxiety  bisacodyl 5 milliGRAM(s) Oral every 12 hours PRN Constipation  dextrose Oral Gel 15 Gram(s) Oral once PRN Blood Glucose LESS THAN 70 milliGRAM(s)/deciliter  morphine  - Injectable 2 milliGRAM(s) IV Push every 4 hours PRN breakthrough pain  naloxone Injectable 0.1 milliGRAM(s) IV Push every 3 minutes PRN For ANY of the following changes in patient status:  A. RR LESS THAN 10 breaths per minute, B. Oxygen saturation LESS THAN 90%, C. Sedation score of 6  ondansetron Injectable 4 milliGRAM(s) IV Push every 8 hours PRN Nausea and/or Vomiting  oxyCODONE    IR 5 milliGRAM(s) Oral every 4 hours PRN Moderate Pain (4 - 6)  oxyCODONE    IR 10 milliGRAM(s) Oral every 4 hours PRN Severe Pain (7 - 10)

## 2025-03-21 NOTE — CONSULT NOTE ADULT - ASSESSMENT
#s/p removal hardware, lami L3/4, laminotomy L2/3, fusion L3-S1, TLIF L3/4, cage, instrumentation  -pain control, out of bed to chair   -res of management as per surgical team   -bowel care     #HTN-enalpril, metoprolol  -monitor it     #DM- ct insuline, adjust as per reading     #HLD- Fenofibrate, Lipitor     #CAD-ct fenofibrate, lipitor, metoprolol, not on ASA probably due to surgical reason for now    #Anxiety -alprazolam     #hypophosphatemia- replace and monitor it     #Depression -remeron    #dvt pr -scd     #discussed with pt, icu team, surgical tea, nursing staff
ASSESSMENT  71 yo female with PMHx DM2, HTN, HLD, anxiety, depression, CAD, chronic lower back pain, DHRUV, hx lumbar fusion presents for scheduled exploration fusion l4-s1 removal hardware on 3/18/25.     s/p laminectomy L3-L4 with osteotomy and fusion 3/18  EBL 600cc, received 250 cell saver    PLAN   - mentation intact. pain control prn dilaudid PCA, IV tylenol, cont sertraline, mirtazapine.   - BP mildly elevated suspect due to pain 130s/80s. R radial A line in place. in sinus rhythm. cont home dose toprol 100mg qd, enalapril 5mg, statin, fenofibrate  - on 2L nc sating 96-97%. encourage incentive spirometer  - adv diet as per surgery team. PPI. bowel regimen prn  - lower IV fluids to LR @75. louie in place  monitor I & Os   - a1c 8.1. hold metformin, PO DM meds. ISS. BGMs ac hs.   - JAMSHID drain x2  mgmt as per sx. monitor output - scant sanguinous   - post op abx IV ancef. monitor WBC and temps  - Hgb 10.2 post op (was 9.4 pre -op). no chemical DVT ppx d/t bleeding risk , recent surgery. cont SCDs  PT eval     Dispo: SICU. pain control. monitor JAMSHID output. IV fluids. PT    Will discuss with Dr. No

## 2025-03-21 NOTE — PROGRESS NOTE ADULT - ASSESSMENT
71 y/o F w/ pmh of DM2, HTN, HLD, anxiety, depression, CAD, chronic lower back pain, DHRUV, hx lumbar fusion presents for scheduled exploration fusion l4-s1 removal hardware on 3/18/25.     -Neuro: No acute issues, post-op pain control w/ tylenol, celecoxib, oxycodone, morphine per painscale, depression on remeron/zolof, anxiety prn w/ xanax  -Cardiac: HDS maintain MAP >65, htn on enalapril/lopressor  -Resp: No acute issues, o2 stable on NC  -GI: cont diet as ordered/tolerated, GI ppx w/ protonix, cont bowel regimen for constipation abd xray w/ no acute finding  -Renal: Renal function stable cont to monitor along w/ lytes  -ID: No acute infectious process suspected, monitor off IVAB  -Endo: No acute issues, DM2 on ISS/lantus  -Heme: DVT ppx w/ SCD will defer to surgical team when they are ok with starting chemical AC, Anemia likely post-op can cont to monitor for now  -Dispo: Pt remains in the SICU, start OOB/PT, will d/w ortho team to see if if they are agreeable to transfer to floor, case d/w dr christine 69 y/o F w/ pmh of DM2, HTN, HLD, anxiety, depression, CAD, chronic lower back pain, DHRUV, hx lumbar fusion presents for scheduled exploration fusion l4-s1 removal hardware on 3/18/25.     -Neuro: No acute issues, post-op pain control w/ tylenol, celecoxib, oxycodone, morphine per painscale, depression on remeron/zoloft, anxiety prn w/ xanax  -Cardiac: HDS maintain MAP >65, htn on enalapril/lopressor  -Resp: No acute issues, o2 stable on NC  -GI: cont diet as ordered/tolerated, GI ppx w/ protonix, cont bowel regimen for constipation abd xray w/ no acute finding  -Renal: Renal function stable cont to monitor along w/ lytes  -ID: No acute infectious process suspected, monitor off IVAB  -Endo: No acute issues, DM2 on ISS/lantus  -Heme: DVT ppx w/ SCD will defer to surgical team when they are ok with starting chemical AC, Anemia likely post-op can cont to monitor for now  -Dispo: Pt remains in the SICU, start OOB/PT, will d/w ortho team to see if if they are agreeable to transfer to floor, case d/w dr christine

## 2025-03-21 NOTE — PROGRESS NOTE ADULT - NS ATTEND AMEND GEN_ALL_CORE FT
agree with assessment and plan as above

## 2025-03-21 NOTE — CHART NOTE - NSCHARTNOTEFT_GEN_A_CORE
d/w ortho PA, ok with transfer to the floor, transfer ordered placed d/w ortho PA, ok with transfer to the floor, transfer ordered placed, pt signed out to medical team dr. alves for medical managmeent

## 2025-03-21 NOTE — CONSULT NOTE ADULT - TIME BILLING
I spent a total of 75  minutes in face-to-face time with the patient and on the floor managing patient including coordination of care. Overt 50% of the time spent in discussion of the diagnosis, counseling and treatment plan.
.

## 2025-03-21 NOTE — PROGRESS NOTE ADULT - SUBJECTIVE AND OBJECTIVE BOX
69 yo WF presents with progressive back and R thigh pain. patient underwent lumbar lami/fusion L4-S1 about 1-1/2 yrs ago. Patient was doing well immediately post op but notes progressive c/o back and R thigh pain. Studies reveal adjacent level DDD with spinal stenosis and migration of the R L4 pedical screw thru the superior endplate of L4. Patient failed nonoperative modalities and underwent expl fusion, removal hardware, lami L3/4, laminotomy L2/3, fusion L3-S1, TLIF L3/4, cage, instrumentation, LBG, BMP on 3/18/25.    3/19/25 Patient with incisional pain but no thigh pain  3/20/25 Patient c/o incisional and abdominal pain today  3/21/25 Patient comfortable. no abdominal pain    PAST MEDICAL & SURGICAL HISTORY:  Renal calculi      DM type 2 (diabetes mellitus, type 2)      HTN (hypertension)      Lower back pain      HLD (hyperlipidemia)      Anxiety and depression      Arthritis      Torn meniscus      Gastroparesis      Diverticulosis      History of myocardial infarction      Kidney disease      History of hepatitis C      Morbid obesity      Hiatal hernia      GERD (gastroesophageal reflux disease)      Umbilical hernia      CAD (coronary artery disease)      Heart murmur      Insomnia      History of diverticulitis      Stage 3 chronic kidney disease      Marijuana use      Endometrial thickening on ultrasound      DHRUV (obstructive sleep apnea)      H/O peripheral neuropathy      History of lumbar laminectomy      History of lumbar fusion      History of 2  sections      History of colonoscopy      History of percutaneous angioplasty      H/O arthroscopy of left knee      S/P cardiac catheterization    MEDICATIONS  (STANDING):  acetaminophen     Tablet .. 650 milliGRAM(s) Oral every 6 hours  acetaminophen   IVPB .. 1000 milliGRAM(s) IV Intermittent once  atorvastatin 40 milliGRAM(s) Oral at bedtime  celecoxib 200 milliGRAM(s) Oral daily  dextrose 5%. 1000 milliLiter(s) (50 mL/Hr) IV Continuous <Continuous>  dextrose 5%. 1000 milliLiter(s) (100 mL/Hr) IV Continuous <Continuous>  dextrose 50% Injectable 25 Gram(s) IV Push once  dextrose 50% Injectable 12.5 Gram(s) IV Push once  dextrose 50% Injectable 25 Gram(s) IV Push once  enalapril 5 milliGRAM(s) Oral at bedtime  fenofibrate Tablet 145 milliGRAM(s) Oral daily  glucagon  Injectable 1 milliGRAM(s) IntraMuscular once  insulin glargine Injectable (LANTUS) 15 Unit(s) SubCutaneous every morning  insulin lispro (ADMELOG) corrective regimen sliding scale   SubCutaneous Before meals and at bedtime  metoprolol succinate  milliGRAM(s) Oral daily  mirtazapine 15 milliGRAM(s) Oral at bedtime  multivitamin 1 Tablet(s) Oral daily  pantoprazole    Tablet 40 milliGRAM(s) Oral daily  polyethylene glycol 3350 17 Gram(s) Oral daily  senna 2 Tablet(s) Oral at bedtime  sertraline 250 milliGRAM(s) Oral at bedtime    MEDICATIONS  (PRN):  ALPRAZolam 1 milliGRAM(s) Oral daily PRN for anxiety  bisacodyl 5 milliGRAM(s) Oral every 12 hours PRN Constipation  dextrose Oral Gel 15 Gram(s) Oral once PRN Blood Glucose LESS THAN 70 milliGRAM(s)/deciliter  HYDROmorphone PCA (1 mG/mL) Rescue Clinician Bolus 0.5 milliGRAM(s) IV Push every 15 minutes PRN for Pain Scale GREATER THAN 6  naloxone Injectable 0.1 milliGRAM(s) IV Push every 3 minutes PRN For ANY of the following changes in patient status:  A. RR LESS THAN 10 breaths per minute, B. Oxygen saturation LESS THAN 90%, C. Sedation score of 6    Allergies    strawberry (Other (Moderate))  No Known Drug Allergies  copper (Hives)    Intolerances    morphine (Other (Mild))  codeine (Other (Mild))    PE:    ICU Vital Signs Last 24 Hrs  T(C): 36.7 (21 Mar 2025 08:50), Max: 37.3 (20 Mar 2025 12:41)  T(F): 98.1 (21 Mar 2025 08:50), Max: 99.2 (20 Mar 2025 12:41)  HR: 63 (21 Mar 2025 09:00) (63 - 87)  BP: 146/87 (21 Mar 2025 09:00) (129/67 - 166/74)  BP(mean): 98 (21 Mar 2025 09:00) (83 - 104)  ABP: --  ABP(mean): --  RR: 21 (21 Mar 2025 09:00) (12 - 21)  SpO2: 100% (21 Mar 2025 08:50) (90% - 100%)    O2 Parameters below as of 21 Mar 2025 06:20  Patient On (Oxygen Delivery Method): nasal cannula  O2 Flow (L/min): 2    Patient laying flat in bed with c/omild  incisional pain  Tolerating diet-(+) flatus/no BM  Voiding with adequate U/O  Dressing changed-incision clean and dry  Deep JAMSHID with 120 cc last 24hr/70 last 12 hrs  Superficial JAMSHID with scant drainage-removed  Neuro without deficits    LABS                          8.1    7.09  )-----------( 152      ( 21 Mar 2025 05:21 )             25.8         03-21    138  |  111[H]  |  30[H]  ----------------------------<  243[H]  4.0   |  25  |  0.99    Ca    9.1      21 Mar 2025 05:21  Phos  1.6       Mg     2.0     21

## 2025-03-22 DIAGNOSIS — M96.1 POSTLAMINECTOMY SYNDROME, NOT ELSEWHERE CLASSIFIED: ICD-10-CM

## 2025-03-22 LAB
ALBUMIN SERPL ELPH-MCNC: 2.4 G/DL — LOW (ref 3.3–5)
ALP SERPL-CCNC: 36 U/L — LOW (ref 40–120)
ALT FLD-CCNC: 12 U/L — SIGNIFICANT CHANGE UP (ref 12–78)
ANION GAP SERPL CALC-SCNC: 4 MMOL/L — LOW (ref 5–17)
AST SERPL-CCNC: 16 U/L — SIGNIFICANT CHANGE UP (ref 15–37)
BILIRUB SERPL-MCNC: 0.4 MG/DL — SIGNIFICANT CHANGE UP (ref 0.2–1.2)
BUN SERPL-MCNC: 28 MG/DL — HIGH (ref 7–23)
CALCIUM SERPL-MCNC: 8.7 MG/DL — SIGNIFICANT CHANGE UP (ref 8.5–10.1)
CHLORIDE SERPL-SCNC: 113 MMOL/L — HIGH (ref 96–108)
CO2 SERPL-SCNC: 26 MMOL/L — SIGNIFICANT CHANGE UP (ref 22–31)
CREAT SERPL-MCNC: 0.99 MG/DL — SIGNIFICANT CHANGE UP (ref 0.5–1.3)
EGFR: 61 ML/MIN/1.73M2 — SIGNIFICANT CHANGE UP
EGFR: 61 ML/MIN/1.73M2 — SIGNIFICANT CHANGE UP
GLUCOSE BLDC GLUCOMTR-MCNC: 221 MG/DL — HIGH (ref 70–99)
GLUCOSE BLDC GLUCOMTR-MCNC: 248 MG/DL — HIGH (ref 70–99)
GLUCOSE BLDC GLUCOMTR-MCNC: 250 MG/DL — HIGH (ref 70–99)
GLUCOSE BLDC GLUCOMTR-MCNC: 260 MG/DL — HIGH (ref 70–99)
GLUCOSE SERPL-MCNC: 211 MG/DL — HIGH (ref 70–99)
HCT VFR BLD CALC: 24.2 % — LOW (ref 34.5–45)
HGB BLD-MCNC: 7.6 G/DL — LOW (ref 11.5–15.5)
MAGNESIUM SERPL-MCNC: 1.8 MG/DL — SIGNIFICANT CHANGE UP (ref 1.6–2.6)
MCHC RBC-ENTMCNC: 27.8 PG — SIGNIFICANT CHANGE UP (ref 27–34)
MCHC RBC-ENTMCNC: 31.4 G/DL — LOW (ref 32–36)
MCV RBC AUTO: 88.6 FL — SIGNIFICANT CHANGE UP (ref 80–100)
NRBC # BLD AUTO: 0 K/UL — SIGNIFICANT CHANGE UP (ref 0–0)
NRBC # FLD: 0 K/UL — SIGNIFICANT CHANGE UP (ref 0–0)
NRBC BLD AUTO-RTO: 0 /100 WBCS — SIGNIFICANT CHANGE UP (ref 0–0)
PHOSPHATE SERPL-MCNC: 2.1 MG/DL — LOW (ref 2.5–4.5)
PLATELET # BLD AUTO: 170 K/UL — SIGNIFICANT CHANGE UP (ref 150–400)
PMV BLD: 11 FL — SIGNIFICANT CHANGE UP (ref 7–13)
POTASSIUM SERPL-MCNC: 4.2 MMOL/L — SIGNIFICANT CHANGE UP (ref 3.5–5.3)
POTASSIUM SERPL-SCNC: 4.2 MMOL/L — SIGNIFICANT CHANGE UP (ref 3.5–5.3)
PROT SERPL-MCNC: 6.1 GM/DL — SIGNIFICANT CHANGE UP (ref 6–8.3)
RBC # BLD: 2.73 M/UL — LOW (ref 3.8–5.2)
RBC # FLD: 15.3 % — HIGH (ref 10.3–14.5)
SODIUM SERPL-SCNC: 143 MMOL/L — SIGNIFICANT CHANGE UP (ref 135–145)
WBC # BLD: 5.88 K/UL — SIGNIFICANT CHANGE UP (ref 3.8–10.5)
WBC # FLD AUTO: 5.88 K/UL — SIGNIFICANT CHANGE UP (ref 3.8–10.5)

## 2025-03-22 PROCEDURE — 99232 SBSQ HOSP IP/OBS MODERATE 35: CPT

## 2025-03-22 RX ADMIN — OXYCODONE HYDROCHLORIDE 10 MILLIGRAM(S): 30 TABLET ORAL at 22:20

## 2025-03-22 RX ADMIN — SERTRALINE 250 MILLIGRAM(S): 100 TABLET, FILM COATED ORAL at 22:20

## 2025-03-22 RX ADMIN — ATORVASTATIN CALCIUM 40 MILLIGRAM(S): 80 TABLET, FILM COATED ORAL at 22:20

## 2025-03-22 RX ADMIN — Medication 650 MILLIGRAM(S): at 04:55

## 2025-03-22 RX ADMIN — Medication 650 MILLIGRAM(S): at 17:50

## 2025-03-22 RX ADMIN — CELECOXIB 200 MILLIGRAM(S): 50 CAPSULE ORAL at 09:59

## 2025-03-22 RX ADMIN — INSULIN GLARGINE-YFGN 15 UNIT(S): 100 INJECTION, SOLUTION SUBCUTANEOUS at 08:41

## 2025-03-22 RX ADMIN — INSULIN LISPRO 3: 100 INJECTION, SOLUTION INTRAVENOUS; SUBCUTANEOUS at 22:21

## 2025-03-22 RX ADMIN — Medication 40 MILLIGRAM(S): at 09:58

## 2025-03-22 RX ADMIN — MIRTAZAPINE 15 MILLIGRAM(S): 30 TABLET, FILM COATED ORAL at 22:20

## 2025-03-22 RX ADMIN — METOPROLOL SUCCINATE 100 MILLIGRAM(S): 50 TABLET, EXTENDED RELEASE ORAL at 09:58

## 2025-03-22 RX ADMIN — INSULIN LISPRO 2: 100 INJECTION, SOLUTION INTRAVENOUS; SUBCUTANEOUS at 17:51

## 2025-03-22 RX ADMIN — FENOFIBRATE 145 MILLIGRAM(S): 160 TABLET ORAL at 09:59

## 2025-03-22 RX ADMIN — Medication 5 MILLIGRAM(S): at 22:20

## 2025-03-22 RX ADMIN — Medication 650 MILLIGRAM(S): at 13:55

## 2025-03-22 RX ADMIN — INSULIN LISPRO 2: 100 INJECTION, SOLUTION INTRAVENOUS; SUBCUTANEOUS at 08:41

## 2025-03-22 RX ADMIN — INSULIN LISPRO 2: 100 INJECTION, SOLUTION INTRAVENOUS; SUBCUTANEOUS at 11:44

## 2025-03-22 RX ADMIN — OXYCODONE HYDROCHLORIDE 10 MILLIGRAM(S): 30 TABLET ORAL at 22:50

## 2025-03-22 RX ADMIN — Medication 650 MILLIGRAM(S): at 04:33

## 2025-03-22 NOTE — PROGRESS NOTE ADULT - ASSESSMENT
#s/p removal hardware, lami L3/4, laminotomy L2/3, fusion L3-S1, TLIF L3/4, cage, instrumentation  -pain control, out of bed to chair   -res of management as per surgical team   -bowel care     #Anemia of Blood Loss  -monitor CBC   -Keep Hgb Above 7    #HTN-enalpril, metoprolol  -monitor it     #DM- ct insuline, adjust as per reading     #HLD- Fenofibrate, Lipitor     #CAD-ct fenofibrate, lipitor, metoprolol, not on ASA probably due to surgical reason for now    #Anxiety -alprazolam     #hypophosphatemia- replace and monitor it     #Depression -remeron    #dvt pr -scd

## 2025-03-22 NOTE — PROGRESS NOTE ADULT - SUBJECTIVE AND OBJECTIVE BOX
HOSPITALIST PROGRESS NOTE:  SUBJECTIVE:  PCP:  Chief Complaint: Patient is a 70y old  Female who presents with a chief complaint of back and leg pain difficulty walking (22 Mar 2025 12:23)      HPI:  69 yo WF presents with progressive back and R thigh pain. patient underwent lumbar lami/fusion L4-S1 about 1-1/2 yrs ago. Patient was doing well immediately post op but notes progressive c/o back and R thigh pain. Studies reveal adjacent level DDD with spinal stenosis and migration of the R L4 pedical screw thru the superior endplate of L4. Patient failed nonoperative modalities and underwent expl fusion, removal hardware, lami L3/4, laminotomy L2/3, fusion L3-S1, TLIF L3/4, cage, instrumentation, LBG, BMP on 3/18/25. Initially managed in ICU by ICU team and now transferred to hospitalist service     3/22: Above reviewed; patient has no complaints. Np CP, dyspnea, Palpitations. n/vd    Allergies:  strawberry (Other (Moderate))  No Known Drug Allergies  morphine (Other (Mild))  copper (Hives)  codeine (Other (Mild))    REVIEW OF SYSTEMS:  See HPI. All other review of systems is negative unless indicated above.     OBJECTIVE  Physical Exam:  Vital Signs:    Vital Signs Last 24 Hrs  T(C): 36.4 (22 Mar 2025 08:01), Max: 37.1 (22 Mar 2025 04:00)  T(F): 97.5 (22 Mar 2025 08:01), Max: 98.7 (22 Mar 2025 04:00)  HR: 67 (22 Mar 2025 08:01) (65 - 83)  BP: 164/81 (22 Mar 2025 08:01) (154/68 - 164/81)  BP(mean): --  RR: 17 (22 Mar 2025 08:01) (16 - 17)  SpO2: 98% (22 Mar 2025 08:01) (92% - 98%)    Parameters below as of 22 Mar 2025 08:01  Patient On (Oxygen Delivery Method): room air      I&O's Summary    21 Mar 2025 07:01  -  22 Mar 2025 07:00  --------------------------------------------------------  IN: 500 mL / OUT: 60 mL / NET: 440 mL    Constitutional: NAD, awake and alert  Neurological: AAO x 3, no focal deficits  HEENT: PERRLA, EOMI, MMM  Neck: Soft and supple, No LAD, No JVD  Respiratory: Breath sounds are clear bilaterally, No wheezing, rales or rhonchi  Cardiovascular: S1 and S2, regular rate and rhythm; no Murmurs, gallops or rubs  Gastrointestinal: Bowel Sounds present, soft, nontender, nondistended, no guarding, no rebound tenderness  Back: No CVA tenderness   Extremities: No peripheral edema  Vascular: 2+ peripheral pulses  Musculoskeletal: 5/5 strength b/l upper and lower extremities  Skin: No rashes  Breast: Deferred  Rectal: Deferred    MEDICATIONS  (STANDING):  acetaminophen     Tablet .. 650 milliGRAM(s) Oral every 6 hours  acetaminophen   IVPB .. 1000 milliGRAM(s) IV Intermittent once  atorvastatin 40 milliGRAM(s) Oral at bedtime  celecoxib 200 milliGRAM(s) Oral daily  dextrose 5%. 1000 milliLiter(s) (50 mL/Hr) IV Continuous <Continuous>  dextrose 5%. 1000 milliLiter(s) (100 mL/Hr) IV Continuous <Continuous>  dextrose 50% Injectable 25 Gram(s) IV Push once  dextrose 50% Injectable 12.5 Gram(s) IV Push once  dextrose 50% Injectable 25 Gram(s) IV Push once  enalapril 5 milliGRAM(s) Oral at bedtime  fenofibrate Tablet 145 milliGRAM(s) Oral daily  glucagon  Injectable 1 milliGRAM(s) IntraMuscular once  insulin glargine Injectable (LANTUS) 15 Unit(s) SubCutaneous every morning  insulin lispro (ADMELOG) corrective regimen sliding scale   SubCutaneous Before meals and at bedtime  metoprolol succinate  milliGRAM(s) Oral daily  mirtazapine 15 milliGRAM(s) Oral at bedtime  multivitamin 1 Tablet(s) Oral daily  pantoprazole    Tablet 40 milliGRAM(s) Oral daily  polyethylene glycol 3350 17 Gram(s) Oral daily  senna 2 Tablet(s) Oral at bedtime  sertraline 250 milliGRAM(s) Oral at bedtime      LABS: All Labs Reviewed:                        7.6    5.88  )-----------( 170      ( 22 Mar 2025 07:51 )             24.2     03-22    143  |  113[H]  |  28[H]  ----------------------------<  211[H]  4.2   |  26  |  0.99    Ca    8.7      22 Mar 2025 07:51  Phos  2.1     03-22  Mg     1.8     03-22    TPro  6.1  /  Alb  2.4[L]  /  TBili  0.4  /  DBili  x   /  AST  16  /  ALT  12  /  AlkPhos  36[L]  03-22      Urinalysis Basic - ( 22 Mar 2025 07:51 )    Color: x / Appearance: x / SG: x / pH: x  Gluc: 211 mg/dL / Ketone: x  / Bili: x / Urobili: x   Blood: x / Protein: x / Nitrite: x   Leuk Esterase: x / RBC: x / WBC x   Sq Epi: x / Non Sq Epi: x / Bacteria: x      RADIOLOGY/EKG:    < from: Xray Abdomen 1 View PORTABLE -Urgent (Xray Abdomen 1 View PORTABLE -Urgent .) (03.20.25 @ 10:03) >    IMPRESSION: No jr evidenceof free intraperitoneal air.    IMPRESSION: Lumbar surgery. No acute finding.    < end of copied text >

## 2025-03-22 NOTE — PROGRESS NOTE ADULT - SUBJECTIVE AND OBJECTIVE BOX
comfortable  feels better than before surgery with back and legs  afeb   vss   wound ok  drain 10 cc last shift  removed  oob walking  7.6/24.2

## 2025-03-23 LAB
ANION GAP SERPL CALC-SCNC: 4 MMOL/L — LOW (ref 5–17)
BUN SERPL-MCNC: 18 MG/DL — SIGNIFICANT CHANGE UP (ref 7–23)
CALCIUM SERPL-MCNC: 9.2 MG/DL — SIGNIFICANT CHANGE UP (ref 8.5–10.1)
CHLORIDE SERPL-SCNC: 110 MMOL/L — HIGH (ref 96–108)
CO2 SERPL-SCNC: 27 MMOL/L — SIGNIFICANT CHANGE UP (ref 22–31)
CREAT SERPL-MCNC: 0.84 MG/DL — SIGNIFICANT CHANGE UP (ref 0.5–1.3)
EGFR: 74 ML/MIN/1.73M2 — SIGNIFICANT CHANGE UP
EGFR: 74 ML/MIN/1.73M2 — SIGNIFICANT CHANGE UP
GLUCOSE BLDC GLUCOMTR-MCNC: 203 MG/DL — HIGH (ref 70–99)
GLUCOSE BLDC GLUCOMTR-MCNC: 210 MG/DL — HIGH (ref 70–99)
GLUCOSE BLDC GLUCOMTR-MCNC: 219 MG/DL — HIGH (ref 70–99)
GLUCOSE BLDC GLUCOMTR-MCNC: 243 MG/DL — HIGH (ref 70–99)
GLUCOSE SERPL-MCNC: 209 MG/DL — HIGH (ref 70–99)
HCT VFR BLD CALC: 25 % — LOW (ref 34.5–45)
HGB BLD-MCNC: 7.8 G/DL — LOW (ref 11.5–15.5)
MCHC RBC-ENTMCNC: 28 PG — SIGNIFICANT CHANGE UP (ref 27–34)
MCHC RBC-ENTMCNC: 31.2 G/DL — LOW (ref 32–36)
MCV RBC AUTO: 89.6 FL — SIGNIFICANT CHANGE UP (ref 80–100)
NRBC # BLD AUTO: 0 K/UL — SIGNIFICANT CHANGE UP (ref 0–0)
NRBC # FLD: 0 K/UL — SIGNIFICANT CHANGE UP (ref 0–0)
NRBC BLD AUTO-RTO: 0 /100 WBCS — SIGNIFICANT CHANGE UP (ref 0–0)
PLATELET # BLD AUTO: 182 K/UL — SIGNIFICANT CHANGE UP (ref 150–400)
PMV BLD: 10.8 FL — SIGNIFICANT CHANGE UP (ref 7–13)
POTASSIUM SERPL-MCNC: 4.6 MMOL/L — SIGNIFICANT CHANGE UP (ref 3.5–5.3)
POTASSIUM SERPL-SCNC: 4.6 MMOL/L — SIGNIFICANT CHANGE UP (ref 3.5–5.3)
RBC # BLD: 2.79 M/UL — LOW (ref 3.8–5.2)
RBC # FLD: 15.1 % — HIGH (ref 10.3–14.5)
SODIUM SERPL-SCNC: 141 MMOL/L — SIGNIFICANT CHANGE UP (ref 135–145)
WBC # BLD: 5.84 K/UL — SIGNIFICANT CHANGE UP (ref 3.8–10.5)
WBC # FLD AUTO: 5.84 K/UL — SIGNIFICANT CHANGE UP (ref 3.8–10.5)

## 2025-03-23 PROCEDURE — 99232 SBSQ HOSP IP/OBS MODERATE 35: CPT

## 2025-03-23 RX ADMIN — CELECOXIB 200 MILLIGRAM(S): 50 CAPSULE ORAL at 09:59

## 2025-03-23 RX ADMIN — INSULIN LISPRO 2: 100 INJECTION, SOLUTION INTRAVENOUS; SUBCUTANEOUS at 11:24

## 2025-03-23 RX ADMIN — Medication 650 MILLIGRAM(S): at 18:17

## 2025-03-23 RX ADMIN — SERTRALINE 250 MILLIGRAM(S): 100 TABLET, FILM COATED ORAL at 21:07

## 2025-03-23 RX ADMIN — INSULIN LISPRO 2: 100 INJECTION, SOLUTION INTRAVENOUS; SUBCUTANEOUS at 16:31

## 2025-03-23 RX ADMIN — MIRTAZAPINE 15 MILLIGRAM(S): 30 TABLET, FILM COATED ORAL at 21:07

## 2025-03-23 RX ADMIN — Medication 650 MILLIGRAM(S): at 05:29

## 2025-03-23 RX ADMIN — METOPROLOL SUCCINATE 100 MILLIGRAM(S): 50 TABLET, EXTENDED RELEASE ORAL at 10:00

## 2025-03-23 RX ADMIN — OXYCODONE HYDROCHLORIDE 10 MILLIGRAM(S): 30 TABLET ORAL at 10:01

## 2025-03-23 RX ADMIN — Medication 40 MILLIGRAM(S): at 10:00

## 2025-03-23 RX ADMIN — OXYCODONE HYDROCHLORIDE 10 MILLIGRAM(S): 30 TABLET ORAL at 18:47

## 2025-03-23 RX ADMIN — FENOFIBRATE 145 MILLIGRAM(S): 160 TABLET ORAL at 09:59

## 2025-03-23 RX ADMIN — ATORVASTATIN CALCIUM 40 MILLIGRAM(S): 80 TABLET, FILM COATED ORAL at 21:07

## 2025-03-23 RX ADMIN — Medication 650 MILLIGRAM(S): at 11:24

## 2025-03-23 RX ADMIN — INSULIN GLARGINE-YFGN 15 UNIT(S): 100 INJECTION, SOLUTION SUBCUTANEOUS at 08:31

## 2025-03-23 RX ADMIN — OXYCODONE HYDROCHLORIDE 10 MILLIGRAM(S): 30 TABLET ORAL at 03:58

## 2025-03-23 RX ADMIN — OXYCODONE HYDROCHLORIDE 10 MILLIGRAM(S): 30 TABLET ORAL at 10:31

## 2025-03-23 RX ADMIN — Medication 5 MILLIGRAM(S): at 18:56

## 2025-03-23 RX ADMIN — INSULIN LISPRO 2: 100 INJECTION, SOLUTION INTRAVENOUS; SUBCUTANEOUS at 08:32

## 2025-03-23 RX ADMIN — OXYCODONE HYDROCHLORIDE 10 MILLIGRAM(S): 30 TABLET ORAL at 03:28

## 2025-03-23 RX ADMIN — INSULIN LISPRO 2: 100 INJECTION, SOLUTION INTRAVENOUS; SUBCUTANEOUS at 21:06

## 2025-03-23 RX ADMIN — OXYCODONE HYDROCHLORIDE 10 MILLIGRAM(S): 30 TABLET ORAL at 18:17

## 2025-03-23 NOTE — PROGRESS NOTE ADULT - SUBJECTIVE AND OBJECTIVE BOX
71 yo WF presents with progressive back and R thigh pain. Patient underwent lumbar lami/fusion L4-S1 about 1-1/2 yrs ago. Patient was doing well immediately post op but notes progressive c/o back and R thigh pain. Studies reveal adjacent level DDD with spinal stenosis and migration of the R L4 pedical screw thru the superior endplate of L4. Patient failed nonoperative modalities and underwent expl fusion, removal hardware, lami L3/4, laminotomy L2/3, fusion L3-S1, TLIF L3/4, cage, instrumentation, LBG, BMP on 3/18/25.    3/19/25 Patient with incisional pain but no thigh pain  3/20/25 Patient c/o incisional and abdominal pain today  3/21/25 Patient comfortable. no abdominal pain    [id number] Patient comfortable today    PAST MEDICAL & SURGICAL HISTORY:  Renal calculi      DM type 2 (diabetes mellitus, type 2)      HTN (hypertension)      Lower back pain      HLD (hyperlipidemia)      Anxiety and depression      Arthritis      Torn meniscus      Gastroparesis      Diverticulosis      History of myocardial infarction      Kidney disease      History of hepatitis C      Morbid obesity      Hiatal hernia      GERD (gastroesophageal reflux disease)      Umbilical hernia      CAD (coronary artery disease)      Heart murmur      Insomnia      History of diverticulitis      Stage 3 chronic kidney disease      Marijuana use      Endometrial thickening on ultrasound      DHRUV (obstructive sleep apnea)      H/O peripheral neuropathy      History of lumbar laminectomy      History of lumbar fusion      History of 2  sections      History of colonoscopy      History of percutaneous angioplasty      H/O arthroscopy of left knee      S/P cardiac catheterization    MEDICATIONS  (STANDING):  acetaminophen     Tablet .. 650 milliGRAM(s) Oral every 6 hours  acetaminophen   IVPB .. 1000 milliGRAM(s) IV Intermittent once  atorvastatin 40 milliGRAM(s) Oral at bedtime  celecoxib 200 milliGRAM(s) Oral daily  dextrose 5%. 1000 milliLiter(s) (100 mL/Hr) IV Continuous <Continuous>  dextrose 5%. 1000 milliLiter(s) (50 mL/Hr) IV Continuous <Continuous>  dextrose 50% Injectable 25 Gram(s) IV Push once  dextrose 50% Injectable 12.5 Gram(s) IV Push once  dextrose 50% Injectable 25 Gram(s) IV Push once  enalapril 5 milliGRAM(s) Oral at bedtime  fenofibrate Tablet 145 milliGRAM(s) Oral daily  glucagon  Injectable 1 milliGRAM(s) IntraMuscular once  insulin glargine Injectable (LANTUS) 15 Unit(s) SubCutaneous every morning  insulin lispro (ADMELOG) corrective regimen sliding scale   SubCutaneous Before meals and at bedtime  metoprolol succinate  milliGRAM(s) Oral daily  mirtazapine 15 milliGRAM(s) Oral at bedtime  multivitamin 1 Tablet(s) Oral daily  pantoprazole    Tablet 40 milliGRAM(s) Oral daily  polyethylene glycol 3350 17 Gram(s) Oral daily  senna 2 Tablet(s) Oral at bedtime  sertraline 250 milliGRAM(s) Oral at bedtime    MEDICATIONS  (PRN):  ALPRAZolam 0.5 milliGRAM(s) Oral daily PRN anxiety  bisacodyl 5 milliGRAM(s) Oral every 12 hours PRN Constipation  dextrose Oral Gel 15 Gram(s) Oral once PRN Blood Glucose LESS THAN 70 milliGRAM(s)/deciliter  morphine  - Injectable 2 milliGRAM(s) IV Push every 4 hours PRN breakthrough pain  naloxone Injectable 0.1 milliGRAM(s) IV Push every 3 minutes PRN For ANY of the following changes in patient status:  A. RR LESS THAN 10 breaths per minute, B. Oxygen saturation LESS THAN 90%, C. Sedation score of 6  ondansetron Injectable 4 milliGRAM(s) IV Push every 8 hours PRN Nausea and/or Vomiting  oxyCODONE    IR 5 milliGRAM(s) Oral every 4 hours PRN Moderate Pain (4 - 6)  oxyCODONE    IR 10 milliGRAM(s) Oral every 4 hours PRN Severe Pain (7 - 10)      Allergies  strawberry (Other (Moderate))  No Known Drug Allergies  copper (Hives)    Intolerances    morphine (Other (Mild))  codeine (Other (Mild))    PE:    Vital Signs Last 24 Hrs  T(C): 36.4 (23 Mar 2025 11:10), Max: 37.1 (22 Mar 2025 16:03)  T(F): 97.6 (23 Mar 2025 11:10), Max: 98.7 (22 Mar 2025 16:03)  HR: 68 (23 Mar 2025 11:10) (59 - 68)  BP: 141/90 (23 Mar 2025 11:10) (121/96 - 177/80)  BP(mean): 95 (22 Mar 2025 16:03) (95 - 95)  RR: 17 (23 Mar 2025 11:10) (16 - 17)  SpO2: 98% (23 Mar 2025 11:10) (97% - 100%)    Parameters below as of 23 Mar 2025 11:10  Patient On (Oxygen Delivery Method): room air      Patient laying in bed with comfortable  Tolerating diet-(+) flatus/ BM  Voiding with adequate U/O  Incision clean and dry  Deep JAMSHID removed yesterday  Neuro without deficits    LABS                        7.8    5.84  )-----------( 182      ( 23 Mar 2025 08:36 )             25.0     03-23    141  |  110[H]  |  18  ----------------------------<  209[H]  4.6   |  27  |  0.84    Ca    9.2      23 Mar 2025 08:36  Phos  2.1     03-22  Mg     1.8     22    TPro  6.1  /  Alb  2.4[L]  /  TBili  0.4  /  DBili  x   /  AST  16  /  ALT  12  /  AlkPhos  36[L]  22

## 2025-03-23 NOTE — PROGRESS NOTE ADULT - ASSESSMENT
#s/p removal hardware, lami L3/4, laminotomy L2/3, fusion L3-S1, TLIF L3/4, cage, instrumentation  -pain control, out of bed to chair   -res of management as per surgical team   -bowel care     #Anemia of Blood Loss  -monitor CBC   -agree with 1 unit blood transfusion    #HTN  -enalpril, metoprolol  -monitor it     #DM- ct insuline, adjust as per reading     #HLD- Fenofibrate, Lipitor     #CAD-ct fenofibrate, lipitor, metoprolol, not on ASA probably due to surgical reason for now    #Anxiety -alprazolam     #hypophosphatemia- replace and monitor it     #Depression -remeron    #dvt pr -scd

## 2025-03-23 NOTE — PROGRESS NOTE ADULT - SUBJECTIVE AND OBJECTIVE BOX
HOSPITALIST PROGRESS NOTE:  SUBJECTIVE:  PCP:  Chief Complaint: Patient is a 70y old  Female who presents with a chief complaint of back and leg pain difficulty walking (22 Mar 2025 12:23)      HPI:  71 yo WF presents with progressive back and R thigh pain. patient underwent lumbar lami/fusion L4-S1 about 1-1/2 yrs ago. Patient was doing well immediately post op but notes progressive c/o back and R thigh pain. Studies reveal adjacent level DDD with spinal stenosis and migration of the R L4 pedical screw thru the superior endplate of L4. Patient failed nonoperative modalities and underwent expl fusion, removal hardware, lami L3/4, laminotomy L2/3, fusion L3-S1, TLIF L3/4, cage, instrumentation, LBG, BMP on 3/18/25. Initially managed in ICU by ICU team and now transferred to hospitalist service     3/22: Above reviewed; patient has no complaints. Np CP, dyspnea, Palpitations. n/v/d  3/23: Patient has no complaints  and wants to go home; H/h still on lower side; NO CP, abd pain or dyspnea     Allergies:  strawberry (Other (Moderate))  No Known Drug Allergies  morphine (Other (Mild))  copper (Hives)  codeine (Other (Mild))    REVIEW OF SYSTEMS:  See HPI. All other review of systems is negative unless indicated above.     OBJECTIVE  Physical Exam:  Vital Signs Last 24 Hrs  T(C): 36.4 (23 Mar 2025 11:10), Max: 37.1 (22 Mar 2025 16:03)  T(F): 97.6 (23 Mar 2025 11:10), Max: 98.7 (22 Mar 2025 16:03)  HR: 68 (23 Mar 2025 11:10) (59 - 68)  BP: 141/90 (23 Mar 2025 11:10) (121/96 - 177/80)  BP(mean): 95 (22 Mar 2025 16:03) (95 - 95)  RR: 17 (23 Mar 2025 11:10) (16 - 17)  SpO2: 98% (23 Mar 2025 11:10) (97% - 100%)    Parameters below as of 23 Mar 2025 11:10  Patient On (Oxygen Delivery Method): room air    Constitutional: NAD, awake and alert  Neurological: AAO x 3, no focal deficits  HEENT: PERRLA, EOMI, MMM  Neck: Soft and supple, No LAD, No JVD  Respiratory: Breath sounds are clear bilaterally, No wheezing, rales or rhonchi  Cardiovascular: S1 and S2, regular rate and rhythm; no Murmurs, gallops or rubs  Gastrointestinal: Bowel Sounds present, soft, nontender, nondistended, no guarding, no rebound tenderness  Back: No CVA tenderness   Extremities: No peripheral edema  Vascular: 2+ peripheral pulses  Musculoskeletal: 5/5 strength b/l upper and lower extremities  Skin: No rashes  Breast: Deferred  Rectal: Deferred    MEDICATIONS  (STANDING):  acetaminophen     Tablet .. 650 milliGRAM(s) Oral every 6 hours  acetaminophen   IVPB .. 1000 milliGRAM(s) IV Intermittent once  atorvastatin 40 milliGRAM(s) Oral at bedtime  celecoxib 200 milliGRAM(s) Oral daily  dextrose 5%. 1000 milliLiter(s) (50 mL/Hr) IV Continuous <Continuous>  dextrose 5%. 1000 milliLiter(s) (100 mL/Hr) IV Continuous <Continuous>  dextrose 50% Injectable 25 Gram(s) IV Push once  dextrose 50% Injectable 12.5 Gram(s) IV Push once  dextrose 50% Injectable 25 Gram(s) IV Push once  enalapril 5 milliGRAM(s) Oral at bedtime  fenofibrate Tablet 145 milliGRAM(s) Oral daily  glucagon  Injectable 1 milliGRAM(s) IntraMuscular once  insulin glargine Injectable (LANTUS) 15 Unit(s) SubCutaneous every morning  insulin lispro (ADMELOG) corrective regimen sliding scale   SubCutaneous Before meals and at bedtime  metoprolol succinate  milliGRAM(s) Oral daily  mirtazapine 15 milliGRAM(s) Oral at bedtime  multivitamin 1 Tablet(s) Oral daily  pantoprazole    Tablet 40 milliGRAM(s) Oral daily  polyethylene glycol 3350 17 Gram(s) Oral daily  senna 2 Tablet(s) Oral at bedtime  sertraline 250 milliGRAM(s) Oral at bedtime    Lab Results:  CBC  CBC Full  -  ( 23 Mar 2025 08:36 )  WBC Count : 5.84 K/uL  RBC Count : 2.79 M/uL  Hemoglobin : 7.8 g/dL  Hematocrit : 25.0 %  Platelet Count - Automated : 182 K/uL  Mean Cell Volume : 89.6 fl  Mean Cell Hemoglobin : 28.0 pg  Mean Cell Hemoglobin Concentration : 31.2 g/dL  Auto Neutrophil # : x  Auto Lymphocyte # : x  Auto Monocyte # : x  Auto Eosinophil # : x  Auto Basophil # : x  Auto Neutrophil % : x  Auto Lymphocyte % : x  Auto Monocyte % : x  Auto Eosinophil % : x  Auto Basophil % : x    .		Differential:	[] Automated		[] Manual  Chemistry                        7.8    5.84  )-----------( 182      ( 23 Mar 2025 08:36 )             25.0     03-23    141  |  110[H]  |  18  ----------------------------<  209[H]  4.6   |  27  |  0.84    Ca    9.2      23 Mar 2025 08:36  Phos  2.1     03-22  Mg     1.8     03-22    TPro  6.1  /  Alb  2.4[L]  /  TBili  0.4  /  DBili  x   /  AST  16  /  ALT  12  /  AlkPhos  36[L]  03-22    LIVER FUNCTIONS - ( 22 Mar 2025 07:51 )  Alb: 2.4 g/dL / Pro: 6.1 gm/dL / ALK PHOS: 36 U/L / ALT: 12 U/L / AST: 16 U/L / GGT: x             Urinalysis Basic - ( 23 Mar 2025 08:36 )    Color: x / Appearance: x / SG: x / pH: x  Gluc: 209 mg/dL / Ketone: x  / Bili: x / Urobili: x   Blood: x / Protein: x / Nitrite: x   Leuk Esterase: x / RBC: x / WBC x   Sq Epi: x / Non Sq Epi: x / Bacteria: x      RADIOLOGY/EKG:    < from: Xray Abdomen 1 View PORTABLE -Urgent (Xray Abdomen 1 View PORTABLE -Urgent .) (03.20.25 @ 10:03) >    IMPRESSION: No jr evidenceof free intraperitoneal air.    IMPRESSION: Lumbar surgery. No acute finding.    < end of copied text >

## 2025-03-23 NOTE — PROGRESS NOTE ADULT - ASSESSMENT
69 yo WF presents with progressive back and R thigh pain. patient underwent lumbar lami/fusion L4-S1 about 1-1/2 yrs ago. Patient was doing well immediately post op but notes progressive c/o back and R thigh pain. Studies reveal adjacent level DDD with spinal stenosis and migration of the R L4 pedical screw thru the superior endplate of L4. Patient failed nonoperative modalities and underwent expl fusion, removal hardware, lami L3/4, laminotomy L2/3, fusion L3-S1, TLIF L3/4, cage, instrumentation, LBG, BMP on 3/18/25.    POD #5  Analgesia prn  Increase OOB/PT  Recommend transfusing 1u PC today due to low H&H and hx CAD  Monitor labs in AM  Monitor U/O  Medical management

## 2025-03-24 LAB
APPEARANCE UR: CLEAR — SIGNIFICANT CHANGE UP
BACTERIA # UR AUTO: NEGATIVE /HPF — SIGNIFICANT CHANGE UP
BILIRUB UR-MCNC: NEGATIVE — SIGNIFICANT CHANGE UP
CAST: 1 /LPF — SIGNIFICANT CHANGE UP (ref 0–4)
COLOR SPEC: YELLOW — SIGNIFICANT CHANGE UP
DIFF PNL FLD: NEGATIVE — SIGNIFICANT CHANGE UP
GLUCOSE BLDC GLUCOMTR-MCNC: 204 MG/DL — HIGH (ref 70–99)
GLUCOSE BLDC GLUCOMTR-MCNC: 214 MG/DL — HIGH (ref 70–99)
GLUCOSE BLDC GLUCOMTR-MCNC: 214 MG/DL — HIGH (ref 70–99)
GLUCOSE BLDC GLUCOMTR-MCNC: 221 MG/DL — HIGH (ref 70–99)
GLUCOSE UR QL: 100 MG/DL
HCT VFR BLD CALC: 30 % — LOW (ref 34.5–45)
HGB BLD-MCNC: 9.7 G/DL — LOW (ref 11.5–15.5)
KETONES UR-MCNC: NEGATIVE MG/DL — SIGNIFICANT CHANGE UP
LEUKOCYTE ESTERASE UR-ACNC: NEGATIVE — SIGNIFICANT CHANGE UP
MCHC RBC-ENTMCNC: 28.3 PG — SIGNIFICANT CHANGE UP (ref 27–34)
MCHC RBC-ENTMCNC: 32.3 G/DL — SIGNIFICANT CHANGE UP (ref 32–36)
MCV RBC AUTO: 87.5 FL — SIGNIFICANT CHANGE UP (ref 80–100)
NITRITE UR-MCNC: NEGATIVE — SIGNIFICANT CHANGE UP
NRBC # BLD AUTO: 0 K/UL — SIGNIFICANT CHANGE UP (ref 0–0)
NRBC # FLD: 0 K/UL — SIGNIFICANT CHANGE UP (ref 0–0)
NRBC BLD AUTO-RTO: 0 /100 WBCS — SIGNIFICANT CHANGE UP (ref 0–0)
PH UR: 8 — SIGNIFICANT CHANGE UP (ref 5–8)
PLATELET # BLD AUTO: 222 K/UL — SIGNIFICANT CHANGE UP (ref 150–400)
PMV BLD: 10.7 FL — SIGNIFICANT CHANGE UP (ref 7–13)
PROT UR-MCNC: 100 MG/DL
RBC # BLD: 3.43 M/UL — LOW (ref 3.8–5.2)
RBC # FLD: 15.2 % — HIGH (ref 10.3–14.5)
RBC CASTS # UR COMP ASSIST: 5 /HPF — HIGH (ref 0–4)
SP GR SPEC: 1.01 — SIGNIFICANT CHANGE UP (ref 1–1.03)
SQUAMOUS # UR AUTO: 8 /HPF — HIGH (ref 0–5)
UROBILINOGEN FLD QL: 1 MG/DL — SIGNIFICANT CHANGE UP (ref 0.2–1)
WBC # BLD: 7.58 K/UL — SIGNIFICANT CHANGE UP (ref 3.8–10.5)
WBC # FLD AUTO: 7.58 K/UL — SIGNIFICANT CHANGE UP (ref 3.8–10.5)
WBC UR QL: 3 /HPF — SIGNIFICANT CHANGE UP (ref 0–5)

## 2025-03-24 PROCEDURE — 99233 SBSQ HOSP IP/OBS HIGH 50: CPT

## 2025-03-24 RX ORDER — AMLODIPINE BESYLATE 10 MG/1
10 TABLET ORAL DAILY
Refills: 0 | Status: DISCONTINUED | OUTPATIENT
Start: 2025-03-24 | End: 2025-03-25

## 2025-03-24 RX ORDER — ENALAPRIL MALEATE 20 MG
5 TABLET ORAL AT BEDTIME
Refills: 0 | Status: DISCONTINUED | OUTPATIENT
Start: 2025-03-24 | End: 2025-03-25

## 2025-03-24 RX ORDER — BUTALBITAL, ACETAMINOPHEN AND CAFFEINE 50; 325; 40 MG/1; MG/1; MG/1
1 TABLET ORAL EVERY 6 HOURS
Refills: 0 | Status: DISCONTINUED | OUTPATIENT
Start: 2025-03-24 | End: 2025-03-24

## 2025-03-24 RX ORDER — LACTOBACILLUS ACIDOPHILUS/PECT 75 MM-100
1 CAPSULE ORAL
Refills: 0 | Status: DISCONTINUED | OUTPATIENT
Start: 2025-03-24 | End: 2025-03-25

## 2025-03-24 RX ORDER — CEFUROXIME SODIUM 1.5 G
250 VIAL (EA) INJECTION EVERY 12 HOURS
Refills: 0 | Status: DISCONTINUED | OUTPATIENT
Start: 2025-03-24 | End: 2025-03-25

## 2025-03-24 RX ADMIN — Medication 5 MILLIGRAM(S): at 22:31

## 2025-03-24 RX ADMIN — INSULIN LISPRO 2: 100 INJECTION, SOLUTION INTRAVENOUS; SUBCUTANEOUS at 09:56

## 2025-03-24 RX ADMIN — Medication 10 MILLIGRAM(S): at 04:56

## 2025-03-24 RX ADMIN — OXYCODONE HYDROCHLORIDE 10 MILLIGRAM(S): 30 TABLET ORAL at 22:31

## 2025-03-24 RX ADMIN — Medication 2 MILLIGRAM(S): at 02:49

## 2025-03-24 RX ADMIN — INSULIN LISPRO 2: 100 INJECTION, SOLUTION INTRAVENOUS; SUBCUTANEOUS at 22:38

## 2025-03-24 RX ADMIN — OXYCODONE HYDROCHLORIDE 10 MILLIGRAM(S): 30 TABLET ORAL at 10:28

## 2025-03-24 RX ADMIN — Medication 1 TABLET(S): at 17:22

## 2025-03-24 RX ADMIN — Medication 10 MILLIGRAM(S): at 01:01

## 2025-03-24 RX ADMIN — CELECOXIB 200 MILLIGRAM(S): 50 CAPSULE ORAL at 09:32

## 2025-03-24 RX ADMIN — OXYCODONE HYDROCHLORIDE 10 MILLIGRAM(S): 30 TABLET ORAL at 23:01

## 2025-03-24 RX ADMIN — Medication 40 MILLIGRAM(S): at 09:55

## 2025-03-24 RX ADMIN — Medication 10 MILLIGRAM(S): at 07:50

## 2025-03-24 RX ADMIN — INSULIN LISPRO 2: 100 INJECTION, SOLUTION INTRAVENOUS; SUBCUTANEOUS at 11:14

## 2025-03-24 RX ADMIN — Medication 2 MILLIGRAM(S): at 07:51

## 2025-03-24 RX ADMIN — Medication 0.5 MILLIGRAM(S): at 01:33

## 2025-03-24 RX ADMIN — Medication 650 MILLIGRAM(S): at 17:28

## 2025-03-24 RX ADMIN — OXYCODONE HYDROCHLORIDE 10 MILLIGRAM(S): 30 TABLET ORAL at 09:58

## 2025-03-24 RX ADMIN — Medication 2 MILLIGRAM(S): at 02:34

## 2025-03-24 RX ADMIN — Medication 650 MILLIGRAM(S): at 12:58

## 2025-03-24 RX ADMIN — ATORVASTATIN CALCIUM 40 MILLIGRAM(S): 80 TABLET, FILM COATED ORAL at 22:31

## 2025-03-24 RX ADMIN — MIRTAZAPINE 15 MILLIGRAM(S): 30 TABLET, FILM COATED ORAL at 22:32

## 2025-03-24 RX ADMIN — METOPROLOL SUCCINATE 100 MILLIGRAM(S): 50 TABLET, EXTENDED RELEASE ORAL at 09:32

## 2025-03-24 RX ADMIN — Medication 650 MILLIGRAM(S): at 00:04

## 2025-03-24 RX ADMIN — AMLODIPINE BESYLATE 10 MILLIGRAM(S): 10 TABLET ORAL at 09:33

## 2025-03-24 RX ADMIN — FENOFIBRATE 145 MILLIGRAM(S): 160 TABLET ORAL at 09:57

## 2025-03-24 RX ADMIN — Medication 650 MILLIGRAM(S): at 04:57

## 2025-03-24 RX ADMIN — INSULIN GLARGINE-YFGN 15 UNIT(S): 100 INJECTION, SOLUTION SUBCUTANEOUS at 09:32

## 2025-03-24 RX ADMIN — SERTRALINE 250 MILLIGRAM(S): 100 TABLET, FILM COATED ORAL at 22:30

## 2025-03-24 RX ADMIN — Medication 250 MILLIGRAM(S): at 22:31

## 2025-03-24 RX ADMIN — Medication 2 MILLIGRAM(S): at 08:21

## 2025-03-24 RX ADMIN — INSULIN LISPRO 2: 100 INJECTION, SOLUTION INTRAVENOUS; SUBCUTANEOUS at 17:23

## 2025-03-24 NOTE — PROGRESS NOTE ADULT - ASSESSMENT
#s/p removal hardware, lami L3/4, laminotomy L2/3, fusion L3-S1, TLIF L3/4, cage, instrumentation on 3/18  -pain control, out of bed to chair   -rest of the management as per surgical team   -bowel care     #Urinary Dysuria/frequency and urgency  -UA neg  -Fu Bladder scan if neg then possible treat with PO ceftin X 3 days as patient has clinical symptoms of UTI    #Anemia of Blood Loss  -monitor CBC   -S/P 1 unit blood transfusion    #HTN - uncontrolled multifactorial (anxiety, insomnia, pain)  -continue enalpril, metoprolol; start norvasc 10mg PO QD  -Hydralazine 5 mg IV Every q6H prn SBP>170  -monitor BP    #DM- ct insuline, adjust as per reading     #HLD- Fenofibrate, Lipitor     #CAD-ct fenofibrate, lipitor, metoprolol, not on ASA probably due to surgical reason for now    #Anxiety/Isonmia- alprazolam and Ambien    #Depression -remeron    #dvt pr -scd        #s/p removal hardware, lami L3/4, laminotomy L2/3, fusion L3-S1, TLIF L3/4, cage, instrumentation on 3/18  -pain control, out of bed to chair   -rest of the management as per surgical team   -bowel care     #Urinary Dysuria/frequency and urgency  -UA neg  -Bladder scan neg then  -treat prophylactically with PO ceftin X 3 days as patient has clinical symptoms of UTI    #Anemia of Blood Loss  -monitor CBC   -S/P 1 unit blood transfusion    #HTN - uncontrolled multifactorial (anxiety, insomnia, pain)  -continue enalpril, metoprolol; start norvasc 10mg PO QD  -Hydralazine 5 mg IV Every q6H prn SBP>170  -monitor BP    #DM- ct insuline, adjust as per reading     #HLD- Fenofibrate, Lipitor     #CAD-ct fenofibrate, lipitor, metoprolol, not on ASA probably due to surgical reason for now    #Anxiety/Isonmia- alprazolam and Ambien    #Depression -remeron    #dvt pr -scd

## 2025-03-24 NOTE — PROGRESS NOTE ADULT - SUBJECTIVE AND OBJECTIVE BOX
c/o lower abd pressure  c/o mild lbp  afeb vss  nv intact  wound ok  oob walking  received i unit prbc yesterday   9.7/30  ua today is negative for bacteria/ 3 wbc

## 2025-03-24 NOTE — PROGRESS NOTE ADULT - PROBLEM SELECTOR PLAN 1
feeling a little better  postlami syndrome  hospitalist to treat for possible uti
stable ortho wise  will recheck labs  in am

## 2025-03-24 NOTE — PROGRESS NOTE ADULT - SUBJECTIVE AND OBJECTIVE BOX
HOSPITALIST PROGRESS NOTE:  SUBJECTIVE:  PCP:  Chief Complaint: Patient is a 70y old  Female who presents with a chief complaint of back and leg pain difficulty walking (22 Mar 2025 12:23)      HPI:  71 yo WF presents with progressive back and R thigh pain. patient underwent lumbar lami/fusion L4-S1 about 1-1/2 yrs ago. Patient was doing well immediately post op but notes progressive c/o back and R thigh pain. Studies reveal adjacent level DDD with spinal stenosis and migration of the R L4 pedical screw thru the superior endplate of L4. Patient failed nonoperative modalities and underwent expl fusion, removal hardware, lami L3/4, laminotomy L2/3, fusion L3-S1, TLIF L3/4, cage, instrumentation, LBG, BMP on 3/18/25. Initially managed in ICU by ICU team and now transferred to hospitalist service     3/22: Above reviewed; patient has no complaints. Np CP, dyspnea, Palpitations. n/v/d  3/23: Patient has no complaints  and wants to go home; H/h still on lower side; NO CP, abd pain or dyspnea   3/24: Patient has LLQ pain and pain upon urination; +urinary frequency and urgency; tired of going to the bathroom frequently to urinate; she also has no been sleeping as she it not getting her ambien here. overnight BP was high and patient was given IV hydralazine    Allergies:  strawberry (Other (Moderate))  No Known Drug Allergies  morphine (Other (Mild))  copper (Hives)  codeine (Other (Mild))    REVIEW OF SYSTEMS:  See HPI. All other review of systems is negative unless indicated above.     OBJECTIVE  Physical Exam:  Vital Signs Last 24 Hrs  T(C): 36.7 (24 Mar 2025 07:27), Max: 36.8 (23 Mar 2025 15:31)  T(F): 98 (24 Mar 2025 07:27), Max: 98.3 (23 Mar 2025 15:31)  HR: 67 (24 Mar 2025 11:27) (57 - 71)  BP: 137/55 (24 Mar 2025 11:27) (137/55 - 189/74)  BP(mean): 95 (23 Mar 2025 20:05) (95 - 95)  RR: 18 (24 Mar 2025 07:27) (16 - 18)  SpO2: 98% (24 Mar 2025 07:27) (97% - 100%)    Parameters below as of 24 Mar 2025 07:27  Patient On (Oxygen Delivery Method): room air      Constitutional: NAD, awake and alert  Neurological: AAO x 3, no focal deficits  HEENT: PERRLA, EOMI, MMM  Neck: Soft and supple, No LAD, No JVD  Respiratory: Breath sounds are clear bilaterally, No wheezing, rales or rhonchi  Cardiovascular: S1 and S2, regular rate and rhythm; no Murmurs, gallops or rubs  Gastrointestinal: Bowel Sounds present, soft, nontender, nondistended, no guarding, no rebound tenderness  Back: No CVA tenderness +back brace   Extremities: + peripheral edema  Vascular: 2+ peripheral pulses  Musculoskeletal: 5/5 strength b/l upper and lower extremities  Skin: No rashes  Breast: Deferred  Rectal: Deferred    MEDICATIONS  (STANDING):  acetaminophen     Tablet .. 650 milliGRAM(s) Oral every 6 hours  acetaminophen   IVPB .. 1000 milliGRAM(s) IV Intermittent once  atorvastatin 40 milliGRAM(s) Oral at bedtime  celecoxib 200 milliGRAM(s) Oral daily  dextrose 5%. 1000 milliLiter(s) (50 mL/Hr) IV Continuous <Continuous>  dextrose 5%. 1000 milliLiter(s) (100 mL/Hr) IV Continuous <Continuous>  dextrose 50% Injectable 25 Gram(s) IV Push once  dextrose 50% Injectable 12.5 Gram(s) IV Push once  dextrose 50% Injectable 25 Gram(s) IV Push once  enalapril 5 milliGRAM(s) Oral at bedtime  fenofibrate Tablet 145 milliGRAM(s) Oral daily  glucagon  Injectable 1 milliGRAM(s) IntraMuscular once  insulin glargine Injectable (LANTUS) 15 Unit(s) SubCutaneous every morning  insulin lispro (ADMELOG) corrective regimen sliding scale   SubCutaneous Before meals and at bedtime  metoprolol succinate  milliGRAM(s) Oral daily  mirtazapine 15 milliGRAM(s) Oral at bedtime  multivitamin 1 Tablet(s) Oral daily  pantoprazole    Tablet 40 milliGRAM(s) Oral daily  polyethylene glycol 3350 17 Gram(s) Oral daily  senna 2 Tablet(s) Oral at bedtime  sertraline 250 milliGRAM(s) Oral at bedtime    Lab Results:  CBC  CBC Full  -  ( 24 Mar 2025 08:19 )  WBC Count : 7.58 K/uL  RBC Count : 3.43 M/uL  Hemoglobin : 9.7 g/dL  Hematocrit : 30.0 %  Platelet Count - Automated : 222 K/uL  Mean Cell Volume : 87.5 fl  Mean Cell Hemoglobin : 28.3 pg  Mean Cell Hemoglobin Concentration : 32.3 g/dL  Auto Neutrophil # : x  Auto Lymphocyte # : x  Auto Monocyte # : x  Auto Eosinophil # : x  Auto Basophil # : x  Auto Neutrophil % : x  Auto Lymphocyte % : x  Auto Monocyte % : x  Auto Eosinophil % : x  Auto Basophil % : x    .		Differential:	[] Automated		[] Manual  Chemistry                        9.7    7.58  )-----------( 222      ( 24 Mar 2025 08:19 )             30.0     03-    141  |  110[H]  |  18  ----------------------------<  209[H]  4.6   |  27  |  0.84    Ca    9.2      23 Mar 2025 08:36    Urinalysis Basic - ( 24 Mar 2025 11:00 )    Color: Yellow / Appearance: Clear / S.011 / pH: x  Gluc: x / Ketone: Negative mg/dL  / Bili: Negative / Urobili: 1.0 mg/dL   Blood: x / Protein: 100 mg/dL / Nitrite: Negative   Leuk Esterase: Negative / RBC: 5 /HPF / WBC 3 /HPF   Sq Epi: x / Non Sq Epi: 8 /HPF / Bacteria: Negative /HPF      Urinalysis Basic - ( 23 Mar 2025 08:36 )    Color: x / Appearance: x / SG: x / pH: x  Gluc: 209 mg/dL / Ketone: x  / Bili: x / Urobili: x   Blood: x / Protein: x / Nitrite: x   Leuk Esterase: x / RBC: x / WBC x   Sq Epi: x / Non Sq Epi: x / Bacteria: x      RADIOLOGY/EKG:    < from: Xray Abdomen 1 View PORTABLE -Urgent (Xray Abdomen 1 View PORTABLE -Urgent .) (25 @ 10:03) >    IMPRESSION: No jr evidenceof free intraperitoneal air.    IMPRESSION: Lumbar surgery. No acute finding.    < end of copied text >

## 2025-03-24 NOTE — PROGRESS NOTE ADULT - TIME BILLING
Time spent  coordinating the patient's care. This includes reviewing documentation pertinent to this admission, results and imaging. Further tests, medications, and procedures have been ordered as indicated. Laboratory results and the plan of care were communicated to the patient. Discussed care plan with consultants including ortho spine . Supporting documentation was completed and added to the patient's chart.
.

## 2025-03-24 NOTE — PROGRESS NOTE ADULT - PROVIDER SPECIALTY LIST ADULT
Critical Care
Hospitalist
Orthopedics
Orthopedics
SICU
SICU
Hospitalist
Orthopedics
Hospitalist
Orthopedics
Orthopedics

## 2025-03-24 NOTE — PROGRESS NOTE ADULT - SUBJECTIVE AND OBJECTIVE BOX
69 yo WF presents with progressive back and R thigh pain. Patient underwent lumbar lami/fusion L4-S1 about 1-1/2 yrs ago. Patient was doing well immediately post op but notes progressive c/o back and R thigh pain. Studies reveal adjacent level DDD with spinal stenosis and migration of the R L4 pedical screw thru the superior endplate of L4. Patient failed nonoperative modalities and underwent expl fusion, removal hardware, lami L3/4, laminotomy L2/3, fusion L3-S1, TLIF L3/4, cage, instrumentation, LBG, BMP on 3/18/25.    3/19/25 Patient with incisional pain but no thigh pain  3/20/25 Patient c/o incisional and abdominal pain today  3/21/25 Patient comfortable. no abdominal pain    3/23/25 Patient comfortable today  3/24/25 Patient c/o pressure in bladder and urinary frequency causing palpitation and elevated blood pressure, S/P transfusion yesterday    PAST MEDICAL & SURGICAL HISTORY:  Renal calculi      DM type 2 (diabetes mellitus, type 2)      HTN (hypertension)      Lower back pain      HLD (hyperlipidemia)      Anxiety and depression      Arthritis      Torn meniscus      Gastroparesis      Diverticulosis      History of myocardial infarction      Kidney disease      History of hepatitis C      Morbid obesity      Hiatal hernia      GERD (gastroesophageal reflux disease)      Umbilical hernia      CAD (coronary artery disease)      Heart murmur      Insomnia      History of diverticulitis      Stage 3 chronic kidney disease      Marijuana use      Endometrial thickening on ultrasound      DHRUV (obstructive sleep apnea)      H/O peripheral neuropathy      History of lumbar laminectomy      History of lumbar fusion      History of 2  sections      History of colonoscopy      History of percutaneous angioplasty      H/O arthroscopy of left knee      S/P cardiac catheterization    MEDICATIONS  (STANDING):  acetaminophen     Tablet .. 650 milliGRAM(s) Oral every 6 hours  acetaminophen   IVPB .. 1000 milliGRAM(s) IV Intermittent once  amLODIPine   Tablet 10 milliGRAM(s) Oral daily  atorvastatin 40 milliGRAM(s) Oral at bedtime  celecoxib 200 milliGRAM(s) Oral daily  dextrose 5%. 1000 milliLiter(s) (100 mL/Hr) IV Continuous <Continuous>  dextrose 5%. 1000 milliLiter(s) (50 mL/Hr) IV Continuous <Continuous>  dextrose 50% Injectable 25 Gram(s) IV Push once  dextrose 50% Injectable 12.5 Gram(s) IV Push once  dextrose 50% Injectable 25 Gram(s) IV Push once  enalapril 5 milliGRAM(s) Oral at bedtime  fenofibrate Tablet 145 milliGRAM(s) Oral daily  glucagon  Injectable 1 milliGRAM(s) IntraMuscular once  insulin glargine Injectable (LANTUS) 15 Unit(s) SubCutaneous every morning  insulin lispro (ADMELOG) corrective regimen sliding scale   SubCutaneous Before meals and at bedtime  metoprolol succinate  milliGRAM(s) Oral daily  mirtazapine 15 milliGRAM(s) Oral at bedtime  multivitamin 1 Tablet(s) Oral daily  pantoprazole    Tablet 40 milliGRAM(s) Oral daily  polyethylene glycol 3350 17 Gram(s) Oral daily  senna 2 Tablet(s) Oral at bedtime  sertraline 250 milliGRAM(s) Oral at bedtime    MEDICATIONS  (PRN):  ALPRAZolam 0.5 milliGRAM(s) Oral daily PRN anxiety  bisacodyl 5 milliGRAM(s) Oral every 12 hours PRN Constipation  dextrose Oral Gel 15 Gram(s) Oral once PRN Blood Glucose LESS THAN 70 milliGRAM(s)/deciliter  morphine  - Injectable 2 milliGRAM(s) IV Push every 4 hours PRN breakthrough pain  naloxone Injectable 0.1 milliGRAM(s) IV Push every 3 minutes PRN For ANY of the following changes in patient status:  A. RR LESS THAN 10 breaths per minute, B. Oxygen saturation LESS THAN 90%, C. Sedation score of 6  ondansetron Injectable 4 milliGRAM(s) IV Push every 8 hours PRN Nausea and/or Vomiting  oxyCODONE    IR 5 milliGRAM(s) Oral every 4 hours PRN Moderate Pain (4 - 6)  oxyCODONE    IR 10 milliGRAM(s) Oral every 4 hours PRN Severe Pain (7 - 10)      Allergies  strawberry (Other (Moderate))  No Known Drug Allergies  copper (Hives)    Intolerances    morphine (Other (Mild))  codeine (Other (Mild))    PE:    Vital Signs Last 24 Hrs  T(C): 36.7 (24 Mar 2025 07:27), Max: 36.8 (23 Mar 2025 15:31)  T(F): 98 (24 Mar 2025 07:27), Max: 98.3 (23 Mar 2025 15:31)  HR: 65 (24 Mar 2025 07:27) (57 - 71)  BP: 180/60 (24 Mar 2025 07:27) (141/90 - 189/74)  BP(mean): 95 (23 Mar 2025 20:05) (95 - 95)  RR: 18 (24 Mar 2025 07:27) (16 - 18)  SpO2: 98% (24 Mar 2025 07:27) (97% - 100%)    Parameters below as of 24 Mar 2025 07:27  Patient On (Oxygen Delivery Method): room air    Patient laying in bed-very uncomfortable due to bladder pressure  Tolerating diet-(+) flatus/ BM  Voiding with some difficulty  Incision clean and dry  Neuro without deficits    LABS                                   9.7    7.58  )-----------( 222      ( 24 Mar 2025 08:19 )             30.0       03-23    141  |  110[H]  |  18  ----------------------------<  209[H]  4.6   |  27  |  0.84    Ca    9.2      23 Mar 2025 08:36  Phos  2.1     -  Mg     1.8         TPro  6.1  /  Alb  2.4[L]  /  TBili  0.4  /  DBili  x   /  AST  16  /  ALT  12  /  AlkPhos  36[L]  22

## 2025-03-24 NOTE — PROGRESS NOTE ADULT - ASSESSMENT
71 yo WF presents with progressive back and R thigh pain. patient underwent lumbar lami/fusion L4-S1 about 1-1/2 yrs ago. Patient was doing well immediately post op but notes progressive c/o back and R thigh pain. Studies reveal adjacent level DDD with spinal stenosis and migration of the R L4 pedical screw thru the superior endplate of L4. Patient failed nonoperative modalities and underwent expl fusion, removal hardware, lami L3/4, laminotomy L2/3, fusion L3-S1, TLIF L3/4, cage, instrumentation, LBG, BMP on 3/18/25.    POD #6  Analgesia prn  Increase OOB/PT  S/P 1u PC yesterday  Check Urinanalysis  Monitor U/O  Medical management  Hold today

## 2025-03-25 ENCOUNTER — TRANSCRIPTION ENCOUNTER (OUTPATIENT)
Age: 70
End: 2025-03-25

## 2025-03-25 VITALS
OXYGEN SATURATION: 96 % | TEMPERATURE: 98 F | RESPIRATION RATE: 18 BRPM | SYSTOLIC BLOOD PRESSURE: 166 MMHG | HEART RATE: 57 BPM | DIASTOLIC BLOOD PRESSURE: 62 MMHG

## 2025-03-25 LAB
GLUCOSE BLDC GLUCOMTR-MCNC: 253 MG/DL — HIGH (ref 70–99)
GLUCOSE BLDC GLUCOMTR-MCNC: 280 MG/DL — HIGH (ref 70–99)

## 2025-03-25 RX ORDER — HYDROCODONE BITARTRATE 20 MG/1
1 TABLET, EXTENDED RELEASE ORAL
Qty: 0 | Refills: 0 | DISCHARGE

## 2025-03-25 RX ORDER — CEFUROXIME SODIUM 1.5 G
1 VIAL (EA) INJECTION
Qty: 6 | Refills: 0
Start: 2025-03-25 | End: 2025-03-27

## 2025-03-25 RX ADMIN — INSULIN GLARGINE-YFGN 15 UNIT(S): 100 INJECTION, SOLUTION SUBCUTANEOUS at 08:05

## 2025-03-25 RX ADMIN — Medication 1 TABLET(S): at 07:40

## 2025-03-25 RX ADMIN — INSULIN LISPRO 3: 100 INJECTION, SOLUTION INTRAVENOUS; SUBCUTANEOUS at 11:49

## 2025-03-25 RX ADMIN — Medication 650 MILLIGRAM(S): at 05:18

## 2025-03-25 RX ADMIN — AMLODIPINE BESYLATE 10 MILLIGRAM(S): 10 TABLET ORAL at 09:36

## 2025-03-25 RX ADMIN — FENOFIBRATE 145 MILLIGRAM(S): 160 TABLET ORAL at 09:36

## 2025-03-25 RX ADMIN — Medication 40 MILLIGRAM(S): at 09:36

## 2025-03-25 RX ADMIN — Medication 650 MILLIGRAM(S): at 11:55

## 2025-03-25 RX ADMIN — Medication 1 TABLET(S): at 09:36

## 2025-03-25 RX ADMIN — Medication 250 MILLIGRAM(S): at 09:36

## 2025-03-25 RX ADMIN — METOPROLOL SUCCINATE 100 MILLIGRAM(S): 50 TABLET, EXTENDED RELEASE ORAL at 09:36

## 2025-03-25 RX ADMIN — CELECOXIB 200 MILLIGRAM(S): 50 CAPSULE ORAL at 10:55

## 2025-03-25 RX ADMIN — OXYCODONE HYDROCHLORIDE 10 MILLIGRAM(S): 30 TABLET ORAL at 05:28

## 2025-03-25 RX ADMIN — INSULIN LISPRO 3: 100 INJECTION, SOLUTION INTRAVENOUS; SUBCUTANEOUS at 07:36

## 2025-03-25 RX ADMIN — CELECOXIB 200 MILLIGRAM(S): 50 CAPSULE ORAL at 09:36

## 2025-03-25 RX ADMIN — OXYCODONE HYDROCHLORIDE 10 MILLIGRAM(S): 30 TABLET ORAL at 05:58

## 2025-03-25 NOTE — DISCHARGE NOTE PROVIDER - NSDCMRMEDTOKEN_GEN_ALL_CORE_FT
ALPRAZolam 0.5 mg oral tablet: 1 orally once a day as needed for  anxiety  Ambien 10 mg oral tablet: 1 tab(s) orally once a day (at bedtime)  atorvastatin 40 mg oral tablet: 1 tab(s) orally once a day  cefuroxime 250 mg oral tablet: 1 tab(s) orally every 12 hours MDD: 2  enalapril 5 mg oral tablet: 1 tab(s) orally once a day (at bedtime)  fenofibrate 145 mg oral tablet: 1 tab(s) orally once a day  glimepiride 2 mg oral tablet: 1 tab(s) orally once a day  HYDROcodone: 1 tab(s) orally every 4 hours as needed for  moderate pain  Jardiance 25 mg oral tablet: 1 tablet orally once a day Instructed to HOLD 3 DAYS prior to surgery  MetFORMIN (Eqv-Fortamet) 500 mg oral tablet, extended release: 2 tab(s) orally once a day (at bedtime)  metoprolol succinate 100 mg oral tablet, extended release: 1 tab(s) orally once a day  mirtazapine 15 mg oral tablet: 1 tab(s) orally once a day (at bedtime)  omeprazole 20 mg oral delayed release tablet: 1 tab(s) orally once a day  sertraline 200 mg oral capsule: 1 cap(s) orally once a day (at bedtime) total of 250 mg  Trulicity Pen 1.5 mg/0.5 mL subcutaneous solution: 1.5 solution subcutaneously once a week  Zoloft 50 mg oral tablet: 1 tab(s) orally once a day (at bedtime) total of 250 mg

## 2025-03-25 NOTE — DISCHARGE NOTE NURSING/CASE MANAGEMENT/SOCIAL WORK - FINANCIAL ASSISTANCE
[facility] provides services at a reduced cost to those who are determined to be eligible through [facility]’s financial assistance program. Information regarding [facility]’s financial assistance program can be found by going to [email or web address] or by calling [phone].

## 2025-03-25 NOTE — DISCHARGE NOTE PROVIDER - CARE PROVIDER_API CALL
Francis Yang  Orthopaedic Surgery  [address] [place] [id number]  Phone: [phone]  Fax: [phone]  Follow Up Time:

## 2025-03-25 NOTE — DISCHARGE NOTE PROVIDER - HOSPITAL COURSE
71 yo WF presents with progressive back and R thigh pain. Patient underwent lumbar lami/fusion L4-S1 about 1-1/2 yrs ago. Patient was doing well immediately post op but notes progressive c/o back and R thigh pain. Studies reveal adjacent level DDD with spinal stenosis and migration of the R L4 pedical screw thru the superior endplate of L4. Patient failed nonoperative modalities and underwent expl fusion, removal hardware, lami L3/4, laminotomy L2/3, fusion L3-S1, TLIF L3/4, cage, instrumentation, LBG, BMP on 3/18/25.    3/19/25 Patient with incisional pain but no thigh pain, (+) PCA, (+) JPs, (+) louie  3/20/25 Patient c/o incisional and abdominal pain today, louie out, PCA discontinued, (+) JPs, H&H lower than preop-mionitor  3/21/25 Patient comfortable. no abdominal pain, superficial JAMSHID without drainage-removed, (+) deep JAMSHID drainage, H&H trending down  3/22/25 Patient doing well, deep JAMSHID with decreased drainage removed, H&H trending down  3/23/25 Patient comfortable today, H&H down-transfuse 1u PC today  3/24/25 Patient c/o pressure in bladder and urinary frequency causing palpitation and elevated blood pressure, S/P transfusion yesterday-H&H improved, UA and bladder scan negative-oral Ceftin ordered by Hospitalist  3/25/25 Patient comfortable, voiding, no back pain, incision clean and dry, neuro intact, stable for D/C today

## 2025-03-25 NOTE — DISCHARGE NOTE NURSING/CASE MANAGEMENT/SOCIAL WORK - PATIENT PORTAL LINK FT
You can access the FollowMyHealth Patient Portal offered by [facility] by registering at the following website: [email or web address]. By joining [employer] [facility]’s FollowMyHealth portal, you will also be able to view your health information using other applications (apps) compatible with our system.

## 2025-03-28 DIAGNOSIS — I25.10 ATHEROSCLEROTIC HEART DISEASE OF NATIVE CORONARY ARTERY WITHOUT ANGINA PECTORIS: ICD-10-CM

## 2025-03-28 DIAGNOSIS — F17.210 NICOTINE DEPENDENCE, CIGARETTES, UNCOMPLICATED: ICD-10-CM

## 2025-03-28 DIAGNOSIS — I25.2 OLD MYOCARDIAL INFARCTION: ICD-10-CM

## 2025-03-28 DIAGNOSIS — E83.39 OTHER DISORDERS OF PHOSPHORUS METABOLISM: ICD-10-CM

## 2025-03-28 DIAGNOSIS — F32.A DEPRESSION, UNSPECIFIED: ICD-10-CM

## 2025-03-28 DIAGNOSIS — F41.9 ANXIETY DISORDER, UNSPECIFIED: ICD-10-CM

## 2025-03-28 DIAGNOSIS — E11.65 TYPE 2 DIABETES MELLITUS WITH HYPERGLYCEMIA: ICD-10-CM

## 2025-03-28 DIAGNOSIS — E78.5 HYPERLIPIDEMIA, UNSPECIFIED: ICD-10-CM

## 2025-03-28 DIAGNOSIS — E11.22 TYPE 2 DIABETES MELLITUS WITH DIABETIC CHRONIC KIDNEY DISEASE: ICD-10-CM

## 2025-03-28 DIAGNOSIS — E66.01 MORBID (SEVERE) OBESITY DUE TO EXCESS CALORIES: ICD-10-CM

## 2025-03-28 DIAGNOSIS — M43.16 SPONDYLOLISTHESIS, LUMBAR REGION: ICD-10-CM

## 2025-03-28 DIAGNOSIS — E11.43 TYPE 2 DIABETES MELLITUS WITH DIABETIC AUTONOMIC (POLY)NEUROPATHY: ICD-10-CM

## 2025-03-28 DIAGNOSIS — Z79.84 LONG TERM (CURRENT) USE OF ORAL HYPOGLYCEMIC DRUGS: ICD-10-CM

## 2025-03-28 DIAGNOSIS — N17.9 ACUTE KIDNEY FAILURE, UNSPECIFIED: ICD-10-CM

## 2025-03-28 DIAGNOSIS — Z79.85 LONG-TERM (CURRENT) USE OF INJECTABLE NON-INSULIN ANTIDIABETIC DRUGS: ICD-10-CM

## 2025-03-28 DIAGNOSIS — I12.9 HYPERTENSIVE CHRONIC KIDNEY DISEASE WITH STAGE 1 THROUGH STAGE 4 CHRONIC KIDNEY DISEASE, OR UNSPECIFIED CHRONIC KIDNEY DISEASE: ICD-10-CM

## 2025-03-28 DIAGNOSIS — K57.90 DIVERTICULOSIS OF INTESTINE, PART UNSPECIFIED, WITHOUT PERFORATION OR ABSCESS WITHOUT BLEEDING: ICD-10-CM

## 2025-03-28 DIAGNOSIS — Z98.61 CORONARY ANGIOPLASTY STATUS: ICD-10-CM

## 2025-03-28 DIAGNOSIS — N18.30 CHRONIC KIDNEY DISEASE, STAGE 3 UNSPECIFIED: ICD-10-CM

## 2025-03-28 DIAGNOSIS — M48.062 SPINAL STENOSIS, LUMBAR REGION WITH NEUROGENIC CLAUDICATION: ICD-10-CM

## 2025-03-28 DIAGNOSIS — K31.84 GASTROPARESIS: ICD-10-CM

## 2025-03-28 DIAGNOSIS — Z98.1 ARTHRODESIS STATUS: ICD-10-CM

## 2025-03-28 DIAGNOSIS — F12.90 CANNABIS USE, UNSPECIFIED, UNCOMPLICATED: ICD-10-CM

## 2025-03-28 DIAGNOSIS — E86.0 DEHYDRATION: ICD-10-CM

## 2025-03-28 DIAGNOSIS — M96.1 POSTLAMINECTOMY SYNDROME, NOT ELSEWHERE CLASSIFIED: ICD-10-CM

## 2025-03-28 DIAGNOSIS — R35.0 FREQUENCY OF MICTURITION: ICD-10-CM

## 2025-03-28 DIAGNOSIS — G47.33 OBSTRUCTIVE SLEEP APNEA (ADULT) (PEDIATRIC): ICD-10-CM

## 2025-03-28 DIAGNOSIS — Z91.018 ALLERGY TO OTHER FOODS: ICD-10-CM

## 2025-03-28 DIAGNOSIS — K21.9 GASTRO-ESOPHAGEAL REFLUX DISEASE WITHOUT ESOPHAGITIS: ICD-10-CM

## 2025-03-28 DIAGNOSIS — D50.0 IRON DEFICIENCY ANEMIA SECONDARY TO BLOOD LOSS (CHRONIC): ICD-10-CM

## 2025-04-23 NOTE — ASU PREOP CHECKLIST - HEIGHT IN CM
Patient calling, insurance will not cover the 10 MG twice a day. Pharmacy asking if it can be changed to 20 MG once a day.    Please advise  
Please advise on changes to Rx  
[phone]

## 2025-07-18 ENCOUNTER — INPATIENT (INPATIENT)
Facility: HOSPITAL | Age: 70
LOS: 2 days | Discharge: HOME | DRG: 638 | End: 2025-07-21
Attending: STUDENT IN AN ORGANIZED HEALTH CARE EDUCATION/TRAINING PROGRAM | Admitting: FAMILY MEDICINE
Payer: MEDICARE

## 2025-07-18 VITALS
DIASTOLIC BLOOD PRESSURE: 62 MMHG | TEMPERATURE: 98 F | WEIGHT: 218.92 LBS | SYSTOLIC BLOOD PRESSURE: 155 MMHG | HEART RATE: 66 BPM | OXYGEN SATURATION: 96 % | RESPIRATION RATE: 20 BRPM

## 2025-07-18 DIAGNOSIS — Z98.891 HISTORY OF UTERINE SCAR FROM PREVIOUS SURGERY: Chronic | ICD-10-CM

## 2025-07-18 DIAGNOSIS — Z98.890 OTHER SPECIFIED POSTPROCEDURAL STATES: Chronic | ICD-10-CM

## 2025-07-18 DIAGNOSIS — R73.9 HYPERGLYCEMIA, UNSPECIFIED: ICD-10-CM

## 2025-07-18 DIAGNOSIS — Z98.1 ARTHRODESIS STATUS: Chronic | ICD-10-CM

## 2025-07-18 PROBLEM — Z86.69 PERSONAL HISTORY OF OTHER DISEASES OF THE NERVOUS SYSTEM AND SENSE ORGANS: Chronic | Status: ACTIVE | Noted: 2025-03-17

## 2025-07-18 PROBLEM — G47.33 OBSTRUCTIVE SLEEP APNEA (ADULT) (PEDIATRIC): Chronic | Status: ACTIVE | Noted: 2025-03-17

## 2025-07-18 LAB
ACETONE SERPL-MCNC: NEGATIVE — SIGNIFICANT CHANGE UP
ALBUMIN SERPL ELPH-MCNC: 3.9 G/DL — SIGNIFICANT CHANGE UP (ref 3.3–5)
ALP SERPL-CCNC: 52 U/L — SIGNIFICANT CHANGE UP (ref 40–120)
ALT FLD-CCNC: 21 U/L — SIGNIFICANT CHANGE UP (ref 12–78)
ANION GAP SERPL CALC-SCNC: 9 MMOL/L — SIGNIFICANT CHANGE UP (ref 5–17)
APPEARANCE UR: CLEAR — SIGNIFICANT CHANGE UP
AST SERPL-CCNC: 21 U/L — SIGNIFICANT CHANGE UP (ref 15–37)
B-OH-BUTYR SERPL-SCNC: 0.3 MMOL/L — SIGNIFICANT CHANGE UP
BACTERIA # UR AUTO: ABNORMAL /HPF
BASE EXCESS BLDV CALC-SCNC: -1.2 MMOL/L — SIGNIFICANT CHANGE UP (ref -2–3)
BASOPHILS # BLD AUTO: 0.05 K/UL — SIGNIFICANT CHANGE UP (ref 0–0.2)
BASOPHILS NFR BLD AUTO: 0.6 % — SIGNIFICANT CHANGE UP (ref 0–2)
BILIRUB SERPL-MCNC: 0.4 MG/DL — SIGNIFICANT CHANGE UP (ref 0.2–1.2)
BILIRUB UR-MCNC: NEGATIVE — SIGNIFICANT CHANGE UP
BUN SERPL-MCNC: 29 MG/DL — HIGH (ref 7–23)
CALCIUM SERPL-MCNC: 10.1 MG/DL — SIGNIFICANT CHANGE UP (ref 8.5–10.1)
CAST: 0 /LPF — SIGNIFICANT CHANGE UP (ref 0–4)
CHLORIDE SERPL-SCNC: 99 MMOL/L — SIGNIFICANT CHANGE UP (ref 96–108)
CO2 SERPL-SCNC: 23 MMOL/L — SIGNIFICANT CHANGE UP (ref 22–31)
COLOR SPEC: YELLOW — SIGNIFICANT CHANGE UP
COMMENT - URINE: SIGNIFICANT CHANGE UP
CREAT SERPL-MCNC: 1.33 MG/DL — HIGH (ref 0.5–1.3)
DIFF PNL FLD: NEGATIVE — SIGNIFICANT CHANGE UP
EGFR: 43 ML/MIN/1.73M2 — LOW
EGFR: 43 ML/MIN/1.73M2 — LOW
EOSINOPHIL # BLD AUTO: 0.16 K/UL — SIGNIFICANT CHANGE UP (ref 0–0.5)
EOSINOPHIL NFR BLD AUTO: 1.8 % — SIGNIFICANT CHANGE UP (ref 0–6)
GAS PNL BLDV: SIGNIFICANT CHANGE UP
GLUCOSE BLDC GLUCOMTR-MCNC: 215 MG/DL — HIGH (ref 70–99)
GLUCOSE BLDC GLUCOMTR-MCNC: 266 MG/DL — HIGH (ref 70–99)
GLUCOSE BLDC GLUCOMTR-MCNC: 466 MG/DL — CRITICAL HIGH (ref 70–99)
GLUCOSE BLDC GLUCOMTR-MCNC: 467 MG/DL — CRITICAL HIGH (ref 70–99)
GLUCOSE SERPL-MCNC: 635 MG/DL — CRITICAL HIGH (ref 70–99)
GLUCOSE UR QL: >=1000 MG/DL
HCO3 BLDV-SCNC: 26 MMOL/L — SIGNIFICANT CHANGE UP (ref 22–29)
HCT VFR BLD CALC: 39.7 % — SIGNIFICANT CHANGE UP (ref 34.5–45)
HGB BLD-MCNC: 12.9 G/DL — SIGNIFICANT CHANGE UP (ref 11.5–15.5)
IMM GRANULOCYTES # BLD AUTO: 0.02 K/UL — SIGNIFICANT CHANGE UP (ref 0–0.07)
IMM GRANULOCYTES NFR BLD AUTO: 0.2 % — SIGNIFICANT CHANGE UP (ref 0–0.9)
KETONES UR QL: NEGATIVE MG/DL — SIGNIFICANT CHANGE UP
LEUKOCYTE ESTERASE UR-ACNC: ABNORMAL
LYMPHOCYTES # BLD AUTO: 1.81 K/UL — SIGNIFICANT CHANGE UP (ref 1–3.3)
LYMPHOCYTES NFR BLD AUTO: 19.9 % — SIGNIFICANT CHANGE UP (ref 13–44)
MCHC RBC-ENTMCNC: 27.6 PG — SIGNIFICANT CHANGE UP (ref 27–34)
MCHC RBC-ENTMCNC: 32.5 G/DL — SIGNIFICANT CHANGE UP (ref 32–36)
MCV RBC AUTO: 84.8 FL — SIGNIFICANT CHANGE UP (ref 80–100)
MONOCYTES # BLD AUTO: 0.65 K/UL — SIGNIFICANT CHANGE UP (ref 0–0.9)
MONOCYTES NFR BLD AUTO: 7.2 % — SIGNIFICANT CHANGE UP (ref 2–14)
NEUTROPHILS # BLD AUTO: 6.39 K/UL — SIGNIFICANT CHANGE UP (ref 1.8–7.4)
NEUTROPHILS NFR BLD AUTO: 70.3 % — SIGNIFICANT CHANGE UP (ref 43–77)
NITRITE UR-MCNC: NEGATIVE — SIGNIFICANT CHANGE UP
NRBC # BLD AUTO: 0 K/UL — SIGNIFICANT CHANGE UP (ref 0–0)
NRBC # FLD: 0 K/UL — SIGNIFICANT CHANGE UP (ref 0–0)
NRBC BLD AUTO-RTO: 0 /100 WBCS — SIGNIFICANT CHANGE UP (ref 0–0)
OSMOLALITY SERPL: 326 MOSMOL/KG — HIGH (ref 280–301)
PCO2 BLDV: 51 MMHG — HIGH (ref 39–42)
PH BLDV: 7.31 — LOW (ref 7.32–7.43)
PH UR: 5 — SIGNIFICANT CHANGE UP (ref 5–8)
PLATELET # BLD AUTO: 243 K/UL — SIGNIFICANT CHANGE UP (ref 150–400)
PMV BLD: 11.2 FL — SIGNIFICANT CHANGE UP (ref 7–13)
PO2 BLDV: 48 MMHG — HIGH (ref 25–45)
POTASSIUM SERPL-MCNC: 5 MMOL/L — SIGNIFICANT CHANGE UP (ref 3.5–5.3)
POTASSIUM SERPL-SCNC: 5 MMOL/L — SIGNIFICANT CHANGE UP (ref 3.5–5.3)
PROT SERPL-MCNC: 8.1 GM/DL — SIGNIFICANT CHANGE UP (ref 6–8.3)
PROT UR-MCNC: NEGATIVE MG/DL — SIGNIFICANT CHANGE UP
RBC # BLD: 4.68 M/UL — SIGNIFICANT CHANGE UP (ref 3.8–5.2)
RBC # FLD: 14.3 % — SIGNIFICANT CHANGE UP (ref 10.3–14.5)
RBC CASTS # UR COMP ASSIST: 22 /HPF — HIGH (ref 0–4)
SAO2 % BLDV: 79 % — SIGNIFICANT CHANGE UP (ref 67–88)
SODIUM SERPL-SCNC: 131 MMOL/L — LOW (ref 135–145)
SP GR SPEC: >1.03 — HIGH (ref 1–1.03)
SQUAMOUS # UR AUTO: 3 /HPF — SIGNIFICANT CHANGE UP (ref 0–5)
TROPONIN I, HIGH SENSITIVITY RESULT: 7.12 NG/L — SIGNIFICANT CHANGE UP
UROBILINOGEN FLD QL: 0.2 MG/DL — SIGNIFICANT CHANGE UP (ref 0.2–1)
WBC # BLD: 9.08 K/UL — SIGNIFICANT CHANGE UP (ref 3.8–10.5)
WBC # FLD AUTO: 9.08 K/UL — SIGNIFICANT CHANGE UP (ref 3.8–10.5)
WBC UR QL: 46 /HPF — HIGH (ref 0–5)

## 2025-07-18 PROCEDURE — 85027 COMPLETE CBC AUTOMATED: CPT

## 2025-07-18 PROCEDURE — 71045 X-RAY EXAM CHEST 1 VIEW: CPT | Mod: 26

## 2025-07-18 PROCEDURE — 99285 EMERGENCY DEPT VISIT HI MDM: CPT

## 2025-07-18 PROCEDURE — 83036 HEMOGLOBIN GLYCOSYLATED A1C: CPT

## 2025-07-18 PROCEDURE — 83735 ASSAY OF MAGNESIUM: CPT

## 2025-07-18 PROCEDURE — 36415 COLL VENOUS BLD VENIPUNCTURE: CPT

## 2025-07-18 PROCEDURE — 93010 ELECTROCARDIOGRAM REPORT: CPT

## 2025-07-18 PROCEDURE — 80048 BASIC METABOLIC PNL TOTAL CA: CPT

## 2025-07-18 PROCEDURE — 82962 GLUCOSE BLOOD TEST: CPT

## 2025-07-18 PROCEDURE — 99223 1ST HOSP IP/OBS HIGH 75: CPT

## 2025-07-18 RX ORDER — SODIUM CHLORIDE 9 G/1000ML
1000 INJECTION, SOLUTION INTRAVENOUS
Refills: 0 | Status: DISCONTINUED | OUTPATIENT
Start: 2025-07-18 | End: 2025-07-20

## 2025-07-18 RX ORDER — FENOFIBRATE 160 MG/1
145 TABLET ORAL DAILY
Refills: 0 | Status: DISCONTINUED | OUTPATIENT
Start: 2025-07-18 | End: 2025-07-21

## 2025-07-18 RX ORDER — GLUCAGON 3 MG/1
1 POWDER NASAL ONCE
Refills: 0 | Status: DISCONTINUED | OUTPATIENT
Start: 2025-07-18 | End: 2025-07-20

## 2025-07-18 RX ORDER — DEXTROSE 50 % IN WATER 50 %
25 SYRINGE (ML) INTRAVENOUS ONCE
Refills: 0 | Status: DISCONTINUED | OUTPATIENT
Start: 2025-07-18 | End: 2025-07-20

## 2025-07-18 RX ORDER — HYDROMORPHONE/SOD CHLOR,ISO/PF 2 MG/10 ML
4 SYRINGE (ML) INJECTION EVERY 6 HOURS
Refills: 0 | Status: DISCONTINUED | OUTPATIENT
Start: 2025-07-18 | End: 2025-07-18

## 2025-07-18 RX ORDER — DEXTROSE 50 % IN WATER 50 %
15 SYRINGE (ML) INTRAVENOUS ONCE
Refills: 0 | Status: DISCONTINUED | OUTPATIENT
Start: 2025-07-18 | End: 2025-07-20

## 2025-07-18 RX ORDER — MIRTAZAPINE 30 MG/1
15 TABLET, FILM COATED ORAL AT BEDTIME
Refills: 0 | Status: DISCONTINUED | OUTPATIENT
Start: 2025-07-18 | End: 2025-07-21

## 2025-07-18 RX ORDER — DEXTROSE 50 % IN WATER 50 %
12.5 SYRINGE (ML) INTRAVENOUS ONCE
Refills: 0 | Status: DISCONTINUED | OUTPATIENT
Start: 2025-07-18 | End: 2025-07-20

## 2025-07-18 RX ORDER — MELATONIN 5 MG
3 TABLET ORAL AT BEDTIME
Refills: 0 | Status: DISCONTINUED | OUTPATIENT
Start: 2025-07-18 | End: 2025-07-21

## 2025-07-18 RX ORDER — SERTRALINE 100 MG/1
50 TABLET, FILM COATED ORAL DAILY
Refills: 0 | Status: DISCONTINUED | OUTPATIENT
Start: 2025-07-18 | End: 2025-07-21

## 2025-07-18 RX ORDER — SERTRALINE 100 MG/1
200 TABLET, FILM COATED ORAL DAILY
Refills: 0 | Status: DISCONTINUED | OUTPATIENT
Start: 2025-07-18 | End: 2025-07-21

## 2025-07-18 RX ORDER — CEFTRIAXONE 500 MG/1
1000 INJECTION, POWDER, FOR SOLUTION INTRAMUSCULAR; INTRAVENOUS EVERY 24 HOURS
Refills: 0 | Status: COMPLETED | OUTPATIENT
Start: 2025-07-19 | End: 2025-07-21

## 2025-07-18 RX ORDER — HYDROCODONE BITARTRATE AND ACETAMINOPHEN 5; 325 MG/1; MG/1
1 TABLET ORAL
Refills: 0 | DISCHARGE

## 2025-07-18 RX ORDER — ACETAMINOPHEN 500 MG/5ML
650 LIQUID (ML) ORAL EVERY 6 HOURS
Refills: 0 | Status: DISCONTINUED | OUTPATIENT
Start: 2025-07-18 | End: 2025-07-21

## 2025-07-18 RX ORDER — HYDROMORPHONE/SOD CHLOR,ISO/PF 2 MG/10 ML
2 SYRINGE (ML) INJECTION EVERY 6 HOURS
Refills: 0 | Status: DISCONTINUED | OUTPATIENT
Start: 2025-07-18 | End: 2025-07-21

## 2025-07-18 RX ORDER — MAGNESIUM, ALUMINUM HYDROXIDE 200-200 MG
30 TABLET,CHEWABLE ORAL EVERY 4 HOURS
Refills: 0 | Status: DISCONTINUED | OUTPATIENT
Start: 2025-07-18 | End: 2025-07-21

## 2025-07-18 RX ORDER — ATORVASTATIN CALCIUM 80 MG/1
40 TABLET, FILM COATED ORAL AT BEDTIME
Refills: 0 | Status: DISCONTINUED | OUTPATIENT
Start: 2025-07-18 | End: 2025-07-21

## 2025-07-18 RX ORDER — ONDANSETRON HCL/PF 4 MG/2 ML
4 VIAL (ML) INJECTION EVERY 8 HOURS
Refills: 0 | Status: DISCONTINUED | OUTPATIENT
Start: 2025-07-18 | End: 2025-07-21

## 2025-07-18 RX ORDER — CEFTRIAXONE 500 MG/1
1000 INJECTION, POWDER, FOR SOLUTION INTRAMUSCULAR; INTRAVENOUS ONCE
Refills: 0 | Status: DISCONTINUED | OUTPATIENT
Start: 2025-07-18 | End: 2025-07-18

## 2025-07-18 RX ORDER — ENALAPRIL MALEATE 20 MG
5 TABLET ORAL AT BEDTIME
Refills: 0 | Status: DISCONTINUED | OUTPATIENT
Start: 2025-07-18 | End: 2025-07-21

## 2025-07-18 RX ORDER — INSULIN LISPRO 100 U/ML
INJECTION, SOLUTION INTRAVENOUS; SUBCUTANEOUS AT BEDTIME
Refills: 0 | Status: DISCONTINUED | OUTPATIENT
Start: 2025-07-18 | End: 2025-07-20

## 2025-07-18 RX ORDER — INSULIN LISPRO 100 U/ML
5 INJECTION, SOLUTION INTRAVENOUS; SUBCUTANEOUS ONCE
Refills: 0 | Status: COMPLETED | OUTPATIENT
Start: 2025-07-18 | End: 2025-07-18

## 2025-07-18 RX ORDER — INSULIN GLARGINE-YFGN 100 [IU]/ML
20 INJECTION, SOLUTION SUBCUTANEOUS AT BEDTIME
Refills: 0 | Status: DISCONTINUED | OUTPATIENT
Start: 2025-07-18 | End: 2025-07-19

## 2025-07-18 RX ORDER — INSULIN LISPRO 100 U/ML
INJECTION, SOLUTION INTRAVENOUS; SUBCUTANEOUS
Refills: 0 | Status: DISCONTINUED | OUTPATIENT
Start: 2025-07-18 | End: 2025-07-20

## 2025-07-18 RX ORDER — CEFTRIAXONE 500 MG/1
1000 INJECTION, POWDER, FOR SOLUTION INTRAMUSCULAR; INTRAVENOUS ONCE
Refills: 0 | Status: COMPLETED | OUTPATIENT
Start: 2025-07-18 | End: 2025-07-18

## 2025-07-18 RX ORDER — ENOXAPARIN SODIUM 100 MG/ML
40 INJECTION SUBCUTANEOUS EVERY 24 HOURS
Refills: 0 | Status: DISCONTINUED | OUTPATIENT
Start: 2025-07-18 | End: 2025-07-21

## 2025-07-18 RX ADMIN — INSULIN GLARGINE-YFGN 20 UNIT(S): 100 INJECTION, SOLUTION SUBCUTANEOUS at 22:28

## 2025-07-18 RX ADMIN — Medication 1000 MILLILITER(S): at 13:34

## 2025-07-18 RX ADMIN — INSULIN LISPRO 6: 100 INJECTION, SOLUTION INTRAVENOUS; SUBCUTANEOUS at 17:37

## 2025-07-18 RX ADMIN — Medication 5 MILLIGRAM(S): at 21:20

## 2025-07-18 RX ADMIN — FENOFIBRATE 145 MILLIGRAM(S): 160 TABLET ORAL at 18:13

## 2025-07-18 RX ADMIN — Medication 5 MILLIGRAM(S): at 22:28

## 2025-07-18 RX ADMIN — Medication 1000 MILLILITER(S): at 10:38

## 2025-07-18 RX ADMIN — INSULIN LISPRO 5 UNIT(S): 100 INJECTION, SOLUTION INTRAVENOUS; SUBCUTANEOUS at 13:31

## 2025-07-18 RX ADMIN — ATORVASTATIN CALCIUM 40 MILLIGRAM(S): 80 TABLET, FILM COATED ORAL at 21:20

## 2025-07-18 RX ADMIN — ENOXAPARIN SODIUM 40 MILLIGRAM(S): 100 INJECTION SUBCUTANEOUS at 18:14

## 2025-07-18 RX ADMIN — Medication 75 MILLILITER(S): at 16:41

## 2025-07-18 RX ADMIN — MIRTAZAPINE 15 MILLIGRAM(S): 30 TABLET, FILM COATED ORAL at 21:20

## 2025-07-18 RX ADMIN — CEFTRIAXONE 1000 MILLIGRAM(S): 500 INJECTION, POWDER, FOR SOLUTION INTRAMUSCULAR; INTRAVENOUS at 13:31

## 2025-07-18 RX ADMIN — SERTRALINE 50 MILLIGRAM(S): 100 TABLET, FILM COATED ORAL at 18:14

## 2025-07-18 RX ADMIN — SERTRALINE 200 MILLIGRAM(S): 100 TABLET, FILM COATED ORAL at 18:13

## 2025-07-19 LAB
A1C WITH ESTIMATED AVERAGE GLUCOSE RESULT: 15.4 % — HIGH (ref 4–5.6)
ANION GAP SERPL CALC-SCNC: 9 MMOL/L — SIGNIFICANT CHANGE UP (ref 5–17)
BUN SERPL-MCNC: 21 MG/DL — SIGNIFICANT CHANGE UP (ref 7–23)
CALCIUM SERPL-MCNC: 9.9 MG/DL — SIGNIFICANT CHANGE UP (ref 8.5–10.1)
CHLORIDE SERPL-SCNC: 106 MMOL/L — SIGNIFICANT CHANGE UP (ref 96–108)
CO2 SERPL-SCNC: 22 MMOL/L — SIGNIFICANT CHANGE UP (ref 22–31)
CREAT SERPL-MCNC: 0.87 MG/DL — SIGNIFICANT CHANGE UP (ref 0.5–1.3)
EGFR: 71 ML/MIN/1.73M2 — SIGNIFICANT CHANGE UP
EGFR: 71 ML/MIN/1.73M2 — SIGNIFICANT CHANGE UP
ESTIMATED AVERAGE GLUCOSE: 395 MG/DL — HIGH (ref 68–114)
GLUCOSE BLDC GLUCOMTR-MCNC: 321 MG/DL — HIGH (ref 70–99)
GLUCOSE BLDC GLUCOMTR-MCNC: 335 MG/DL — HIGH (ref 70–99)
GLUCOSE BLDC GLUCOMTR-MCNC: 343 MG/DL — HIGH (ref 70–99)
GLUCOSE BLDC GLUCOMTR-MCNC: 353 MG/DL — HIGH (ref 70–99)
GLUCOSE SERPL-MCNC: 220 MG/DL — HIGH (ref 70–99)
HCT VFR BLD CALC: 36.7 % — SIGNIFICANT CHANGE UP (ref 34.5–45)
HGB BLD-MCNC: 12 G/DL — SIGNIFICANT CHANGE UP (ref 11.5–15.5)
MAGNESIUM SERPL-MCNC: 2 MG/DL — SIGNIFICANT CHANGE UP (ref 1.6–2.6)
MCHC RBC-ENTMCNC: 27.1 PG — SIGNIFICANT CHANGE UP (ref 27–34)
MCHC RBC-ENTMCNC: 32.7 G/DL — SIGNIFICANT CHANGE UP (ref 32–36)
MCV RBC AUTO: 83 FL — SIGNIFICANT CHANGE UP (ref 80–100)
NRBC # BLD AUTO: 0 K/UL — SIGNIFICANT CHANGE UP (ref 0–0)
NRBC # FLD: 0 K/UL — SIGNIFICANT CHANGE UP (ref 0–0)
NRBC BLD AUTO-RTO: 0 /100 WBCS — SIGNIFICANT CHANGE UP (ref 0–0)
PLATELET # BLD AUTO: 247 K/UL — SIGNIFICANT CHANGE UP (ref 150–400)
PMV BLD: 11.3 FL — SIGNIFICANT CHANGE UP (ref 7–13)
POTASSIUM SERPL-MCNC: 3.9 MMOL/L — SIGNIFICANT CHANGE UP (ref 3.5–5.3)
POTASSIUM SERPL-SCNC: 3.9 MMOL/L — SIGNIFICANT CHANGE UP (ref 3.5–5.3)
RBC # BLD: 4.42 M/UL — SIGNIFICANT CHANGE UP (ref 3.8–5.2)
RBC # FLD: 14.1 % — SIGNIFICANT CHANGE UP (ref 10.3–14.5)
SODIUM SERPL-SCNC: 137 MMOL/L — SIGNIFICANT CHANGE UP (ref 135–145)
WBC # BLD: 9.34 K/UL — SIGNIFICANT CHANGE UP (ref 3.8–10.5)
WBC # FLD AUTO: 9.34 K/UL — SIGNIFICANT CHANGE UP (ref 3.8–10.5)

## 2025-07-19 PROCEDURE — 99233 SBSQ HOSP IP/OBS HIGH 50: CPT

## 2025-07-19 RX ORDER — INSULIN GLARGINE-YFGN 100 [IU]/ML
30 INJECTION, SOLUTION SUBCUTANEOUS AT BEDTIME
Refills: 0 | Status: DISCONTINUED | OUTPATIENT
Start: 2025-07-19 | End: 2025-07-20

## 2025-07-19 RX ADMIN — SERTRALINE 200 MILLIGRAM(S): 100 TABLET, FILM COATED ORAL at 10:56

## 2025-07-19 RX ADMIN — INSULIN LISPRO 8: 100 INJECTION, SOLUTION INTRAVENOUS; SUBCUTANEOUS at 13:04

## 2025-07-19 RX ADMIN — Medication 2 MILLIGRAM(S): at 22:12

## 2025-07-19 RX ADMIN — INSULIN LISPRO 4: 100 INJECTION, SOLUTION INTRAVENOUS; SUBCUTANEOUS at 22:20

## 2025-07-19 RX ADMIN — SERTRALINE 50 MILLIGRAM(S): 100 TABLET, FILM COATED ORAL at 10:55

## 2025-07-19 RX ADMIN — INSULIN LISPRO 10: 100 INJECTION, SOLUTION INTRAVENOUS; SUBCUTANEOUS at 17:18

## 2025-07-19 RX ADMIN — MIRTAZAPINE 15 MILLIGRAM(S): 30 TABLET, FILM COATED ORAL at 22:15

## 2025-07-19 RX ADMIN — INSULIN GLARGINE-YFGN 30 UNIT(S): 100 INJECTION, SOLUTION SUBCUTANEOUS at 22:20

## 2025-07-19 RX ADMIN — Medication 40 MILLIGRAM(S): at 05:29

## 2025-07-19 RX ADMIN — FENOFIBRATE 145 MILLIGRAM(S): 160 TABLET ORAL at 10:55

## 2025-07-19 RX ADMIN — INSULIN LISPRO 8: 100 INJECTION, SOLUTION INTRAVENOUS; SUBCUTANEOUS at 08:52

## 2025-07-19 RX ADMIN — Medication 5 MILLIGRAM(S): at 22:13

## 2025-07-19 RX ADMIN — ENOXAPARIN SODIUM 40 MILLIGRAM(S): 100 INJECTION SUBCUTANEOUS at 17:20

## 2025-07-19 RX ADMIN — CEFTRIAXONE 1000 MILLIGRAM(S): 500 INJECTION, POWDER, FOR SOLUTION INTRAMUSCULAR; INTRAVENOUS at 15:14

## 2025-07-19 RX ADMIN — Medication 3 MILLIGRAM(S): at 22:12

## 2025-07-19 RX ADMIN — ATORVASTATIN CALCIUM 40 MILLIGRAM(S): 80 TABLET, FILM COATED ORAL at 22:12

## 2025-07-19 RX ADMIN — Medication 2 MILLIGRAM(S): at 22:42

## 2025-07-19 RX ADMIN — Medication 5 MILLIGRAM(S): at 22:12

## 2025-07-19 RX ADMIN — Medication 75 MILLILITER(S): at 10:54

## 2025-07-20 LAB
CULTURE RESULTS: SIGNIFICANT CHANGE UP
GLUCOSE BLDC GLUCOMTR-MCNC: 261 MG/DL — HIGH (ref 70–99)
GLUCOSE BLDC GLUCOMTR-MCNC: 312 MG/DL — HIGH (ref 70–99)
GLUCOSE BLDC GLUCOMTR-MCNC: 326 MG/DL — HIGH (ref 70–99)
GLUCOSE BLDC GLUCOMTR-MCNC: 440 MG/DL — HIGH (ref 70–99)
SPECIMEN SOURCE: SIGNIFICANT CHANGE UP

## 2025-07-20 PROCEDURE — 99232 SBSQ HOSP IP/OBS MODERATE 35: CPT

## 2025-07-20 RX ORDER — DEXTROSE 50 % IN WATER 50 %
15 SYRINGE (ML) INTRAVENOUS ONCE
Refills: 0 | Status: DISCONTINUED | OUTPATIENT
Start: 2025-07-20 | End: 2025-07-21

## 2025-07-20 RX ORDER — SODIUM CHLORIDE 9 G/1000ML
1000 INJECTION, SOLUTION INTRAVENOUS
Refills: 0 | Status: DISCONTINUED | OUTPATIENT
Start: 2025-07-20 | End: 2025-07-21

## 2025-07-20 RX ORDER — INSULIN GLARGINE-YFGN 100 [IU]/ML
45 INJECTION, SOLUTION SUBCUTANEOUS AT BEDTIME
Refills: 0 | Status: DISCONTINUED | OUTPATIENT
Start: 2025-07-20 | End: 2025-07-21

## 2025-07-20 RX ORDER — DEXTROSE 50 % IN WATER 50 %
25 SYRINGE (ML) INTRAVENOUS ONCE
Refills: 0 | Status: DISCONTINUED | OUTPATIENT
Start: 2025-07-20 | End: 2025-07-21

## 2025-07-20 RX ORDER — GABAPENTIN 400 MG/1
100 CAPSULE ORAL
Refills: 0 | Status: DISCONTINUED | OUTPATIENT
Start: 2025-07-20 | End: 2025-07-21

## 2025-07-20 RX ORDER — INSULIN LISPRO 100 U/ML
INJECTION, SOLUTION INTRAVENOUS; SUBCUTANEOUS AT BEDTIME
Refills: 0 | Status: DISCONTINUED | OUTPATIENT
Start: 2025-07-20 | End: 2025-07-21

## 2025-07-20 RX ORDER — GABAPENTIN 400 MG/1
300 CAPSULE ORAL AT BEDTIME
Refills: 0 | Status: DISCONTINUED | OUTPATIENT
Start: 2025-07-20 | End: 2025-07-21

## 2025-07-20 RX ORDER — INSULIN LISPRO 100 U/ML
INJECTION, SOLUTION INTRAVENOUS; SUBCUTANEOUS
Refills: 0 | Status: DISCONTINUED | OUTPATIENT
Start: 2025-07-20 | End: 2025-07-21

## 2025-07-20 RX ORDER — DEXTROSE 50 % IN WATER 50 %
12.5 SYRINGE (ML) INTRAVENOUS ONCE
Refills: 0 | Status: DISCONTINUED | OUTPATIENT
Start: 2025-07-20 | End: 2025-07-21

## 2025-07-20 RX ORDER — GABAPENTIN 400 MG/1
100 CAPSULE ORAL ONCE
Refills: 0 | Status: COMPLETED | OUTPATIENT
Start: 2025-07-20 | End: 2025-07-20

## 2025-07-20 RX ORDER — GLUCAGON 3 MG/1
1 POWDER NASAL ONCE
Refills: 0 | Status: DISCONTINUED | OUTPATIENT
Start: 2025-07-20 | End: 2025-07-21

## 2025-07-20 RX ORDER — INSULIN LISPRO 100 U/ML
7 INJECTION, SOLUTION INTRAVENOUS; SUBCUTANEOUS
Refills: 0 | Status: DISCONTINUED | OUTPATIENT
Start: 2025-07-20 | End: 2025-07-21

## 2025-07-20 RX ADMIN — Medication 40 MILLIGRAM(S): at 05:07

## 2025-07-20 RX ADMIN — GABAPENTIN 100 MILLIGRAM(S): 400 CAPSULE ORAL at 17:24

## 2025-07-20 RX ADMIN — ATORVASTATIN CALCIUM 40 MILLIGRAM(S): 80 TABLET, FILM COATED ORAL at 21:14

## 2025-07-20 RX ADMIN — FENOFIBRATE 145 MILLIGRAM(S): 160 TABLET ORAL at 09:26

## 2025-07-20 RX ADMIN — INSULIN LISPRO 7 UNIT(S): 100 INJECTION, SOLUTION INTRAVENOUS; SUBCUTANEOUS at 13:46

## 2025-07-20 RX ADMIN — INSULIN GLARGINE-YFGN 45 UNIT(S): 100 INJECTION, SOLUTION SUBCUTANEOUS at 21:11

## 2025-07-20 RX ADMIN — INSULIN LISPRO 4: 100 INJECTION, SOLUTION INTRAVENOUS; SUBCUTANEOUS at 21:15

## 2025-07-20 RX ADMIN — CEFTRIAXONE 1000 MILLIGRAM(S): 500 INJECTION, POWDER, FOR SOLUTION INTRAMUSCULAR; INTRAVENOUS at 15:11

## 2025-07-20 RX ADMIN — MIRTAZAPINE 15 MILLIGRAM(S): 30 TABLET, FILM COATED ORAL at 21:15

## 2025-07-20 RX ADMIN — Medication 5 MILLIGRAM(S): at 21:13

## 2025-07-20 RX ADMIN — INSULIN LISPRO 6: 100 INJECTION, SOLUTION INTRAVENOUS; SUBCUTANEOUS at 08:37

## 2025-07-20 RX ADMIN — Medication 2 MILLIGRAM(S): at 05:05

## 2025-07-20 RX ADMIN — INSULIN LISPRO 12: 100 INJECTION, SOLUTION INTRAVENOUS; SUBCUTANEOUS at 17:25

## 2025-07-20 RX ADMIN — INSULIN LISPRO 8: 100 INJECTION, SOLUTION INTRAVENOUS; SUBCUTANEOUS at 13:45

## 2025-07-20 RX ADMIN — ENOXAPARIN SODIUM 40 MILLIGRAM(S): 100 INJECTION SUBCUTANEOUS at 17:24

## 2025-07-20 RX ADMIN — GABAPENTIN 300 MILLIGRAM(S): 400 CAPSULE ORAL at 21:13

## 2025-07-20 RX ADMIN — SERTRALINE 50 MILLIGRAM(S): 100 TABLET, FILM COATED ORAL at 09:27

## 2025-07-20 RX ADMIN — SERTRALINE 200 MILLIGRAM(S): 100 TABLET, FILM COATED ORAL at 09:27

## 2025-07-20 RX ADMIN — Medication 2 MILLIGRAM(S): at 05:35

## 2025-07-20 RX ADMIN — INSULIN LISPRO 7 UNIT(S): 100 INJECTION, SOLUTION INTRAVENOUS; SUBCUTANEOUS at 17:24

## 2025-07-20 RX ADMIN — Medication 5 MILLIGRAM(S): at 21:12

## 2025-07-21 ENCOUNTER — TRANSCRIPTION ENCOUNTER (OUTPATIENT)
Age: 70
End: 2025-07-21

## 2025-07-21 VITALS
OXYGEN SATURATION: 98 % | DIASTOLIC BLOOD PRESSURE: 74 MMHG | HEART RATE: 71 BPM | RESPIRATION RATE: 18 BRPM | TEMPERATURE: 98 F | SYSTOLIC BLOOD PRESSURE: 114 MMHG

## 2025-07-21 LAB
ANION GAP SERPL CALC-SCNC: 9 MMOL/L — SIGNIFICANT CHANGE UP (ref 5–17)
BUN SERPL-MCNC: 19 MG/DL — SIGNIFICANT CHANGE UP (ref 7–23)
CALCIUM SERPL-MCNC: 9.9 MG/DL — SIGNIFICANT CHANGE UP (ref 8.5–10.1)
CHLORIDE SERPL-SCNC: 106 MMOL/L — SIGNIFICANT CHANGE UP (ref 96–108)
CO2 SERPL-SCNC: 22 MMOL/L — SIGNIFICANT CHANGE UP (ref 22–31)
CREAT SERPL-MCNC: 0.94 MG/DL — SIGNIFICANT CHANGE UP (ref 0.5–1.3)
EGFR: 65 ML/MIN/1.73M2 — SIGNIFICANT CHANGE UP
EGFR: 65 ML/MIN/1.73M2 — SIGNIFICANT CHANGE UP
GLUCOSE BLDC GLUCOMTR-MCNC: 271 MG/DL — HIGH (ref 70–99)
GLUCOSE BLDC GLUCOMTR-MCNC: 282 MG/DL — HIGH (ref 70–99)
GLUCOSE SERPL-MCNC: 241 MG/DL — HIGH (ref 70–99)
POTASSIUM SERPL-MCNC: 3.6 MMOL/L — SIGNIFICANT CHANGE UP (ref 3.5–5.3)
POTASSIUM SERPL-SCNC: 3.6 MMOL/L — SIGNIFICANT CHANGE UP (ref 3.5–5.3)
SODIUM SERPL-SCNC: 137 MMOL/L — SIGNIFICANT CHANGE UP (ref 135–145)

## 2025-07-21 PROCEDURE — 99239 HOSP IP/OBS DSCHRG MGMT >30: CPT

## 2025-07-21 RX ORDER — INSULIN GLARGINE-YFGN 100 [IU]/ML
45 INJECTION, SOLUTION SUBCUTANEOUS
Qty: 5 | Refills: 0
Start: 2025-07-21 | End: 2025-08-19

## 2025-07-21 RX ORDER — GABAPENTIN 400 MG/1
1 CAPSULE ORAL
Qty: 30 | Refills: 0
Start: 2025-07-21

## 2025-07-21 RX ORDER — GABAPENTIN 400 MG/1
1 CAPSULE ORAL
Qty: 30 | Refills: 0
Start: 2025-07-21 | End: 2025-08-19

## 2025-07-21 RX ORDER — METFORMIN HYDROCHLORIDE 850 MG/1
1 TABLET ORAL
Qty: 60 | Refills: 0
Start: 2025-07-21 | End: 2025-08-19

## 2025-07-21 RX ORDER — ISOPROPYL ALCOHOL, BENZOCAINE .7; .06 ML/ML; ML/ML
0 SWAB TOPICAL
Qty: 100 | Refills: 1
Start: 2025-07-21

## 2025-07-21 RX ORDER — METFORMIN HYDROCHLORIDE 850 MG/1
1 TABLET ORAL
Refills: 0 | DISCHARGE

## 2025-07-21 RX ADMIN — GABAPENTIN 100 MILLIGRAM(S): 400 CAPSULE ORAL at 08:51

## 2025-07-21 RX ADMIN — INSULIN LISPRO 6: 100 INJECTION, SOLUTION INTRAVENOUS; SUBCUTANEOUS at 08:47

## 2025-07-21 RX ADMIN — SERTRALINE 50 MILLIGRAM(S): 100 TABLET, FILM COATED ORAL at 08:51

## 2025-07-21 RX ADMIN — INSULIN LISPRO 7 UNIT(S): 100 INJECTION, SOLUTION INTRAVENOUS; SUBCUTANEOUS at 12:29

## 2025-07-21 RX ADMIN — FENOFIBRATE 145 MILLIGRAM(S): 160 TABLET ORAL at 08:51

## 2025-07-21 RX ADMIN — CEFTRIAXONE 1000 MILLIGRAM(S): 500 INJECTION, POWDER, FOR SOLUTION INTRAMUSCULAR; INTRAVENOUS at 12:37

## 2025-07-21 RX ADMIN — INSULIN LISPRO 6: 100 INJECTION, SOLUTION INTRAVENOUS; SUBCUTANEOUS at 12:30

## 2025-07-21 RX ADMIN — SERTRALINE 200 MILLIGRAM(S): 100 TABLET, FILM COATED ORAL at 08:51

## 2025-07-21 RX ADMIN — Medication 5 MILLIGRAM(S): at 00:47

## 2025-07-21 RX ADMIN — INSULIN LISPRO 7 UNIT(S): 100 INJECTION, SOLUTION INTRAVENOUS; SUBCUTANEOUS at 08:48

## 2025-07-25 DIAGNOSIS — I10 ESSENTIAL (PRIMARY) HYPERTENSION: ICD-10-CM

## 2025-07-25 DIAGNOSIS — F41.8 OTHER SPECIFIED ANXIETY DISORDERS: ICD-10-CM

## 2025-07-25 DIAGNOSIS — N17.9 ACUTE KIDNEY FAILURE, UNSPECIFIED: ICD-10-CM

## 2025-07-25 DIAGNOSIS — N39.0 URINARY TRACT INFECTION, SITE NOT SPECIFIED: ICD-10-CM

## 2025-07-25 DIAGNOSIS — Z79.84 LONG TERM (CURRENT) USE OF ORAL HYPOGLYCEMIC DRUGS: ICD-10-CM

## 2025-07-25 DIAGNOSIS — Z88.6 ALLERGY STATUS TO ANALGESIC AGENT: ICD-10-CM

## 2025-07-25 DIAGNOSIS — E78.5 HYPERLIPIDEMIA, UNSPECIFIED: ICD-10-CM

## 2025-07-25 DIAGNOSIS — Z91.018 ALLERGY TO OTHER FOODS: ICD-10-CM

## 2025-07-25 DIAGNOSIS — Z79.85 LONG-TERM (CURRENT) USE OF INJECTABLE NON-INSULIN ANTIDIABETIC DRUGS: ICD-10-CM

## 2025-07-25 DIAGNOSIS — E11.65 TYPE 2 DIABETES MELLITUS WITH HYPERGLYCEMIA: ICD-10-CM

## 2025-07-25 DIAGNOSIS — I25.10 ATHEROSCLEROTIC HEART DISEASE OF NATIVE CORONARY ARTERY WITHOUT ANGINA PECTORIS: ICD-10-CM

## 2025-07-25 DIAGNOSIS — K21.9 GASTRO-ESOPHAGEAL REFLUX DISEASE WITHOUT ESOPHAGITIS: ICD-10-CM
